# Patient Record
Sex: MALE | Race: WHITE | NOT HISPANIC OR LATINO | Employment: FULL TIME | URBAN - NONMETROPOLITAN AREA
[De-identification: names, ages, dates, MRNs, and addresses within clinical notes are randomized per-mention and may not be internally consistent; named-entity substitution may affect disease eponyms.]

---

## 2017-10-27 ENCOUNTER — APPOINTMENT (OUTPATIENT)
Dept: OCCUPATIONAL MEDICINE | Facility: OTHER | Age: 32
End: 2017-10-27

## 2017-10-27 ENCOUNTER — APPOINTMENT (OUTPATIENT)
Dept: CHIROPRACTIC MEDICINE | Facility: OTHER | Age: 32
End: 2017-10-27

## 2017-10-27 PROCEDURE — 99499 UNLISTED E&M SERVICE: CPT

## 2019-04-08 NOTE — PROGRESS NOTES
"  SUBJECTIVE:                                                    Rhett Moore is a 33 year old male who presents to clinic today for the following health issues:    New Patient/Transfer of Care    Hypothyroidism Follow-up     Since last visit, patient describes the following symptoms: Weight stable, no hair loss, no skin changes, no constipation, no loose stools; no mood concerns    Was previously on Synthroid but stopped medication due to losing health insurance. Has been off since age 23.    Elevated blood pressure - no prior diagnosis of hypertension.  Asymptomatic.  On chest pain, headaches, vision changes.    Morbid obesity - is at max.  Struggle since college.    Is fasting today.    Eyelashes bend down/in.  Has been that way for few years.  Affects vision.  Some crusting.  Safety glasses at work.  Like looking through a screen all the time.    Problem list and histories reviewed & adjusted, as indicated.  Additional history: as documented    No current outpatient medications on file.     No current facility-administered medications for this visit.        Patient Active Problem List   Diagnosis     Morbid obesity (H)     Hypothyroidism, unspecified type     Past Surgical History:   Procedure Laterality Date     APPENDECTOMY       TUBES         Social History     Tobacco Use     Smoking status: Never Smoker     Smokeless tobacco: Never Used   Substance Use Topics     Alcohol use: Yes     Family History   Problem Relation Age of Onset     Cancer Mother      Cerebrovascular Disease Father            ROS:  Constitutional, HEENT, cardiovascular, pulmonary, gi and gu systems are negative, except as otherwise noted.    OBJECTIVE:     /90 (BP Location: Right arm, Patient Position: Chair, Cuff Size: Adult Large)   Pulse 82   Temp 96.7  F (35.9  C) (Tympanic)   Ht 1.772 m (5' 9.75\")   Wt (!) 167.4 kg (369 lb)   SpO2 97%   BMI 53.33 kg/m    Body mass index is 53.33 kg/m .  GENERAL: alert, no distress and " obese  EYES: PERRL, EOMI and eyelids do protrude down from top to bottom, through full visual fields; lids otherwise normal  NECK: no adenopathy, no asymmetry, masses, or scars and thyroid normal to palpation  RESP: lungs clear to auscultation - no rales, rhonchi or wheezes  CV: regular rate and rhythm, normal S1 S2, no S3 or S4, no murmur, click or rub, no peripheral edema and peripheral pulses strong  ABDOMEN: soft, nontender, no hepatosplenomegaly, no masses and bowel sounds normal  MS: no gross musculoskeletal defects noted, no edema  NEURO: symmetric reflexes  SKIN: no suspicious lesions or rashes  PSYCH: mentation appears normal, affect normal/bright    Diagnostic Test Results:  pending    ASSESSMENT/PLAN:     (E03.9) Hypothyroidism, unspecified type  (primary encounter diagnosis)  Comment: off medication for 10 years; stressed importance of compliance for his health  Plan: TSH with free T4 reflex        Will restart medication based on results    (E66.01) Morbid obesity (H)  Comment: may be partially secondary to his untreated hypothyroidism; labs as ordered; referral to nutritionist; consider Metformin; consider bariatric referral - medical vs surgical  Plan: TSH with free T4 reflex, Lipid Profile (Chol,         Trig, HDL, LDL calc), Comprehensive metabolic         panel, NUTRITION REFERRAL            (Z00.00) Encounter for medical examination to establish care    (R03.0) Elevated blood pressure reading without diagnosis of hypertension  Comment: will recheck at follow up  Plan: Comprehensive metabolic panel        If continues to be elevated - will treat; lifestyle modifications and address thyroid first    (H02.9) Eyelid abnormality  Comment: structural abnormality; affects line of vision  Plan: OPHTHALMOLOGY ADULT REFERRAL        [facility]    (Z13.220) Lipid screening  Comment: fasting today  Plan: Lipid Profile (Chol, Trig, HDL, LDL calc)            (Z13.1) Screening for diabetes  mellitus  Comment: fasting today  Plan: Comprehensive metabolic panel              Noelle Shin MD  [facility]

## 2019-04-11 ENCOUNTER — OFFICE VISIT (OUTPATIENT)
Dept: FAMILY MEDICINE | Facility: OTHER | Age: 34
End: 2019-04-11
Attending: FAMILY MEDICINE
Payer: COMMERCIAL

## 2019-04-11 VITALS
BODY MASS INDEX: 45.1 KG/M2 | TEMPERATURE: 96.7 F | OXYGEN SATURATION: 97 % | DIASTOLIC BLOOD PRESSURE: 90 MMHG | SYSTOLIC BLOOD PRESSURE: 132 MMHG | HEIGHT: 70 IN | HEART RATE: 82 BPM | WEIGHT: 315 LBS

## 2019-04-11 DIAGNOSIS — E66.01 MORBID OBESITY (H): ICD-10-CM

## 2019-04-11 DIAGNOSIS — Z00.00 ENCOUNTER FOR MEDICAL EXAMINATION TO ESTABLISH CARE: ICD-10-CM

## 2019-04-11 DIAGNOSIS — R03.0 ELEVATED BLOOD PRESSURE READING WITHOUT DIAGNOSIS OF HYPERTENSION: ICD-10-CM

## 2019-04-11 DIAGNOSIS — Z13.220 LIPID SCREENING: ICD-10-CM

## 2019-04-11 DIAGNOSIS — H02.9 EYELID ABNORMALITY: ICD-10-CM

## 2019-04-11 DIAGNOSIS — Z13.1 SCREENING FOR DIABETES MELLITUS: ICD-10-CM

## 2019-04-11 DIAGNOSIS — E03.9 HYPOTHYROIDISM, UNSPECIFIED TYPE: Primary | ICD-10-CM

## 2019-04-11 LAB
ALBUMIN SERPL-MCNC: 4.2 G/DL (ref 3.4–5)
ALP SERPL-CCNC: 56 U/L (ref 40–150)
ALT SERPL W P-5'-P-CCNC: 92 U/L (ref 0–70)
ANION GAP SERPL CALCULATED.3IONS-SCNC: 6 MMOL/L (ref 3–14)
AST SERPL W P-5'-P-CCNC: 29 U/L (ref 0–45)
BILIRUB SERPL-MCNC: 0.7 MG/DL (ref 0.2–1.3)
BUN SERPL-MCNC: 18 MG/DL (ref 7–30)
CALCIUM SERPL-MCNC: 8.7 MG/DL (ref 8.5–10.1)
CHLORIDE SERPL-SCNC: 107 MMOL/L (ref 94–109)
CHOLEST SERPL-MCNC: 223 MG/DL
CO2 SERPL-SCNC: 27 MMOL/L (ref 20–32)
CREAT SERPL-MCNC: 0.99 MG/DL (ref 0.66–1.25)
GFR SERPL CREATININE-BSD FRML MDRD: >90 ML/MIN/{1.73_M2}
GLUCOSE SERPL-MCNC: 88 MG/DL (ref 70–99)
HDLC SERPL-MCNC: 39 MG/DL
LDLC SERPL CALC-MCNC: 149 MG/DL
NONHDLC SERPL-MCNC: 184 MG/DL
POTASSIUM SERPL-SCNC: 4.1 MMOL/L (ref 3.4–5.3)
PROT SERPL-MCNC: 8 G/DL (ref 6.8–8.8)
SODIUM SERPL-SCNC: 140 MMOL/L (ref 133–144)
T4 FREE SERPL-MCNC: 0.63 NG/DL (ref 0.76–1.46)
TRIGL SERPL-MCNC: 176 MG/DL
TSH SERPL DL<=0.005 MIU/L-ACNC: 38.77 MU/L (ref 0.4–4)

## 2019-04-11 PROCEDURE — 99203 OFFICE O/P NEW LOW 30 MIN: CPT | Performed by: FAMILY MEDICINE

## 2019-04-11 PROCEDURE — 80053 COMPREHEN METABOLIC PANEL: CPT | Performed by: FAMILY MEDICINE

## 2019-04-11 PROCEDURE — 80061 LIPID PANEL: CPT | Performed by: FAMILY MEDICINE

## 2019-04-11 PROCEDURE — 84443 ASSAY THYROID STIM HORMONE: CPT | Performed by: FAMILY MEDICINE

## 2019-04-11 PROCEDURE — 84439 ASSAY OF FREE THYROXINE: CPT | Performed by: FAMILY MEDICINE

## 2019-04-11 PROCEDURE — 36415 COLL VENOUS BLD VENIPUNCTURE: CPT | Performed by: FAMILY MEDICINE

## 2019-04-11 RX ORDER — LEVOTHYROXINE SODIUM 50 UG/1
50 TABLET ORAL DAILY
Qty: 30 TABLET | Refills: 1 | Status: SHIPPED | OUTPATIENT
Start: 2019-04-11 | End: 2019-05-14

## 2019-04-11 ASSESSMENT — PATIENT HEALTH QUESTIONNAIRE - PHQ9: SUM OF ALL RESPONSES TO PHQ QUESTIONS 1-9: 0

## 2019-04-11 ASSESSMENT — ANXIETY QUESTIONNAIRES
GAD7 TOTAL SCORE: 0
5. BEING SO RESTLESS THAT IT IS HARD TO SIT STILL: NOT AT ALL
3. WORRYING TOO MUCH ABOUT DIFFERENT THINGS: NOT AT ALL
7. FEELING AFRAID AS IF SOMETHING AWFUL MIGHT HAPPEN: NOT AT ALL
1. FEELING NERVOUS, ANXIOUS, OR ON EDGE: NOT AT ALL
2. NOT BEING ABLE TO STOP OR CONTROL WORRYING: NOT AT ALL
4. TROUBLE RELAXING: NOT AT ALL
6. BECOMING EASILY ANNOYED OR IRRITABLE: NOT AT ALL

## 2019-04-11 ASSESSMENT — PAIN SCALES - GENERAL: PAINLEVEL: NO PAIN (0)

## 2019-04-11 ASSESSMENT — MIFFLIN-ST. JEOR: SCORE: 2631.05

## 2019-04-11 NOTE — NURSING NOTE
"Chief Complaint   Patient presents with     Establish Care       Initial /90 (BP Location: Right arm, Patient Position: Chair, Cuff Size: Adult Large)   Pulse 82   Temp 96.7  F (35.9  C) (Tympanic)   Ht 1.772 m (5' 9.75\")   Wt (!) 167.4 kg (369 lb)   SpO2 97%   BMI 53.33 kg/m   Estimated body mass index is 53.33 kg/m  as calculated from the following:    Height as of this encounter: 1.772 m (5' 9.75\").    Weight as of this encounter: 167.4 kg (369 lb).  Medication Reconciliation: complete    Zoila Larson LPN    "

## 2019-04-11 NOTE — PATIENT INSTRUCTIONS
Will call with lab results.  Will then restart thyroid medication accordingly.  Referral to nutritionist.  Referral to [facility].

## 2019-04-12 ASSESSMENT — ANXIETY QUESTIONNAIRES: GAD7 TOTAL SCORE: 0

## 2019-04-16 ENCOUNTER — HOSPITAL ENCOUNTER (OUTPATIENT)
Dept: NUTRITION | Facility: HOSPITAL | Age: 34
Discharge: HOME | End: 2019-04-16
Attending: FAMILY MEDICINE | Admitting: FAMILY MEDICINE
Payer: COMMERCIAL

## 2019-04-16 PROCEDURE — 97802 MEDICAL NUTRITION INDIV IN: CPT | Performed by: DIETITIAN, REGISTERED

## 2019-04-17 VITALS — HEIGHT: 70 IN | BODY MASS INDEX: 45.1 KG/M2 | WEIGHT: 315 LBS

## 2019-04-17 ASSESSMENT — MIFFLIN-ST. JEOR: SCORE: 2654.19

## 2019-04-17 NOTE — PROGRESS NOTES
"[facility]  Medical Nutrition Therapy    Visit Type: Initial Assessment    Rhett Moore referred for MNT related to Obesity      Nutrition Assessment:  Anthropometrics  Height: .  177.2 cm (5' 9.75\") BMI:    54.06   Weight:  (!) 169.7 kg (374 lb 1.6 oz)(with work boots on) BSA:  2.89   IBW (kg): Male: 73.4         Nutrition History  Pt his here for weight management. Pt has not tried to lose weight in the past. He use to live a more active lifestyle before starting current job. Typically eats 2-3 times a day. No snacks. Some portions are large. Little to no fruits and vegetables incorporated into his daily food choices. Eats out 2-3x per week. Sedentary job. Is motivated to start making changes.     Food Record  Breakfast  - weekdays- none usually  - weekend- 3 eggs with 3-4 goode or sausage, 2 toast with tsp of butter each    Lunch  - weekday- sandwich- ham and cheese with a granola bar and grapes    Dinner  - pizza (from [employer])- 4 pieces   - tacos- 2 large soft shell with meat, cheese, lettuce  - porketta  - 2 sandwiches with potatoes  - spaghetti- 2-3 cups with texas toast- 2 pieces    Snack  - none    Beverages  - water  - skim milk  - Captain and [employer] (10-15 drinks every other weekend)    Physical Activity   Not much. Sedentary job- driving. Use to be more active before starting this job. Use to ride bike with his cousin. 4-wheeling and hunting.    Nutrition Prescription  Energy:   6221-0490 kcal (MSJ -500 kcal)    Protein:   55-70g (0.8-1.0g/kg IBW)           Nutrition Diagnosis:   Obesity related to excess energy intake as evidenced by BMI 54    Nutrition Intervention:   - MyPlate  - Portion size  - Weight loss recommendations- limit calories from beverages (including alcohol)  - Physical activity- getting heart rate up  - Food Journal  - SMART Goals    Nutrition Goals:   1. Reduce portion size at dinner during the week (5 days)  2. Increase physical activity 1 hour 3 days a " week    Nutrition Follow Up / Monitoring:   diet recall, weight, physical activity    Nutrition Recommendations:   2000 kcal diet with a goal of increasing daily acitivty    Time spent with pt: 60 min    Patient to follow-up with RD in 4-8 weeks.  Patient has RD contact information to call/email if needed.

## 2019-04-22 ENCOUNTER — TRANSFERRED RECORDS (OUTPATIENT)
Dept: HEALTH INFORMATION MANAGEMENT | Facility: CLINIC | Age: 34
End: 2019-04-22

## 2019-05-14 NOTE — PROGRESS NOTES
SUBJECTIVE:   Rhett Moore is a 33 year old male who presents to clinic today for the following   health issues:    Hypothyroidism Follow-up      Since last visit, patient describes the following symptoms: Weight stable, no hair loss, no skin changes, no constipation, no loose stools    Amount of exercise or physical activity: 2-3 days/week for an average of 15-30 minutes    Problems taking medications regularly: No    Medication side effects: none    Diet: regular (no restrictions)    (Elevated BP Follow up) - has never been on medication.  Asymptomatic.    Hyperlipidemia Follow-Up      Rate your low fat/cholesterol diet?: fair    Taking statin?  No    Other lipid medications/supplements?:  None    Started to walk regularly since last visit    Started lifting weights as well    Cutting back on portions    Has met with dietician    Cholesterol   Date Value Ref Range Status   04/11/2019 223 (H) <200 mg/dL Final     Comment:     Desirable:       <200 mg/dl     HDL Cholesterol   Date Value Ref Range Status   04/11/2019 39 (L) >39 mg/dL Final     LDL Cholesterol Calculated   Date Value Ref Range Status   04/11/2019 149 (H) <100 mg/dL Final     Comment:     Above desirable:  100-129 mg/dl  Borderline High:  130-159 mg/dL  High:             160-189 mg/dL  Very high:       >189 mg/dl       Triglycerides   Date Value Ref Range Status   04/11/2019 176 (H) <150 mg/dL Final     Comment:     Borderline high:  150-199 mg/dl  High:             200-499 mg/dl  Very high:       >499 mg/dl         No results found for: CHOLHDLRATIO          Additional history: as documented    Reviewed  and updated as needed this visit by clinical staff         Reviewed and updated as needed this visit by Provider         Current Outpatient Medications   Medication     levothyroxine (SYNTHROID/LEVOTHROID) 50 MCG tablet     lisinopril (PRINIVIL/ZESTRIL) 10 MG tablet     No current facility-administered medications for this visit.        Patient  Active Problem List   Diagnosis     Morbid obesity (H)     Hypothyroidism, unspecified type     Essential hypertension     Past Surgical History:   Procedure Laterality Date     APPENDECTOMY       TUBES         Social History     Tobacco Use     Smoking status: Never Smoker     Smokeless tobacco: Never Used   Substance Use Topics     Alcohol use: Yes     Family History   Problem Relation Age of Onset     Cancer Mother      Cerebrovascular Disease Father            ROS:  CONSTITUTIONAL: NEGATIVE for fever, chills, change in weight  EYES: NEGATIVE for vision changes or irritation  ENT/MOUTH: NEGATIVE for ear, mouth and throat problems  RESP: NEGATIVE for significant cough or SOB  CV: NEGATIVE for chest pain, palpitations or peripheral edema  MUSCULOSKELETAL: NEGATIVE for significant arthralgias or myalgia    OBJECTIVE:                                                    /82 (BP Location: Right arm, Patient Position: Chair, Cuff Size: Adult Large)   Pulse 86   Temp 97.9  F (36.6  C) (Tympanic)   Wt (!) 165.7 kg (365 lb 6.4 oz)   SpO2 97%   BMI 52.81 kg/m    Body mass index is 52.81 kg/m .   GENERAL: obese, alert, no distress  NECK: no tenderness, no adenopathy, no asymmetry, no masses, no stiffness; thyroid- normal to palpation  RESP: lungs clear to auscultation - no rales, no rhonchi, no wheezes  CV: regular rates and rhythm, normal S1 S2, no S3 or S4 and no murmur, no click or rub -  PSYCH: Alert and oriented times 3; speech- coherent , normal rate and volume    TSH pending.     ASSESSMENT/PLAN:                                                    1. Essential hypertension  New diagnosis.  Start Lisinopril 10 mg daily.  Counseled on use, potential side effects.  Recheck in office 1 vinay - BP and lab.  - lisinopril (PRINIVIL/ZESTRIL) 10 MG tablet; Take 1 tablet (10 mg) by mouth daily  Dispense: 30 tablet; Refill: 3    2. Hypothyroidism, unspecified type  Recent start of Synthroid. Due for recheck level.  Then  will adjust Sythroid accordingly.    (E66.01) Morbid obesity (H)  Comment: dietary, exercise, and lifestyle counseling done again  See Patient Instructions    Noelle Shin MD  [facility]

## 2019-05-20 ENCOUNTER — OFFICE VISIT (OUTPATIENT)
Dept: FAMILY MEDICINE | Facility: OTHER | Age: 34
End: 2019-05-20
Attending: FAMILY MEDICINE
Payer: COMMERCIAL

## 2019-05-20 VITALS
BODY MASS INDEX: 52.81 KG/M2 | WEIGHT: 315 LBS | HEART RATE: 86 BPM | SYSTOLIC BLOOD PRESSURE: 140 MMHG | DIASTOLIC BLOOD PRESSURE: 82 MMHG | OXYGEN SATURATION: 97 % | TEMPERATURE: 97.9 F

## 2019-05-20 DIAGNOSIS — I10 ESSENTIAL HYPERTENSION: Primary | ICD-10-CM

## 2019-05-20 DIAGNOSIS — E03.9 HYPOTHYROIDISM, UNSPECIFIED TYPE: ICD-10-CM

## 2019-05-20 DIAGNOSIS — E66.01 MORBID OBESITY (H): ICD-10-CM

## 2019-05-20 LAB
T4 FREE SERPL-MCNC: 0.68 NG/DL (ref 0.76–1.46)
TSH SERPL DL<=0.005 MIU/L-ACNC: 16.93 MU/L (ref 0.4–4)

## 2019-05-20 PROCEDURE — 36415 COLL VENOUS BLD VENIPUNCTURE: CPT | Performed by: FAMILY MEDICINE

## 2019-05-20 PROCEDURE — 84439 ASSAY OF FREE THYROXINE: CPT | Performed by: FAMILY MEDICINE

## 2019-05-20 PROCEDURE — 84443 ASSAY THYROID STIM HORMONE: CPT | Performed by: FAMILY MEDICINE

## 2019-05-20 PROCEDURE — 99214 OFFICE O/P EST MOD 30 MIN: CPT | Performed by: FAMILY MEDICINE

## 2019-05-20 RX ORDER — LEVOTHYROXINE SODIUM 75 UG/1
75 TABLET ORAL DAILY
Qty: 30 TABLET | Refills: 1 | Status: SHIPPED | OUTPATIENT
Start: 2019-05-20 | End: 2019-06-20

## 2019-05-20 RX ORDER — LISINOPRIL 10 MG/1
10 TABLET ORAL DAILY
Qty: 30 TABLET | Refills: 3 | Status: SHIPPED | OUTPATIENT
Start: 2019-05-20 | End: 2019-08-19

## 2019-05-20 ASSESSMENT — PAIN SCALES - GENERAL: PAINLEVEL: NO PAIN (0)

## 2019-05-20 NOTE — PATIENT INSTRUCTIONS
Recheck thyroid lab today - adjust synthroid accordingly.    Start Lisinopril 10 mg daily for BP.    Follow up 1 month to recheck BP/Lab.

## 2019-05-20 NOTE — NURSING NOTE
"Chief Complaint   Patient presents with     Hypertension     Thyroid Problem     Lipids       Initial /86 (BP Location: Right arm, Patient Position: Chair, Cuff Size: Adult Large)   Pulse 86   Temp 97.9  F (36.6  C) (Tympanic)   Wt (!) 165.7 kg (365 lb 6.4 oz)   SpO2 97%   BMI 52.81 kg/m   Estimated body mass index is 52.81 kg/m  as calculated from the following:    Height as of 4/17/19: 1.772 m (5' 9.75\").    Weight as of this encounter: 165.7 kg (365 lb 6.4 oz).  Medication Reconciliation: complete    Zoila Larson LPN    "

## 2019-06-18 NOTE — PROGRESS NOTES
Subjective     Rhett Moore is a 33 year old male who presents to clinic today for the following health issues:    HPI   Hypertension Follow-up      Do you check your blood pressure regularly outside of the clinic? No     Are you following a low salt diet? No    Are your blood pressures ever more than 140 on the top number (systolic) OR more   than 90 on the bottom number (diastolic), for example 140/90? Unsure  Started on Lisinopril 10 mg daily 1 month ago.  Patient states no issues / side effects with medication.   Is taking regularly.     Hypothyroidism Follow-up      Since last visit, patient describes the following symptoms: Weight stable, no hair loss, no skin changes, no constipation, no loose stools    Amount of exercise or physical activity: 2 days per week    Problems taking medications regularly: No    Medication side effects: none    Diet: regular (no restrictions)    Synthroid dose increased 1 month ago    Patient states no issues / side effects with medication.     Is taking regularly.           Current Outpatient Medications   Medication     levothyroxine (SYNTHROID/LEVOTHROID) 75 MCG tablet     lisinopril (PRINIVIL/ZESTRIL) 10 MG tablet     No current facility-administered medications for this visit.        Patient Active Problem List   Diagnosis     Morbid obesity (H)     Hypothyroidism, unspecified type     Essential hypertension     Past Surgical History:   Procedure Laterality Date     APPENDECTOMY       TUBES         Social History     Tobacco Use     Smoking status: Never Smoker     Smokeless tobacco: Never Used   Substance Use Topics     Alcohol use: Yes     Family History   Problem Relation Age of Onset     Cancer Mother      Cerebrovascular Disease Father              Reviewed and updated as needed this visit by Provider  Allergies  Meds         Review of Systems   ROS COMP: Constitutional, HEENT, cardiovascular, pulmonary, gi and gu systems are negative, except as otherwise noted.     "  Objective    /82 (BP Location: Right arm, Patient Position: Chair, Cuff Size: Adult Large)   Pulse 78   Temp 98.2  F (36.8  C) (Tympanic)   Wt (!) 166.5 kg (367 lb)   SpO2 97%   BMI 53.04 kg/m    Body mass index is 53.04 kg/m .  Physical Exam   GENERAL: alert, no distress and obese  EYES: Eyes grossly normal to inspection, PERRL and conjunctivae and sclerae normal  NECK: no adenopathy, no asymmetry, masses, or scars and thyroid normal to palpation  RESP: lungs clear to auscultation - no rales, rhonchi or wheezes  CV: regular rate and rhythm, normal S1 S2, no S3 or S4, no murmur, click or rub, no peripheral edema and peripheral pulses strong  MS: no gross musculoskeletal defects noted, no edema  SKIN: no suspicious lesions or rashes  PSYCH: mentation appears normal, affect normal/bright    Diagnostic Test Results:  Pending tsh and bmp        Assessment & Plan       ICD-10-CM    1. Essential hypertension I10 Basic metabolic panel   2. Hypothyroidism, unspecified type E03.9 TSH with free T4 reflex     levothyroxine (SYNTHROID/LEVOTHROID) 75 MCG tablet   3. Morbid obesity (H) E66.01         BMI:   Estimated body mass index is 53.04 kg/m  as calculated from the following:    Height as of 4/17/19: 1.772 m (5' 9.75\").    Weight as of this encounter: 166.5 kg (367 lb).   Weight management plan: Discussed healthy diet and exercise guidelines    Overall doing well.  No side effects with medications.  Encouraged lifestyle changes again as well.  Labs today and will adjust accordingly.    Patient Instructions   Continue current dose.  Will call with lab results.      Return in about 3 months (around 9/20/2019) for BP Recheck.    Noelle Shin MD  [facility]      "

## 2019-06-20 ENCOUNTER — OFFICE VISIT (OUTPATIENT)
Dept: FAMILY MEDICINE | Facility: OTHER | Age: 34
End: 2019-06-20
Attending: FAMILY MEDICINE
Payer: COMMERCIAL

## 2019-06-20 VITALS
WEIGHT: 315 LBS | OXYGEN SATURATION: 97 % | SYSTOLIC BLOOD PRESSURE: 126 MMHG | HEART RATE: 78 BPM | DIASTOLIC BLOOD PRESSURE: 82 MMHG | BODY MASS INDEX: 53.04 KG/M2 | TEMPERATURE: 98.2 F

## 2019-06-20 DIAGNOSIS — E03.9 HYPOTHYROIDISM, UNSPECIFIED TYPE: ICD-10-CM

## 2019-06-20 DIAGNOSIS — E66.01 MORBID OBESITY (H): ICD-10-CM

## 2019-06-20 DIAGNOSIS — I10 ESSENTIAL HYPERTENSION: Primary | ICD-10-CM

## 2019-06-20 LAB
ANION GAP SERPL CALCULATED.3IONS-SCNC: 6 MMOL/L (ref 3–14)
BUN SERPL-MCNC: 13 MG/DL (ref 7–30)
CALCIUM SERPL-MCNC: 9.1 MG/DL (ref 8.5–10.1)
CHLORIDE SERPL-SCNC: 107 MMOL/L (ref 94–109)
CO2 SERPL-SCNC: 27 MMOL/L (ref 20–32)
CREAT SERPL-MCNC: 1.03 MG/DL (ref 0.66–1.25)
GFR SERPL CREATININE-BSD FRML MDRD: >90 ML/MIN/{1.73_M2}
GLUCOSE SERPL-MCNC: 86 MG/DL (ref 70–99)
POTASSIUM SERPL-SCNC: 4.5 MMOL/L (ref 3.4–5.3)
SODIUM SERPL-SCNC: 140 MMOL/L (ref 133–144)
T4 FREE SERPL-MCNC: 0.79 NG/DL (ref 0.76–1.46)
TSH SERPL DL<=0.005 MIU/L-ACNC: 18.45 MU/L (ref 0.4–4)

## 2019-06-20 PROCEDURE — 80048 BASIC METABOLIC PNL TOTAL CA: CPT | Performed by: FAMILY MEDICINE

## 2019-06-20 PROCEDURE — 99213 OFFICE O/P EST LOW 20 MIN: CPT | Performed by: FAMILY MEDICINE

## 2019-06-20 PROCEDURE — 84443 ASSAY THYROID STIM HORMONE: CPT | Performed by: FAMILY MEDICINE

## 2019-06-20 PROCEDURE — 36415 COLL VENOUS BLD VENIPUNCTURE: CPT | Performed by: FAMILY MEDICINE

## 2019-06-20 PROCEDURE — 84439 ASSAY OF FREE THYROXINE: CPT | Performed by: FAMILY MEDICINE

## 2019-06-20 RX ORDER — LEVOTHYROXINE SODIUM 75 UG/1
75 TABLET ORAL DAILY
Qty: 90 TABLET | Refills: 1 | Status: SHIPPED | OUTPATIENT
Start: 2019-06-20 | End: 2019-06-20

## 2019-06-20 RX ORDER — LEVOTHYROXINE SODIUM 88 UG/1
88 TABLET ORAL DAILY
Qty: 30 TABLET | Refills: 1 | Status: SHIPPED | OUTPATIENT
Start: 2019-06-20 | End: 2019-08-19

## 2019-06-20 ASSESSMENT — PAIN SCALES - GENERAL: PAINLEVEL: NO PAIN (0)

## 2019-06-20 NOTE — NURSING NOTE
"Chief Complaint   Patient presents with     Hypertension     Thyroid Problem       Initial /82 (BP Location: Right arm, Patient Position: Chair, Cuff Size: Adult Large)   Pulse 78   Temp 98.2  F (36.8  C) (Tympanic)   Wt (!) 166.5 kg (367 lb)   SpO2 97%   BMI 53.04 kg/m   Estimated body mass index is 53.04 kg/m  as calculated from the following:    Height as of 4/17/19: 1.772 m (5' 9.75\").    Weight as of this encounter: 166.5 kg (367 lb).  Medication Reconciliation: complete   Zoila Larson LPN          "

## 2019-08-19 DIAGNOSIS — E03.9 HYPOTHYROIDISM, UNSPECIFIED TYPE: ICD-10-CM

## 2019-08-19 DIAGNOSIS — I10 ESSENTIAL HYPERTENSION: ICD-10-CM

## 2019-08-19 NOTE — TELEPHONE ENCOUNTER
Patient states he received a letter from his insurance requesting he change pharmacies to [facility] in [place], [place] along with changing his fills to 90 tabs per refill

## 2019-08-22 RX ORDER — LISINOPRIL 10 MG/1
10 TABLET ORAL DAILY
Qty: 90 TABLET | Refills: 2 | Status: SHIPPED | OUTPATIENT
Start: 2019-08-22 | End: 2020-08-18

## 2019-08-22 RX ORDER — LEVOTHYROXINE SODIUM 88 UG/1
88 TABLET ORAL DAILY
Qty: 90 TABLET | Refills: 0 | Status: SHIPPED | OUTPATIENT
Start: 2019-08-22 | End: 2019-09-09

## 2019-09-09 DIAGNOSIS — E03.9 HYPOTHYROIDISM, UNSPECIFIED TYPE: ICD-10-CM

## 2019-09-09 LAB
T4 FREE SERPL-MCNC: 0.7 NG/DL (ref 0.76–1.46)
TSH SERPL DL<=0.005 MIU/L-ACNC: 23.78 MU/L (ref 0.4–4)

## 2019-09-09 PROCEDURE — 84439 ASSAY OF FREE THYROXINE: CPT | Performed by: FAMILY MEDICINE

## 2019-09-09 PROCEDURE — 36415 COLL VENOUS BLD VENIPUNCTURE: CPT | Performed by: FAMILY MEDICINE

## 2019-09-09 PROCEDURE — 84443 ASSAY THYROID STIM HORMONE: CPT | Performed by: FAMILY MEDICINE

## 2019-09-09 RX ORDER — LEVOTHYROXINE SODIUM 100 UG/1
100 TABLET ORAL DAILY
Qty: 30 TABLET | Refills: 1 | Status: SHIPPED | OUTPATIENT
Start: 2019-09-09 | End: 2019-10-31

## 2019-11-12 DIAGNOSIS — E03.9 HYPOTHYROIDISM, UNSPECIFIED TYPE: ICD-10-CM

## 2019-11-12 LAB
T4 FREE SERPL-MCNC: 0.83 NG/DL (ref 0.76–1.46)
TSH SERPL DL<=0.005 MIU/L-ACNC: 14.73 MU/L (ref 0.4–4)

## 2019-11-12 PROCEDURE — 36415 COLL VENOUS BLD VENIPUNCTURE: CPT | Performed by: FAMILY MEDICINE

## 2019-11-12 PROCEDURE — 84439 ASSAY OF FREE THYROXINE: CPT | Performed by: FAMILY MEDICINE

## 2019-11-12 PROCEDURE — 84443 ASSAY THYROID STIM HORMONE: CPT | Performed by: FAMILY MEDICINE

## 2019-11-13 RX ORDER — LEVOTHYROXINE SODIUM 112 UG/1
112 TABLET ORAL DAILY
Qty: 30 TABLET | Refills: 1 | Status: SHIPPED | OUTPATIENT
Start: 2019-11-13 | End: 2019-12-07

## 2019-11-16 DIAGNOSIS — E03.9 HYPOTHYROIDISM, UNSPECIFIED TYPE: ICD-10-CM

## 2019-11-18 RX ORDER — LEVOTHYROXINE SODIUM 88 UG/1
TABLET ORAL
Qty: 90 TABLET | Refills: 0 | OUTPATIENT
Start: 2019-11-18

## 2019-12-07 DIAGNOSIS — E03.9 HYPOTHYROIDISM, UNSPECIFIED TYPE: ICD-10-CM

## 2019-12-10 RX ORDER — LEVOTHYROXINE SODIUM 112 UG/1
TABLET ORAL
Qty: 30 TABLET | Refills: 0 | Status: SHIPPED | OUTPATIENT
Start: 2019-12-10 | End: 2019-12-13

## 2019-12-13 NOTE — TELEPHONE ENCOUNTER
Fax received from [facility] pharmacy, the pt's insurance will only cover a 90 day supply of medication. Please change the Rx to a 90 day supply.

## 2019-12-16 RX ORDER — LEVOTHYROXINE SODIUM 112 UG/1
112 TABLET ORAL DAILY
Qty: 90 TABLET | Refills: 0 | Status: SHIPPED | OUTPATIENT
Start: 2019-12-16 | End: 2020-03-06

## 2020-02-08 ENCOUNTER — HOSPITAL ENCOUNTER (EMERGENCY)
Facility: HOSPITAL | Age: 35
Discharge: HOME | End: 2020-02-08
Attending: NURSE PRACTITIONER | Admitting: NURSE PRACTITIONER
Payer: COMMERCIAL

## 2020-02-08 ENCOUNTER — APPOINTMENT (OUTPATIENT)
Dept: GENERAL RADIOLOGY | Facility: HOSPITAL | Age: 35
End: 2020-02-08
Attending: NURSE PRACTITIONER
Payer: COMMERCIAL

## 2020-02-08 VITALS
DIASTOLIC BLOOD PRESSURE: 100 MMHG | SYSTOLIC BLOOD PRESSURE: 158 MMHG | RESPIRATION RATE: 24 BRPM | TEMPERATURE: 99.5 F | HEART RATE: 115 BPM | OXYGEN SATURATION: 95 %

## 2020-02-08 DIAGNOSIS — J18.9 PNEUMONIA: ICD-10-CM

## 2020-02-08 DIAGNOSIS — J18.9 PNEUMONIA DUE TO INFECTIOUS ORGANISM, UNSPECIFIED LATERALITY, UNSPECIFIED PART OF LUNG: ICD-10-CM

## 2020-02-08 LAB
ANION GAP SERPL CALCULATED.3IONS-SCNC: 8 MMOL/L (ref 3–14)
BASOPHILS # BLD AUTO: 0.1 10E9/L (ref 0–0.2)
BASOPHILS NFR BLD AUTO: 0.7 %
BUN SERPL-MCNC: 13 MG/DL (ref 7–30)
CALCIUM SERPL-MCNC: 9.1 MG/DL (ref 8.5–10.1)
CHLORIDE SERPL-SCNC: 102 MMOL/L (ref 94–109)
CO2 SERPL-SCNC: 24 MMOL/L (ref 20–32)
CREAT SERPL-MCNC: 1.06 MG/DL (ref 0.66–1.25)
CRP SERPL-MCNC: 222 MG/L (ref 0–8)
DEPRECATED S PYO AG THROAT QL EIA: NORMAL
DIFFERENTIAL METHOD BLD: ABNORMAL
EOSINOPHIL # BLD AUTO: 0.2 10E9/L (ref 0–0.7)
EOSINOPHIL NFR BLD AUTO: 1.1 %
ERYTHROCYTE [DISTWIDTH] IN BLOOD BY AUTOMATED COUNT: 13 % (ref 10–15)
GFR SERPL CREATININE-BSD FRML MDRD: >90 ML/MIN/{1.73_M2}
GLUCOSE SERPL-MCNC: 99 MG/DL (ref 70–99)
HCT VFR BLD AUTO: 42.1 % (ref 40–53)
HGB BLD-MCNC: 14 G/DL (ref 13.3–17.7)
IMM GRANULOCYTES # BLD: 0.2 10E9/L (ref 0–0.4)
IMM GRANULOCYTES NFR BLD: 1.4 %
LACTATE BLD-SCNC: 1.1 MMOL/L (ref 0.7–2)
LYMPHOCYTES # BLD AUTO: 1.9 10E9/L (ref 0.8–5.3)
LYMPHOCYTES NFR BLD AUTO: 13.5 %
MCH RBC QN AUTO: 28.7 PG (ref 26.5–33)
MCHC RBC AUTO-ENTMCNC: 33.3 G/DL (ref 31.5–36.5)
MCV RBC AUTO: 86 FL (ref 78–100)
MONOCYTES # BLD AUTO: 1.4 10E9/L (ref 0–1.3)
MONOCYTES NFR BLD AUTO: 10.4 %
NEUTROPHILS # BLD AUTO: 10 10E9/L (ref 1.6–8.3)
NEUTROPHILS NFR BLD AUTO: 72.9 %
NRBC # BLD AUTO: 0 10*3/UL
NRBC BLD AUTO-RTO: 0 /100
PLATELET # BLD AUTO: 451 10E9/L (ref 150–450)
POTASSIUM SERPL-SCNC: 3.7 MMOL/L (ref 3.4–5.3)
RBC # BLD AUTO: 4.88 10E12/L (ref 4.4–5.9)
SODIUM SERPL-SCNC: 134 MMOL/L (ref 133–144)
SPECIMEN SOURCE: NORMAL
WBC # BLD AUTO: 13.7 10E9/L (ref 4–11)

## 2020-02-08 PROCEDURE — 36415 COLL VENOUS BLD VENIPUNCTURE: CPT | Performed by: NURSE PRACTITIONER

## 2020-02-08 PROCEDURE — 71046 X-RAY EXAM CHEST 2 VIEWS: CPT | Mod: TC

## 2020-02-08 PROCEDURE — 86140 C-REACTIVE PROTEIN: CPT | Performed by: NURSE PRACTITIONER

## 2020-02-08 PROCEDURE — 87880 STREP A ASSAY W/OPTIC: CPT | Performed by: NURSE PRACTITIONER

## 2020-02-08 PROCEDURE — 96372 THER/PROPH/DIAG INJ SC/IM: CPT

## 2020-02-08 PROCEDURE — G0463 HOSPITAL OUTPT CLINIC VISIT: HCPCS | Mod: 25

## 2020-02-08 PROCEDURE — 99213 OFFICE O/P EST LOW 20 MIN: CPT | Mod: Z6 | Performed by: NURSE PRACTITIONER

## 2020-02-08 PROCEDURE — 25000128 H RX IP 250 OP 636: Performed by: NURSE PRACTITIONER

## 2020-02-08 PROCEDURE — 80048 BASIC METABOLIC PNL TOTAL CA: CPT | Performed by: NURSE PRACTITIONER

## 2020-02-08 PROCEDURE — 83605 ASSAY OF LACTIC ACID: CPT | Performed by: NURSE PRACTITIONER

## 2020-02-08 PROCEDURE — 87081 CULTURE SCREEN ONLY: CPT | Performed by: NURSE PRACTITIONER

## 2020-02-08 PROCEDURE — 85025 COMPLETE CBC W/AUTO DIFF WBC: CPT | Performed by: NURSE PRACTITIONER

## 2020-02-08 RX ORDER — CEFTRIAXONE SODIUM 1 G
1 VIAL (EA) INJECTION ONCE
Status: COMPLETED | OUTPATIENT
Start: 2020-02-08 | End: 2020-02-08

## 2020-02-08 RX ORDER — AZITHROMYCIN 500 MG/1
500 TABLET, FILM COATED ORAL DAILY
Qty: 5 TABLET | Refills: 0 | Status: SHIPPED | OUTPATIENT
Start: 2020-02-08 | End: 2020-02-12

## 2020-02-08 RX ADMIN — CEFTRIAXONE 1 G: 1 INJECTION, POWDER, FOR SOLUTION INTRAMUSCULAR; INTRAVENOUS at 17:03

## 2020-02-08 ASSESSMENT — ENCOUNTER SYMPTOMS
SINUS PRESSURE: 0
COUGH: 1
MYALGIAS: 1
DIARRHEA: 1
RHINORRHEA: 1
NAUSEA: 0
SHORTNESS OF BREATH: 0
EYES NEGATIVE: 1
SINUS PAIN: 0
CHILLS: 1
ACTIVITY CHANGE: 1
CHEST TIGHTNESS: 0
FEVER: 1
LIGHT-HEADEDNESS: 1
HEADACHES: 1
VOMITING: 0
SORE THROAT: 1
DIZZINESS: 1

## 2020-02-08 ASSESSMENT — CURB65 SCORE
HAS CONFUSION: NO
RESPIRATORY RATE GREATER THAN OR EQUAL TO 30: NO
AGE IS GREATER THAN OR EQUAL TO 65: NO
BUN IS GREATER THAN 19 MG/DL: NO
SBP LESS THAN 90 OR DBNP LESS THAN 60: NO
CURB65 SCORE: 0

## 2020-02-08 NOTE — ED PROVIDER NOTES
History     Chief Complaint   Patient presents with     Influenza     over the past week. cough and fever      HPI  Rhett Moore is a 34 year old male who is accompanied per his mother. He presents with a three week history of chills, fevers, cough, runny nose, sore throat (resolved), diarrhea, body aches, dizziness, headaches, and light headedness. He has a history of otitis media, as an adult, and hypertension. He has taken Mucinex and Dayquil for his symptoms with his last dose at 0600 this morning. His is a non-smoker. He did not receive and influenza vaccine this season. Denies  nausea, vomiting,  and shortness of breath.    Allergies:  No Known Allergies    Problem List:    Patient Active Problem List    Diagnosis Date Noted     Essential hypertension 05/20/2019     Priority: Medium     Morbid obesity (H) 04/11/2019     Priority: Medium     Hypothyroidism, unspecified type 04/11/2019     Priority: Medium        Past Medical History:    Past Medical History:   Diagnosis Date     Attention deficit disorder of childhood without me 02/16/2000     Chronic conjunctivitis of both eyes, unspecified chronic conjunctivitis type      Hypertrichosis of left upper eyelid      Hypertrichosis of right upper eyelid      Unspecified acquired hypothyroidism 10/19/2006       Past Surgical History:    Past Surgical History:   Procedure Laterality Date     APPENDECTOMY       TUBES         Family History:    Family History   Problem Relation Age of Onset     Cancer Mother      Cerebrovascular Disease Father        Social History:  Marital Status:  Single [1]  Social History     Tobacco Use     Smoking status: Never Smoker     Smokeless tobacco: Never Used   Substance Use Topics     Alcohol use: Yes     Drug use: Never        Medications:    azithromycin (ZITHROMAX) 500 MG tablet  levothyroxine (SYNTHROID/LEVOTHROID) 112 MCG tablet  lisinopril (PRINIVIL/ZESTRIL) 10 MG tablet          Review of Systems   Constitutional: Positive  for activity change, appetite change (poor appetite is drinking fluids), chills and fever.   HENT: Positive for rhinorrhea and sore throat (resolved at this time). Negative for ear pain, sinus pressure and sinus pain.    Eyes: Negative.    Respiratory: Positive for cough. Negative for chest tightness and shortness of breath.    Gastrointestinal: Positive for diarrhea. Negative for nausea and vomiting.   Genitourinary: Negative.    Musculoskeletal: Positive for myalgias.   Skin: Negative.    Neurological: Positive for dizziness, light-headedness and headaches.       Physical Exam   BP: 156/93  Pulse: 115  Temp: 99.5  F (37.5  C)  Resp: 24  SpO2: 95 %      Physical Exam  Vitals signs and nursing note reviewed.   Constitutional:       General: He is in acute distress.      Appearance: He is obese. He is ill-appearing.      Comments: He is sweating profusely   HENT:      Head: Normocephalic.      Right Ear: Tympanic membrane and ear canal normal.      Left Ear: Tympanic membrane and ear canal normal.      Nose: Nose normal.      Right Sinus: No maxillary sinus tenderness or frontal sinus tenderness.      Left Sinus: No maxillary sinus tenderness or frontal sinus tenderness.      Mouth/Throat:      Lips: Pink.      Mouth: Mucous membranes are moist.      Pharynx: Oropharynx is clear. Uvula midline.   Eyes:      Conjunctiva/sclera: Conjunctivae normal.   Cardiovascular:      Rate and Rhythm: Regular rhythm. Tachycardia present.      Heart sounds: Normal heart sounds. No murmur.   Pulmonary:      Effort: Pulmonary effort is normal.      Breath sounds: Normal breath sounds. No wheezing or rhonchi.   Lymphadenopathy:      Cervical: No cervical adenopathy.   Skin:     General: Skin is warm and dry.   Neurological:      Mental Status: He is alert and oriented to person, place, and time.   Psychiatric:         Behavior: Behavior normal.         ED Course        Procedures             Results for orders placed or performed  during the hospital encounter of 02/08/20 (from the past 24 hour(s))   Rapid strep screen   Result Value Ref Range    Specimen Description Throat     Rapid Strep A Screen       NEGATIVE: No Group A streptococcal antigen detected by immunoassay, await culture report.   CBC with platelets differential   Result Value Ref Range    WBC 13.7 (H) 4.0 - 11.0 10e9/L    RBC Count 4.88 4.4 - 5.9 10e12/L    Hemoglobin 14.0 13.3 - 17.7 g/dL    Hematocrit 42.1 40.0 - 53.0 %    MCV 86 78 - 100 fl    MCH 28.7 26.5 - 33.0 pg    MCHC 33.3 31.5 - 36.5 g/dL    RDW 13.0 10.0 - 15.0 %    Platelet Count 451 (H) 150 - 450 10e9/L    Diff Method Automated Method     % Neutrophils 72.9 %    % Lymphocytes 13.5 %    % Monocytes 10.4 %    % Eosinophils 1.1 %    % Basophils 0.7 %    % Immature Granulocytes 1.4 %    Nucleated RBCs 0 0 /100    Absolute Neutrophil 10.0 (H) 1.6 - 8.3 10e9/L    Absolute Lymphocytes 1.9 0.8 - 5.3 10e9/L    Absolute Monocytes 1.4 (H) 0.0 - 1.3 10e9/L    Absolute Eosinophils 0.2 0.0 - 0.7 10e9/L    Absolute Basophils 0.1 0.0 - 0.2 10e9/L    Abs Immature Granulocytes 0.2 0 - 0.4 10e9/L    Absolute Nucleated RBC 0.0    CRP inflammation   Result Value Ref Range    CRP Inflammation 222.0 (H) 0.0 - 8.0 mg/L   Basic metabolic panel   Result Value Ref Range    Sodium 134 133 - 144 mmol/L    Potassium 3.7 3.4 - 5.3 mmol/L    Chloride 102 94 - 109 mmol/L    Carbon Dioxide 24 20 - 32 mmol/L    Anion Gap 8 3 - 14 mmol/L    Glucose 99 70 - 99 mg/dL    Urea Nitrogen 13 7 - 30 mg/dL    Creatinine 1.06 0.66 - 1.25 mg/dL    GFR Estimate >90 >60 mL/min/[1.73_m2]    GFR Estimate If Black >90 >60 mL/min/[1.73_m2]    Calcium 9.1 8.5 - 10.1 mg/dL   Lactic acid whole blood   Result Value Ref Range    Lactic Acid 1.1 0.7 - 2.0 mmol/L   Chest XR,  PA & LAT    Narrative    PROCEDURE:  XR CHEST 2 VW    HISTORY:  sick for three weeks with URI symptoms. right lower lung  field rhonchi and decreased. diaphorectic..     COMPARISON:   None.    FINDINGS:   The cardiac silhouette is normal in size. The pulmonary vasculature is  normal.  There is a right lower lobe infiltrate suspicious for  pneumonia No pleural effusion or pneumothorax.      Impression    IMPRESSION:  Right lower lobe infiltrate suspicious for pneumonia      GEO MCKEON MD       Medications   cefTRIAXone (ROCEPHIN) injection 1 g (1 g Intramuscular Given 2/8/20 1703)       Assessments & Plan (with Medical Decision Making)     I have reviewed the nursing notes.    I have reviewed the findings, diagnosis, plan and need for follow up with the patient.  (J18.9) Pneumonia    Comment: 34 year old male who is accompanied per his mother. He presents with a three week history of chills, fevers, cough, runny nose, sore throat (resolved), diarrhea, body aches, dizziness, headaches, and light headedness. He has a history of otitis media, as an adult, and hypertension. He has taken Mucinex and Dayquil for his symptoms with his last dose at 0600 this morning. His is a non-smoker. He did not receive and influenza vaccine this season. Denies  nausea, vomiting,  and shortness of breath.    Assessment negative except that he was sweating profusely. His lungs were clear an he denies shortness of breath. His symptoms were ongoing for three weeks. His blood pressure was elevated, but he had not been taking his blood pressure medications while he was ill. He had been utilizing Dayquil and Nyquil frequently while he was ill.     CXR reviewed and per radiology: Right lower lobe infiltrate suspicious for pneumonia    Rapid strep negative, culture pending. BMP negative. His CRP was 222. WBC 13.7, absolute neutrophil 10 and monocytes 1.4. lactic acid 1.1 His SAMANTHA-2 score was 0.    Plan: he was given an IM dose of ceftriaxone and started on Azithromycin 500 mg once daily for five days.Education provided on these medication and pneumonia. He was instructed to take his BP medications during his time of  illness and avoid medications with ephedrine and pseudoephedrine as these will increase your BP.  He was advised to look at the main ingredients when purchasing OTC medications.      Treat symptoms conservatively with acetaminophen and  ibuprofen (if applicable) for fevers, body aches, and headaches, guaifenesin and/or honey for cough. May use chest rubs for sore throat and congestion, hot and cold liquids may help decrease sore throat and help you feel better. Increase fluids.  Return to be reevaluated by ER/UC or your primary care provider if symptoms worsen, you develop breathing difficulties, or you do not improve in a reasonable time frame. It can take several days for a cough to resolve. It can take ten to fourteen days for upper respiratory symptoms to resolve.   These discharge instructions and medications were reviewed with him and his mother and understanding verbalized.    Discharge Medication List as of 2/8/2020  5:53 PM      START taking these medications    Details   azithromycin (ZITHROMAX) 500 MG tablet Take 1 tablet (500 mg) by mouth daily for 5 days, Disp-5 tablet, R-0, E-Prescribe             Final diagnoses:   Pneumonia       2/8/2020   [facility]       Deisi Velasco, CNP  02/12/20 3040

## 2020-02-08 NOTE — DISCHARGE INSTRUCTIONS
Increase oral intake, cool mist vaporizer as needed, rest, avoid sharing utensils, practice good hand washing techniques, cover mouth when you cough and sneeze. Throw toothbursh away 24 hours after starting antibiotics.  Over the counter medications such as ibuprofen and/or acetaminophen for fever and generalized aches and pains. Ibuprofen 400 to 800 mg (2 - 4 tabs of over the counter med) every six to eight hours as needed;not to exceed maximum amount of 3200 mg in 24 hours.Tylenol 650 to 1000 mg every four to six hours as needed (not to exceed more than 4000 mg in a 24 hour period). May use interchangeably. Robitussin (guaifenesin) for cough. Chest rubs such as George's or Mentholatum may help reduce sore throat symptoms.  Chloraseptic spray for sore throat or menthol lozenges may be helpful for sore throat. Be reevaluated if symptoms persist longer than 10 - 14 days or worsen and if there is no improvement in 72 hours or worsening of symptoms.  Increase fluids. Complete all antibiotics even if feeling better. Taking antibiotics with food may decrease the stomach upset that can occur when taking antibiotics. Antibiotics frequently cause diarrhea. Probiotics or yogurt may help prevent or decrease these symptoms.    Look at over the counter medications to observe the product you are purchasing. Ephedrine and pseudoephedrine are not the best choice to take when you have high blood pressure.   TAKE YOUR BP MEDICATION EVEN WHEN YOU ARE NOT FEELING WELL.      Fluids, herbs, and foods for sore throat relief -- Adjusting the temperature and texture of foods and beverages may provide local relief of sore throat pain. While data showing benefit are quite limited, these approaches are intuitive. We typically advise these measures since they are likely to be safe with minimal adverse effect, and patients often describe relief of symptoms.  We suggest hydration with frozen (eg, ice or popsicles) or heated liquids (eg, teas,  soups), rather than room temperature or refrigerated fluids in patient with significant sore throat pain. Very cold foods can have a numbing-like effect that temporarily reduces or alleviates the pain of swallowing. Ice cubes or frozen popsicles facilitate hydration; ice cream and frozen yogurt provide caloric intake.  Warm fluids and foods, including teas, soups, and soft non-irritating foods, may be better tolerated by patients with throat pain than irritating foods (eg, rough-textured or spicy foods) or fluids at room temperatures. Foods that coat the throat, including honey and hard candies, can facilitate intake of calories while temporarily relieving throat pain.

## 2020-02-08 NOTE — ED TRIAGE NOTES
Pt presents with being sick for 3 weeks, cough, sweats, headaches, muscles/ bones ache, dizziness, poor appetite. States he is drinking water. Took Mucinex at 0600 today.

## 2020-02-08 NOTE — ED AVS SNAPSHOT
[facility]  [address] [address]  Phone:  [phone]                                    Rhett Moore   MRN: [id number]    Department:  [facility]   Date of Visit:  2/8/2020           After Visit Summary Signature Page    I have received my discharge instructions, and my questions have been answered. I have discussed any challenges I see with this plan with the nurse or doctor.    ..........................................................................................................................................  Patient/Patient Representative Signature      ..........................................................................................................................................  Patient Representative Print Name and Relationship to Patient    ..................................................               ................................................  Date                                   Time    ..........................................................................................................................................  Reviewed by Signature/Title    ...................................................              ..............................................  Date                                               Time          22EPIC Rev 08/18

## 2020-02-10 ENCOUNTER — TELEPHONE (OUTPATIENT)
Dept: FAMILY MEDICINE | Facility: OTHER | Age: 35
End: 2020-02-10

## 2020-02-10 LAB
BACTERIA SPEC CULT: NORMAL
SPECIMEN SOURCE: NORMAL

## 2020-02-10 NOTE — TELEPHONE ENCOUNTER
Patient calling and needs ED follow up for pneumonia. Was seen in ED on 2.8.2020. Feels little better and is on Z-pack. He not going to work tonight or probably not tomorrow night. He is asking for a work excuse.    Call back at  [phone]..  Thank you,    Cristiane oDve RN

## 2020-02-10 NOTE — TELEPHONE ENCOUNTER
Patient was seen in UC/ED 02/08/20 and was diagnosed with pneumonia. Mom states patient is looking much better but he has been coughing so much that it is causing chest pain. Mom wondering if there is any cough medicine that patient could take to help with cough.  Please advise. Pharmacy pended.    Verbal permission was given by son to speak with mom.    Mom phone:   [phone]    Patient phone:  [phone]

## 2020-02-10 NOTE — LETTER
[facility] [place]  [address]  [facility] [address]  Phone: [phone]  Re:  February 11, 2020        Rhett Moore  [address] [facility] [address]          To whom it may concern:    RE: Rhett Moore    Patient was seen and treated in ER 2/8/2020.  Please excuse through 2/11/2020.    Please contact me for questions or concerns.      Sincerely,        Cristiane Dove RN

## 2020-02-12 ENCOUNTER — OFFICE VISIT (OUTPATIENT)
Dept: FAMILY MEDICINE | Facility: OTHER | Age: 35
End: 2020-02-12
Attending: FAMILY MEDICINE
Payer: COMMERCIAL

## 2020-02-12 ENCOUNTER — NURSE TRIAGE (OUTPATIENT)
Dept: FAMILY MEDICINE | Facility: OTHER | Age: 35
End: 2020-02-12

## 2020-02-12 VITALS
OXYGEN SATURATION: 94 % | TEMPERATURE: 100.8 F | HEART RATE: 115 BPM | DIASTOLIC BLOOD PRESSURE: 64 MMHG | HEIGHT: 70 IN | WEIGHT: 315 LBS | SYSTOLIC BLOOD PRESSURE: 112 MMHG | BODY MASS INDEX: 45.1 KG/M2

## 2020-02-12 DIAGNOSIS — J18.9 PNEUMONIA DUE TO INFECTIOUS ORGANISM, UNSPECIFIED LATERALITY, UNSPECIFIED PART OF LUNG: Primary | ICD-10-CM

## 2020-02-12 DIAGNOSIS — R50.9 COUGH WITH FEVER: ICD-10-CM

## 2020-02-12 DIAGNOSIS — R05.9 COUGH WITH FEVER: ICD-10-CM

## 2020-02-12 LAB
BASOPHILS # BLD AUTO: 0.1 10E9/L (ref 0–0.2)
BASOPHILS NFR BLD AUTO: 0.7 %
CRP SERPL-MCNC: 188 MG/L (ref 0–8)
DIFFERENTIAL METHOD BLD: ABNORMAL
EOSINOPHIL # BLD AUTO: 0.2 10E9/L (ref 0–0.7)
EOSINOPHIL NFR BLD AUTO: 1.3 %
ERYTHROCYTE [DISTWIDTH] IN BLOOD BY AUTOMATED COUNT: 13.2 % (ref 10–15)
FLUAV+FLUBV RNA SPEC QL NAA+PROBE: NEGATIVE
FLUAV+FLUBV RNA SPEC QL NAA+PROBE: NEGATIVE
HCT VFR BLD AUTO: 40.7 % (ref 40–53)
HGB BLD-MCNC: 13.2 G/DL (ref 13.3–17.7)
IMM GRANULOCYTES # BLD: 0.3 10E9/L (ref 0–0.4)
IMM GRANULOCYTES NFR BLD: 2.2 %
LYMPHOCYTES # BLD AUTO: 2 10E9/L (ref 0.8–5.3)
LYMPHOCYTES NFR BLD AUTO: 13.4 %
MCH RBC QN AUTO: 28 PG (ref 26.5–33)
MCHC RBC AUTO-ENTMCNC: 32.4 G/DL (ref 31.5–36.5)
MCV RBC AUTO: 86 FL (ref 78–100)
MONOCYTES # BLD AUTO: 1.4 10E9/L (ref 0–1.3)
MONOCYTES NFR BLD AUTO: 9.5 %
NEUTROPHILS # BLD AUTO: 10.9 10E9/L (ref 1.6–8.3)
NEUTROPHILS NFR BLD AUTO: 72.9 %
NRBC # BLD AUTO: 0 10*3/UL
NRBC BLD AUTO-RTO: 0 /100
PLATELET # BLD AUTO: 492 10E9/L (ref 150–450)
RBC # BLD AUTO: 4.71 10E12/L (ref 4.4–5.9)
RSV RNA SPEC NAA+PROBE: NEGATIVE
SPECIMEN SOURCE: NORMAL
WBC # BLD AUTO: 14.9 10E9/L (ref 4–11)

## 2020-02-12 PROCEDURE — 85025 COMPLETE CBC W/AUTO DIFF WBC: CPT | Performed by: FAMILY MEDICINE

## 2020-02-12 PROCEDURE — 99214 OFFICE O/P EST MOD 30 MIN: CPT | Performed by: FAMILY MEDICINE

## 2020-02-12 PROCEDURE — 86140 C-REACTIVE PROTEIN: CPT | Performed by: FAMILY MEDICINE

## 2020-02-12 PROCEDURE — 87631 RESP VIRUS 3-5 TARGETS: CPT | Performed by: FAMILY MEDICINE

## 2020-02-12 PROCEDURE — 36415 COLL VENOUS BLD VENIPUNCTURE: CPT | Performed by: FAMILY MEDICINE

## 2020-02-12 ASSESSMENT — ENCOUNTER SYMPTOMS: APPETITE CHANGE: 1

## 2020-02-12 ASSESSMENT — MIFFLIN-ST. JEOR: SCORE: 2534.77

## 2020-02-12 ASSESSMENT — PAIN SCALES - GENERAL: PAINLEVEL: SEVERE PAIN (6)

## 2020-02-12 NOTE — TELEPHONE ENCOUNTER
"Patient seen in ER 2/8/2020 for pneumonia, symptoms are not resolving completely.  Continues with fever and harsh cough- cough brings a head ache.  Patient finished the z pack and states he has been using robitussin DM as advised by the ER provider.  Patient would like to be seen, this writer agrees that patient may need a follow up, due to unresolved symptoms.  Patient states overall he feels there was some improvement.  Patient scheduled.      Answer Assessment - Initial Assessment Questions  1. SYMPTOMS: \"What symptoms are you most concerned about?\" \"Is it better, the same, or worse compared to when you saw the doctor?\"      I still have a pretty significant fever ranging from 102 to 100.6 this morning.  My cough continues to linger, I have gone through two bottles of robitussin.    2. BREATHING DIFFICULTY: \"Are you having any difficulty breathing?\" If so, ask \"How bad is it?\"  (e.g., none, mild, moderate, severe)    - MILD: No SOB at rest, mild SOB with walking, speaks normally in sentences, can lay down, no retractions, pulse < 100.     - MODERATE: SOB at rest, SOB with minimal exertion and prefers to sit, cannot lie down flat, speaks in phrases, mild retractions, audible wheezing, pulse 100-120.     - SEVERE: Very SOB at rest, speaks in single words, struggling to breathe, sitting hunched forward, retractions, pulse > 120       I am not really having difficulty breathing, but th coughing is giving me a head ache   3. FEVER: \"Do you have a fever?\" If so, ask: \"What is your temperature, how was it measured, and when did it start?\"      Yes my fever continues- 102.0-100.6   4. SPUTUM: \"Describe the color of your sputum\" (clear, white, yellow, green, blood-tinged)      Clear   5. DIAGNOSIS CONFIRMATION: \"When was the pneumonia diagnosed?\" \"By whom?\"      2/8/2020  6. ANTIBIOTIC: \"Are you taking an antibiotic?\"  If so, \"Which one?\" \"When was it started?\"      Z pack   7. OTHER TREATMENT: \"Are you receiving any " "other treatment for the pneumonia?\" (e.g., albuterol nebulizer, oxygen) If so, ask, \"How often?\" and \"Do they help?\"      Robitussin DM every 4 hours   8. HOSPITAL ADMISSION: \"Were you hospitalized for this pneumonia?\" If so, ask \"When were you discharged home from the hospital?\"      NO    Protocols used: PNEUMONIA ON ANTIBIOTIC FOLLOW-UP CALL-A-AH      "

## 2020-02-12 NOTE — NURSING NOTE
"Chief Complaint   Patient presents with     ER F/U     pneumonia       Initial /64 (BP Location: Left arm, Patient Position: Sitting, Cuff Size: Adult Large)   Pulse 115   Temp 100.8  F (38.2  C) (Tympanic)   Ht 1.778 m (5' 10\")   Wt (!) 157.9 kg (348 lb)   SpO2 94%   BMI 49.93 kg/m   Estimated body mass index is 49.93 kg/m  as calculated from the following:    Height as of this encounter: 1.778 m (5' 10\").    Weight as of this encounter: 157.9 kg (348 lb).  Medication Reconciliation: complete  Justine Elias LPN  "

## 2020-02-12 NOTE — NURSING NOTE
"Chief Complaint   Patient presents with     Cough     followup       Initial /64 (BP Location: Left arm, Patient Position: Sitting, Cuff Size: Adult Large)   Pulse 115   Temp 100.8  F (38.2  C) (Tympanic)   Ht 1.778 m (5' 10\")   Wt (!) 157.9 kg (348 lb)   SpO2 94%   BMI 49.93 kg/m   Estimated body mass index is 49.93 kg/m  as calculated from the following:    Height as of this encounter: 1.778 m (5' 10\").    Weight as of this encounter: 157.9 kg (348 lb).  Medication Reconciliation: complete  Justine Elias LPN  "

## 2020-02-12 NOTE — PROGRESS NOTES
"Subjective     Rhett Moore is a 34 year old male who presents to clinic today for the following health issues:    HPI   ED[place] Followup:    [place]  [facility] [place]: 2/08/20  Reason for visit: pneumonia; had presented with 3 weeks of cough, congestion  Current Status: no improvement  -zpack- last dose today- pt not sleeping well d/t cough/fever/headache; slightly productive;  Fever 102 yesterday.  Treated with Zpak.  Dose of rocephin as well.  RLL pneumonia on xray.  Hadn't been on recent antibiotics.  No travel.  No specific contacts.  No prior pneumonia.           Patient Active Problem List   Diagnosis     Morbid obesity (H)     Hypothyroidism, unspecified type     Essential hypertension     Past Surgical History:   Procedure Laterality Date     APPENDECTOMY       TUBES         Social History     Tobacco Use     Smoking status: Never Smoker     Smokeless tobacco: Never Used   Substance Use Topics     Alcohol use: Yes     Family History   Problem Relation Age of Onset     Cancer Mother      Cerebrovascular Disease Father              Reviewed and updated as needed this visit by Provider  Allergies  Meds         Review of Systems   ROS COMP: Constitutional, HEENT, cardiovascular, pulmonary, gi and gu systems are negative, except as otherwise noted.      Objective    /64 (BP Location: Left arm, Patient Position: Sitting, Cuff Size: Adult Large)   Pulse 115   Temp 100.8  F (38.2  C) (Tympanic)   Ht 1.778 m (5' 10\")   Wt (!) 157.9 kg (348 lb)   SpO2 94%   BMI 49.93 kg/m    Body mass index is 49.93 kg/m .  Physical Exam   GENERAL: alert, no distress and obese  EYES: Eyes grossly normal to inspection, PERRL and conjunctivae and sclerae normal  HENT: ear canals and TM's normal, nose and mouth without ulcers or lesions  NECK: no adenopathy, no asymmetry, masses, or scars and thyroid normal to palpation  RESP: lungs clear to auscultation - no rales, rhonchi or wheezes  CV: regular rate and rhythm, " normal S1 S2, no S3 or S4, no murmur, click or rub, no peripheral edema and peripheral pulses strong  ABDOMEN: soft, nontender, no hepatosplenomegaly, no masses and bowel sounds normal  MS: no gross musculoskeletal defects noted, no edema  SKIN: no suspicious lesions or rashes  PSYCH: mentation appears normal, affect normal/bright    Diagnostic Test Results:  Labs reviewed in Epic  Results for orders placed or performed in visit on 02/12/20 (from the past 24 hour(s))   Influenza A /B and RSV PCR performed in [place]   Result Value Ref Range    Specimen Description Nasopharyngeal     Influenza A PCR Negative NEG^Negative    Influenza B PCR Negative NEG^Negative    Resp Syncytial Virus Negative NEG^Negative   CBC with platelets and differential   Result Value Ref Range    WBC 14.9 (H) 4.0 - 11.0 10e9/L    RBC Count 4.71 4.4 - 5.9 10e12/L    Hemoglobin 13.2 (L) 13.3 - 17.7 g/dL    Hematocrit 40.7 40.0 - 53.0 %    MCV 86 78 - 100 fl    MCH 28.0 26.5 - 33.0 pg    MCHC 32.4 31.5 - 36.5 g/dL    RDW 13.2 10.0 - 15.0 %    Platelet Count 492 (H) 150 - 450 10e9/L    Diff Method Automated Method     % Neutrophils 72.9 %    % Lymphocytes 13.4 %    % Monocytes 9.5 %    % Eosinophils 1.3 %    % Basophils 0.7 %    % Immature Granulocytes 2.2 %    Nucleated RBCs 0 0 /100    Absolute Neutrophil 10.9 (H) 1.6 - 8.3 10e9/L    Absolute Lymphocytes 2.0 0.8 - 5.3 10e9/L    Absolute Monocytes 1.4 (H) 0.0 - 1.3 10e9/L    Absolute Eosinophils 0.2 0.0 - 0.7 10e9/L    Absolute Basophils 0.1 0.0 - 0.2 10e9/L    Abs Immature Granulocytes 0.3 0 - 0.4 10e9/L    Absolute Nucleated RBC 0.0    CRP inflammation   Result Value Ref Range    CRP Inflammation 188.0 (H) 0.0 - 8.0 mg/L           Assessment & Plan       ICD-10-CM    1. Pneumonia due to infectious organism, unspecified laterality, unspecified part of lung J18.9 CBC with platelets and differential     CRP inflammation     amoxicillin-clavulanate (AUGMENTIN) 875-125 MG tablet   2. Cough with  "fever R05 Influenza A /B and RSV PCR performed in [place]    R50.9         BMI:   Estimated body mass index is 49.93 kg/m  as calculated from the following:    Height as of this encounter: 1.778 m (5' 10\").    Weight as of this encounter: 157.9 kg (348 lb).     Not clinically improving.  Xray was only 2 days ago - RLL suspicious for pneumonia.  Treated with Zpak only and dose of Rocephin.  Checked for potential viral coinfection - but negative for influenza.      CAP outpatient - Amoxicillin plus zpak.    Will complete zpak.  Given no improvement, will add Augmentin rather than Amoxicillin at this time.  If not responding, may need to consider Levaquin.     Xray at 4-6 weeks to assure clearance.  Close follow up if not clinically improving and fevers continue.    See Patient Instructions    Patient Instructions   Complete antibiotic course.  Repeat xray 1 month.  Follow up sooner if not improving.      No follow-ups on file.    Noelle Shin MD  [facility] [place]        "

## 2020-02-12 NOTE — LETTER
[facility] [place]  [address]  [place] [address]  Phone: [phone]    February 12, 2020      Re:  Rhett Moore  [address] [facility] [address]          To whom it may concern:    RE: Rhett Moore    Patient was seen and treated in ER 2/8/2020 and clinic follow up 2/12/2020.  Please excuse 2/10/2020 and 2/11/2020.   May return to work once 24 hours without a fever.    Please contact me for questions or concerns.      Sincerely,        Noelle Shin MD

## 2020-02-17 ENCOUNTER — TELEPHONE (OUTPATIENT)
Dept: FAMILY MEDICINE | Facility: OTHER | Age: 35
End: 2020-02-17

## 2020-02-17 NOTE — TELEPHONE ENCOUNTER
Still with high fevers.  Should be seen again to reassess.  Can see at 2:45, check in 2:30, or urgent care.

## 2020-02-17 NOTE — TELEPHONE ENCOUNTER
Pt report dx with pneumonia, calling back, still not feeling better, temp 102.1, coughing, congestion, fatigue still present. Tylenol 650 mg every 6 hours.     Pt willing to be seen and/or take Levaquin requesting PCP to advise on a plan. Pt will need a new letter as well for work. PCP's nurse to pend letter.

## 2020-02-17 NOTE — TELEPHONE ENCOUNTER
PT could not make it in by 230 due to living in [facility]; scheduled w/ help of Roxane for Wednesday 220 w/ Saji

## 2020-02-19 ENCOUNTER — OFFICE VISIT (OUTPATIENT)
Dept: FAMILY MEDICINE | Facility: OTHER | Age: 35
End: 2020-02-19
Attending: FAMILY MEDICINE
Payer: COMMERCIAL

## 2020-02-19 ENCOUNTER — ANCILLARY PROCEDURE (OUTPATIENT)
Dept: GENERAL RADIOLOGY | Facility: OTHER | Age: 35
End: 2020-02-19
Attending: FAMILY MEDICINE
Payer: COMMERCIAL

## 2020-02-19 VITALS
HEIGHT: 70 IN | OXYGEN SATURATION: 95 % | HEART RATE: 116 BPM | WEIGHT: 315 LBS | RESPIRATION RATE: 20 BRPM | BODY MASS INDEX: 45.1 KG/M2 | SYSTOLIC BLOOD PRESSURE: 128 MMHG | DIASTOLIC BLOOD PRESSURE: 76 MMHG | TEMPERATURE: 99.2 F

## 2020-02-19 DIAGNOSIS — J13: ICD-10-CM

## 2020-02-19 DIAGNOSIS — Z16.20: ICD-10-CM

## 2020-02-19 DIAGNOSIS — J18.9 PNEUMONIA OF LOWER LOBE DUE TO INFECTIOUS ORGANISM, UNSPECIFIED LATERALITY: ICD-10-CM

## 2020-02-19 DIAGNOSIS — J18.9 PNEUMONIA OF LOWER LOBE DUE TO INFECTIOUS ORGANISM, UNSPECIFIED LATERALITY: Primary | ICD-10-CM

## 2020-02-19 LAB
BASOPHILS # BLD AUTO: 0.1 10E9/L (ref 0–0.2)
BASOPHILS NFR BLD AUTO: 0.5 %
CRP SERPL-MCNC: 226 MG/L (ref 0–8)
DIFFERENTIAL METHOD BLD: ABNORMAL
EOSINOPHIL # BLD AUTO: 0.1 10E9/L (ref 0–0.7)
EOSINOPHIL NFR BLD AUTO: 0.9 %
ERYTHROCYTE [DISTWIDTH] IN BLOOD BY AUTOMATED COUNT: 13.2 % (ref 10–15)
HCT VFR BLD AUTO: 39.2 % (ref 40–53)
HGB BLD-MCNC: 12.4 G/DL (ref 13.3–17.7)
IMM GRANULOCYTES # BLD: 0.3 10E9/L (ref 0–0.4)
IMM GRANULOCYTES NFR BLD: 2 %
LYMPHOCYTES # BLD AUTO: 1.9 10E9/L (ref 0.8–5.3)
LYMPHOCYTES NFR BLD AUTO: 12.1 %
MCH RBC QN AUTO: 27.8 PG (ref 26.5–33)
MCHC RBC AUTO-ENTMCNC: 31.6 G/DL (ref 31.5–36.5)
MCV RBC AUTO: 88 FL (ref 78–100)
MONOCYTES # BLD AUTO: 1.2 10E9/L (ref 0–1.3)
MONOCYTES NFR BLD AUTO: 8 %
NEUTROPHILS # BLD AUTO: 11.7 10E9/L (ref 1.6–8.3)
NEUTROPHILS NFR BLD AUTO: 76.5 %
NRBC # BLD AUTO: 0 10*3/UL
NRBC BLD AUTO-RTO: 0 /100
PLATELET # BLD AUTO: 498 10E9/L (ref 150–450)
RBC # BLD AUTO: 4.46 10E12/L (ref 4.4–5.9)
WBC # BLD AUTO: 15.3 10E9/L (ref 4–11)

## 2020-02-19 PROCEDURE — 36415 COLL VENOUS BLD VENIPUNCTURE: CPT | Performed by: FAMILY MEDICINE

## 2020-02-19 PROCEDURE — 99214 OFFICE O/P EST MOD 30 MIN: CPT | Performed by: FAMILY MEDICINE

## 2020-02-19 PROCEDURE — 71046 X-RAY EXAM CHEST 2 VIEWS: CPT | Mod: TC

## 2020-02-19 PROCEDURE — 85025 COMPLETE CBC W/AUTO DIFF WBC: CPT | Performed by: FAMILY MEDICINE

## 2020-02-19 PROCEDURE — 86140 C-REACTIVE PROTEIN: CPT | Performed by: FAMILY MEDICINE

## 2020-02-19 RX ORDER — ACETAMINOPHEN 325 MG/1
325-650 TABLET ORAL EVERY 6 HOURS PRN
COMMUNITY
End: 2021-09-01

## 2020-02-19 RX ORDER — LEVOFLOXACIN 750 MG/1
750 TABLET, FILM COATED ORAL DAILY
Qty: 5 TABLET | Refills: 0 | Status: SHIPPED | OUTPATIENT
Start: 2020-02-19 | End: 2020-02-24

## 2020-02-19 RX ORDER — CODEINE PHOSPHATE AND GUAIFENESIN 10; 100 MG/5ML; MG/5ML
1-2 SOLUTION ORAL EVERY 4 HOURS PRN
Qty: 180 ML | Refills: 0 | Status: SHIPPED | OUTPATIENT
Start: 2020-02-19 | End: 2020-03-05

## 2020-02-19 ASSESSMENT — MIFFLIN-ST. JEOR: SCORE: 2534.77

## 2020-02-19 ASSESSMENT — PAIN SCALES - GENERAL: PAINLEVEL: NO PAIN (0)

## 2020-02-19 NOTE — PATIENT INSTRUCTIONS
1.  Cough syrup with codeine for cough  2.  Ibuprofen 200mg tablets -- take 3 tablets 3x/day to help with fever

## 2020-02-19 NOTE — PROGRESS NOTES
Subjective     Rhett Moore is a 34 year old male who presents to clinic today for the following health issues:    HPI   RESPIRATORY SYMPTOMS      Duration: 1 month; treated in ED for pneumonia 2/8 with zpak; follow up clinic 2/12 - augmentin added; clinic again 2/19/2020 repeat xray worsening RLL; labs still elevated wbc and crp, actually worse; changed to Levaquin    Description  cough and fever and diarrhea    Severity: moderate    Accompanying signs and symptoms: None    History (predisposing factors):  none    Precipitating or alleviating factors: None    Therapies tried and outcome:  Acetaminophen, cough syrup and antibiotics     Feeling better today    Febrile this morning 101.7, took Tylenol    Cough medicine improved cough and sleep    Still productive cough, clear sputum, less often    No chills today    Pains in chest with coughing    No shortness of breath    Good fluid intake/with urination      Current Outpatient Medications   Medication     acetaminophen 325 MG PO tablet     guaiFENesin-codeine 100-10 MG/5ML PO solution     levofloxacin 750 MG PO tablet     levothyroxine (SYNTHROID/LEVOTHROID) 112 MCG tablet     lisinopril (PRINIVIL/ZESTRIL) 10 MG tablet     No current facility-administered medications for this visit.        Patient Active Problem List   Diagnosis     Morbid obesity (H)     Hypothyroidism, unspecified type     Essential hypertension     Pneumonia due to drug resistant Streptococcus pneumoniae (H)     Pneumonia of lower lobe due to infectious organism, unspecified laterality (H)     Past Surgical History:   Procedure Laterality Date     APPENDECTOMY       TUBES         Social History     Tobacco Use     Smoking status: Never Smoker     Smokeless tobacco: Never Used   Substance Use Topics     Alcohol use: Yes     Family History   Problem Relation Age of Onset     Cancer Mother      Cerebrovascular Disease Father            Reviewed and updated as needed this visit by Provider          Review of Systems   ROS COMP: Constitutional, HEENT, cardiovascular, pulmonary, gi and gu systems are negative, except as otherwise noted.      Objective    /82 (BP Location: Right arm, Patient Position: Sitting, Cuff Size: Adult Large)   Pulse 108   Temp 98.2  F (36.8  C) (Tympanic)   SpO2 94%   There is no height or weight on file to calculate BMI.  Physical Exam   GENERAL: alert, no distress and obese  EYES: Eyes grossly normal to inspection, PERRL and conjunctivae and sclerae normal  HENT: ear canals and TM's normal, nose and mouth without ulcers or lesions  NECK: no adenopathy, no asymmetry, masses, or scars and thyroid normal to palpation  RESP: lungs clear to auscultation - no rales, rhonchi or wheezes  CV: regular rate and rhythm, normal S1 S2, no S3 or S4, no murmur, click or rub, no peripheral edema and peripheral pulses strong  ABDOMEN: soft, nontender, no hepatosplenomegaly, no masses and bowel sounds normal  MS: no gross musculoskeletal defects noted, no edema  PSYCH: mentation appears normal, affect normal/bright    Diagnostic Test Results:  Labs reviewed in Epic  No results found for this or any previous visit (from the past 24 hour(s)).  No results found for any visits on 02/21/20.        Assessment & Plan       ICD-10-CM    1. Pneumonia of right lower lobe due to infectious organism (H) J18.1 CBC with platelets and differential     CRP inflammation     XR Chest 2 Views        Some clinic improvement per report.  Lungs clear on exam.  Afebrile today, last Tylenol over 6 hours ago.   Did have fever last night.  Update labs.  Finish antibiotics.  See below.  Follow with xray in month to assure resolved.  If not clinically resolving, or if worsening, proceed with CT to rule out abscess, empyema, etc.      Patient Instructions   Complete course of Levaquin.  Repeat labs today.  Repeat chest xray at 1 month to assure resolution - xray order in place.  If diarrhea ongoing - lab stool check for  C Difficile.  Yogurt/probiotic advised.  [place] diet, adequate hydration.  If not improving, or if worsening, plan for CT scan next.  Can alternate Tylenol/Ibuprofen.  Use only if needed.          No follow-ups on file.    Noelle Shin MD  [facility] [place]

## 2020-02-19 NOTE — NURSING NOTE
"Chief Complaint   Patient presents with     RECHECK     2nd ER follow up       Initial /76 (BP Location: Left arm, Patient Position: Sitting, Cuff Size: Adult Large)   Pulse 116   Temp 99.2  F (37.3  C) (Tympanic)   Resp 20   Ht 1.778 m (5' 10\")   Wt (!) 157.9 kg (348 lb)   SpO2 95%   BMI 49.93 kg/m   Estimated body mass index is 49.93 kg/m  as calculated from the following:    Height as of this encounter: 1.778 m (5' 10\").    Weight as of this encounter: 157.9 kg (348 lb).  Medication Reconciliation: complete  Kalpana Brewster LPN    "

## 2020-02-19 NOTE — PROGRESS NOTES
"Subjective     Rhett Moore is a 34 year old male who presents to clinic today for the following health issues:    HPI   RESPIRATORY SYMPTOMS      Duration: ER visit 2/08 and follow up on 2/12    Description  cough and fever    Severity: moderate    Accompanying signs and symptoms: None    History (predisposing factors):  asthma    Precipitating or alleviating factors: none    Therapies tried and outcome:  Finished antibiotics but did \"throw up\" two of them    Patient has been still having fevers as high as 101, still coughing, not sleeping well      Patient Active Problem List   Diagnosis     Morbid obesity (H)     Hypothyroidism, unspecified type     Essential hypertension     Pneumonia due to drug resistant Streptococcus pneumoniae (H)     Pneumonia of lower lobe due to infectious organism, unspecified laterality (H)     Past Surgical History:   Procedure Laterality Date     APPENDECTOMY       TUBES         Social History     Tobacco Use     Smoking status: Never Smoker     Smokeless tobacco: Never Used   Substance Use Topics     Alcohol use: Yes     Family History   Problem Relation Age of Onset     Cancer Mother      Cerebrovascular Disease Father          Current Outpatient Medications   Medication Sig Dispense Refill     acetaminophen 325 MG PO tablet Take 325-650 mg by mouth every 6 hours as needed for mild pain       guaiFENesin-codeine 100-10 MG/5ML PO solution Take 5-10 mLs by mouth every 4 hours as needed for cough 180 mL 0     levofloxacin 750 MG PO tablet Take 1 tablet (750 mg) by mouth daily 5 tablet 0     levothyroxine (SYNTHROID/LEVOTHROID) 112 MCG tablet Take 1 tablet (112 mcg) by mouth daily 90 tablet 0     lisinopril (PRINIVIL/ZESTRIL) 10 MG tablet Take 1 tablet (10 mg) by mouth daily 90 tablet 2     No Known Allergies  BP Readings from Last 3 Encounters:   02/19/20 128/76   02/12/20 112/64   02/08/20 158/100    Wt Readings from Last 3 Encounters:   02/19/20 (!) 157.9 kg (348 lb)   02/12/20 (!) " "157.9 kg (348 lb)   06/20/19 (!) 166.5 kg (367 lb)         Reviewed and updated as needed this visit by Provider         Review of Systems   ROS COMP: Constitutional, HEENT, cardiovascular, pulmonary, gi and gu systems are negative, except as otherwise noted.      Objective    /76 (BP Location: Left arm, Patient Position: Sitting, Cuff Size: Adult Large)   Pulse 116   Temp 99.2  F (37.3  C) (Tympanic)   Resp 20   Ht 1.778 m (5' 10\")   Wt (!) 157.9 kg (348 lb)   SpO2 95%   BMI 49.93 kg/m    There is no height or weight on file to calculate BMI.  Physical Exam   GENERAL: healthy, alert, no distress and obese  EYES: Eyes grossly normal to inspection, PERRL and conjunctivae and sclerae normal  HENT: ear canals and TM's normal, nose and mouth without ulcers or lesions  NECK: no adenopathy, no asymmetry, masses, or scars and thyroid normal to palpation  RESP: lungs clear to auscultation - no rales, rhonchi or wheezes and rhonchi R lower posterior  CV: regular rate and rhythm, normal S1 S2, no S3 or S4, no murmur, click or rub, no peripheral edema and peripheral pulses strong  ABDOMEN: soft, nontender, no hepatosplenomegaly, no masses and bowel sounds normal  MS: no gross musculoskeletal defects noted, no edema  PSYCH: mentation appears normal, affect normal/bright    Diagnostic Test Results:  Labs reviewed in Epic  Results for orders placed or performed in visit on 02/19/20   XR CHEST 2 VW (Clinic Performed)     Status: None    Narrative    PROCEDURE:  XR CHEST 2 VW    HISTORY: Pneumonia of lower lobe due to infectious organism,  unspecified laterality (H), .    COMPARISON:  2/8/2020    FINDINGS:  The cardiomediastinal contours are normal.  The trachea is midline.  Consolidation of the right lower lobe appears worse when compared to  the immediate prior. No effusion or pneumothorax is seen.  No suspicious osseous lesion or subdiaphragmatic free air.      Impression    IMPRESSION:      Worse appearing " consolidation of the right lower lobe. Recommend  follow-up to resolution.      DAKSHA NGUYEN MD   Results for orders placed or performed in visit on 02/19/20   CBC with platelets differential     Status: Abnormal   Result Value Ref Range    WBC 15.3 (H) 4.0 - 11.0 10e9/L    RBC Count 4.46 4.4 - 5.9 10e12/L    Hemoglobin 12.4 (L) 13.3 - 17.7 g/dL    Hematocrit 39.2 (L) 40.0 - 53.0 %    MCV 88 78 - 100 fl    MCH 27.8 26.5 - 33.0 pg    MCHC 31.6 31.5 - 36.5 g/dL    RDW 13.2 10.0 - 15.0 %    Platelet Count 498 (H) 150 - 450 10e9/L    Diff Method Automated Method     % Neutrophils 76.5 %    % Lymphocytes 12.1 %    % Monocytes 8.0 %    % Eosinophils 0.9 %    % Basophils 0.5 %    % Immature Granulocytes 2.0 %    Nucleated RBCs 0 0 /100    Absolute Neutrophil 11.7 (H) 1.6 - 8.3 10e9/L    Absolute Lymphocytes 1.9 0.8 - 5.3 10e9/L    Absolute Monocytes 1.2 0.0 - 1.3 10e9/L    Absolute Eosinophils 0.1 0.0 - 0.7 10e9/L    Absolute Basophils 0.1 0.0 - 0.2 10e9/L    Abs Immature Granulocytes 0.3 0 - 0.4 10e9/L    Absolute Nucleated RBC 0.0    CRP, inflammation     Status: Abnormal   Result Value Ref Range    CRP Inflammation 226.0 (H) 0.0 - 8.0 mg/L           Assessment & Plan     (J18.1) Pneumonia of lower lobe due to infectious organism, unspecified laterality (H)  (primary encounter diagnosis)  Comment:   Plan: XR CHEST 2 VW (Clinic Performed), CBC with         platelets differential, CRP, inflammation,         levofloxacin 750 MG PO tablet,         guaiFENesin-codeine 100-10 MG/5ML PO solution        Appears to have resistant microorganism (presumed strep pneumoniae) wbc and anc continue to rise, CRP went down to 188 and now back up to 226  Close follow-up in 2 days --   Of note patient doesn't appear acutely ill as I would expect    (J13,  Z16.20) Pneumonia due to drug resistant Streptococcus pneumoniae (H)  Comment:   Plan: following [facility]ryant guidelines for drug resistant     BMI:   Estimated body mass index is 49.93  "kg/m  as calculated from the following:    Height as of this encounter: 1.778 m (5' 10\").    Weight as of this encounter: 157.9 kg (348 lb).   Weight management plan: Discussed healthy diet and exercise guidelines      Patient was agreeable to this plan and had no further questions.    Patient Instructions   1.  Cough syrup with codeine for cough  2.  Ibuprofen 200mg tablets -- take 3 tablets 3x/day to help with fever      No follow-ups on file.    Patricia Lennon MD  [facility] - [place]        "

## 2020-02-21 ENCOUNTER — OFFICE VISIT (OUTPATIENT)
Dept: FAMILY MEDICINE | Facility: OTHER | Age: 35
End: 2020-02-21
Attending: FAMILY MEDICINE
Payer: COMMERCIAL

## 2020-02-21 VITALS
DIASTOLIC BLOOD PRESSURE: 82 MMHG | OXYGEN SATURATION: 94 % | SYSTOLIC BLOOD PRESSURE: 120 MMHG | HEART RATE: 108 BPM | TEMPERATURE: 98.2 F

## 2020-02-21 DIAGNOSIS — J18.9 PNEUMONIA OF RIGHT LOWER LOBE DUE TO INFECTIOUS ORGANISM: Primary | ICD-10-CM

## 2020-02-21 LAB
BASOPHILS # BLD AUTO: 0.1 10E9/L (ref 0–0.2)
BASOPHILS NFR BLD AUTO: 0.7 %
CRP SERPL-MCNC: 251 MG/L (ref 0–8)
DIFFERENTIAL METHOD BLD: ABNORMAL
EOSINOPHIL # BLD AUTO: 0.2 10E9/L (ref 0–0.7)
EOSINOPHIL NFR BLD AUTO: 0.9 %
ERYTHROCYTE [DISTWIDTH] IN BLOOD BY AUTOMATED COUNT: 13.3 % (ref 10–15)
HCT VFR BLD AUTO: 40.8 % (ref 40–53)
HGB BLD-MCNC: 12.8 G/DL (ref 13.3–17.7)
IMM GRANULOCYTES # BLD: 0.4 10E9/L (ref 0–0.4)
IMM GRANULOCYTES NFR BLD: 2.2 %
LYMPHOCYTES # BLD AUTO: 1.9 10E9/L (ref 0.8–5.3)
LYMPHOCYTES NFR BLD AUTO: 11 %
MCH RBC QN AUTO: 27.6 PG (ref 26.5–33)
MCHC RBC AUTO-ENTMCNC: 31.4 G/DL (ref 31.5–36.5)
MCV RBC AUTO: 88 FL (ref 78–100)
MONOCYTES # BLD AUTO: 1.6 10E9/L (ref 0–1.3)
MONOCYTES NFR BLD AUTO: 9.3 %
NEUTROPHILS # BLD AUTO: 12.8 10E9/L (ref 1.6–8.3)
NEUTROPHILS NFR BLD AUTO: 75.9 %
NRBC # BLD AUTO: 0 10*3/UL
NRBC BLD AUTO-RTO: 0 /100
PLATELET # BLD AUTO: 542 10E9/L (ref 150–450)
RBC # BLD AUTO: 4.64 10E12/L (ref 4.4–5.9)
WBC # BLD AUTO: 16.9 10E9/L (ref 4–11)

## 2020-02-21 PROCEDURE — 36415 COLL VENOUS BLD VENIPUNCTURE: CPT | Performed by: FAMILY MEDICINE

## 2020-02-21 PROCEDURE — 85025 COMPLETE CBC W/AUTO DIFF WBC: CPT | Performed by: FAMILY MEDICINE

## 2020-02-21 PROCEDURE — 99213 OFFICE O/P EST LOW 20 MIN: CPT | Performed by: FAMILY MEDICINE

## 2020-02-21 PROCEDURE — 86140 C-REACTIVE PROTEIN: CPT | Performed by: FAMILY MEDICINE

## 2020-02-21 ASSESSMENT — PAIN SCALES - GENERAL: PAINLEVEL: NO PAIN (0)

## 2020-02-21 NOTE — PATIENT INSTRUCTIONS
Complete course of Levaquin.  Repeat labs today.  Repeat chest xray at 1 month to assure resolution - xray order in place.  If diarrhea ongoing - lab stool check for C Difficile.  Yogurt/probiotic advised.  [place] diet, adequate hydration.  If not improving, or if worsening, plan for CT scan next.  Can alternate Tylenol/Ibuprofen.  Use only if needed.

## 2020-02-21 NOTE — NURSING NOTE
"Chief Complaint   Patient presents with     RECHECK       Initial /82 (BP Location: Right arm, Patient Position: Sitting, Cuff Size: Adult Large)   Pulse 108   Temp 98.2  F (36.8  C) (Tympanic)   SpO2 94%  Estimated body mass index is 49.93 kg/m  as calculated from the following:    Height as of 2/19/20: 1.778 m (5' 10\").    Weight as of 2/19/20: 157.9 kg (348 lb).  Medication Reconciliation: complete  Juan C Rao LPN  "

## 2020-02-24 ENCOUNTER — OFFICE VISIT (OUTPATIENT)
Dept: FAMILY MEDICINE | Facility: OTHER | Age: 35
End: 2020-02-24
Attending: FAMILY MEDICINE
Payer: COMMERCIAL

## 2020-02-24 ENCOUNTER — NURSE TRIAGE (OUTPATIENT)
Dept: FAMILY MEDICINE | Facility: OTHER | Age: 35
End: 2020-02-24

## 2020-02-24 VITALS
WEIGHT: 315 LBS | SYSTOLIC BLOOD PRESSURE: 128 MMHG | OXYGEN SATURATION: 94 % | BODY MASS INDEX: 45.1 KG/M2 | HEART RATE: 108 BPM | HEIGHT: 70 IN | DIASTOLIC BLOOD PRESSURE: 72 MMHG | TEMPERATURE: 99.9 F

## 2020-02-24 DIAGNOSIS — K92.1 BLOOD IN STOOL: Primary | ICD-10-CM

## 2020-02-24 DIAGNOSIS — J18.9 PNEUMONIA OF LOWER LOBE DUE TO INFECTIOUS ORGANISM, UNSPECIFIED LATERALITY: ICD-10-CM

## 2020-02-24 DIAGNOSIS — D64.9 ANEMIA, UNSPECIFIED TYPE: ICD-10-CM

## 2020-02-24 LAB
BASOPHILS # BLD AUTO: 0.1 10E9/L (ref 0–0.2)
BASOPHILS NFR BLD AUTO: 0.6 %
CRP SERPL-MCNC: 186 MG/L (ref 0–8)
DIFFERENTIAL METHOD BLD: ABNORMAL
EOSINOPHIL # BLD AUTO: 0.2 10E9/L (ref 0–0.7)
EOSINOPHIL NFR BLD AUTO: 1.3 %
ERYTHROCYTE [DISTWIDTH] IN BLOOD BY AUTOMATED COUNT: 13.4 % (ref 10–15)
HCT VFR BLD AUTO: 39.6 % (ref 40–53)
HGB BLD-MCNC: 12.4 G/DL (ref 13.3–17.7)
IMM GRANULOCYTES # BLD: 0.4 10E9/L (ref 0–0.4)
IMM GRANULOCYTES NFR BLD: 2.6 %
LYMPHOCYTES # BLD AUTO: 1.7 10E9/L (ref 0.8–5.3)
LYMPHOCYTES NFR BLD AUTO: 11.9 %
MCH RBC QN AUTO: 27.4 PG (ref 26.5–33)
MCHC RBC AUTO-ENTMCNC: 31.3 G/DL (ref 31.5–36.5)
MCV RBC AUTO: 88 FL (ref 78–100)
MONOCYTES # BLD AUTO: 1.3 10E9/L (ref 0–1.3)
MONOCYTES NFR BLD AUTO: 9.1 %
NEUTROPHILS # BLD AUTO: 10.7 10E9/L (ref 1.6–8.3)
NEUTROPHILS NFR BLD AUTO: 74.5 %
NRBC # BLD AUTO: 0 10*3/UL
NRBC BLD AUTO-RTO: 0 /100
PLATELET # BLD AUTO: 438 10E9/L (ref 150–450)
RBC # BLD AUTO: 4.52 10E12/L (ref 4.4–5.9)
WBC # BLD AUTO: 14.3 10E9/L (ref 4–11)

## 2020-02-24 PROCEDURE — 85025 COMPLETE CBC W/AUTO DIFF WBC: CPT | Performed by: FAMILY MEDICINE

## 2020-02-24 PROCEDURE — 86140 C-REACTIVE PROTEIN: CPT | Performed by: FAMILY MEDICINE

## 2020-02-24 PROCEDURE — 36415 COLL VENOUS BLD VENIPUNCTURE: CPT | Performed by: FAMILY MEDICINE

## 2020-02-24 PROCEDURE — 99214 OFFICE O/P EST MOD 30 MIN: CPT | Performed by: FAMILY MEDICINE

## 2020-02-24 ASSESSMENT — MIFFLIN-ST. JEOR: SCORE: 2534.77

## 2020-02-24 ASSESSMENT — PAIN SCALES - GENERAL: PAINLEVEL: NO PAIN (0)

## 2020-02-24 NOTE — NURSING NOTE
"Chief Complaint   Patient presents with     Rectal Problem       Initial /72 (BP Location: Right arm, Patient Position: Sitting, Cuff Size: Adult Large)   Pulse 108   Temp 99.9  F (37.7  C) (Tympanic)   Ht 1.778 m (5' 10\")   Wt (!) 157.9 kg (348 lb)   SpO2 94%   BMI 49.93 kg/m   Estimated body mass index is 49.93 kg/m  as calculated from the following:    Height as of this encounter: 1.778 m (5' 10\").    Weight as of this encounter: 157.9 kg (348 lb).  Medication Reconciliation: complete  Justine Elias LPN  "

## 2020-02-24 NOTE — PATIENT INSTRUCTIONS
Labs improving.  Keep hydrated.  Symptomatic cares.  Repeat lab only in 1 week - non-fasting.  Call if progression of symptoms sooner.

## 2020-02-24 NOTE — LETTER
[facility] [place]  [address]  [place] [address]  Phone: [phone]    February 24, 2020      Re:  Rhett Moore  [address] [facility] [address]          To whom it may concern:    RE: Rhett Moore    Patient was seen and treated today at the ER 2/8/2020, then in office for follow up 2/12/2020, 2/19/2020, 2/21/2020, and 2/24/2020.  Please excuse him through 2/26/2020.  May return to work 2/27/2020 without restrictions.    Please contact me for questions or concerns.      Sincerely,        Noelle Shin MD

## 2020-02-24 NOTE — PROGRESS NOTES
Subjective     Rhett Moore is a 34 year old male who presents to clinic today for the following health issues:    HPI   Blood in stool       Duration: 2/22/2020 noted    Description (location/character/radiation): in stool, pinkish bright red    Intensity: mild    Accompanying signs and symptoms: diaharria     History (similar episodes/previous evaluation): None    Precipitating or alleviating factors: unknown    Therapies tried and outcome: probiotic introduced 2/23/2020    Started BRAT diet Friday - 3 days ago    Diarrhea Saturday morning with red color to toilet water    Solid stool Maurice morning    No pain with bowel movement    More bright red color with wiping    Started probiotic Sunday    Finished antibiotic this morning    Low grade fever over the weekend, no Tylenol since 3 days ago    No chills    Urination normal, no blood    No lightheadedness/dizziness    Still some coughing, phlegm, clear sputum; using Robitussin AC; improving    Has been out of work; works at [employer];              Current Outpatient Medications   Medication     guaiFENesin-codeine 100-10 MG/5ML PO solution     levothyroxine (SYNTHROID/LEVOTHROID) 112 MCG tablet     lisinopril (PRINIVIL/ZESTRIL) 10 MG tablet     acetaminophen 325 MG PO tablet     No current facility-administered medications for this visit.        Patient Active Problem List   Diagnosis     Morbid obesity (H)     Hypothyroidism, unspecified type     Essential hypertension     Pneumonia due to drug resistant Streptococcus pneumoniae (H)     Pneumonia of lower lobe due to infectious organism, unspecified laterality (H)     Past Surgical History:   Procedure Laterality Date     APPENDECTOMY       TUBES         Social History     Tobacco Use     Smoking status: Never Smoker     Smokeless tobacco: Never Used   Substance Use Topics     Alcohol use: Yes     Family History   Problem Relation Age of Onset     Cancer Mother      Cerebrovascular Disease  "Father            Reviewed and updated as needed this visit by Provider  Allergies  Meds         Review of Systems   ROS COMP: Constitutional, HEENT, cardiovascular, pulmonary, gi and gu systems are negative, except as otherwise noted.      Objective    /72 (BP Location: Right arm, Patient Position: Sitting, Cuff Size: Adult Large)   Pulse 108   Temp 99.9  F (37.7  C) (Tympanic)   Ht 1.778 m (5' 10\")   Wt (!) 157.9 kg (348 lb)   SpO2 94%   BMI 49.93 kg/m    Body mass index is 49.93 kg/m .  Physical Exam   GENERAL: alert, no distress and obese  EYES: Eyes grossly normal to inspection, PERRL and conjunctivae and sclerae normal  HENT: normal cephalic/atraumatic, right ear: clear effusion, left ear: normal: no effusions, no erythema, normal landmarks, nose and mouth without ulcers or lesions, oropharynx clear and oral mucous membranes moist  NECK: no adenopathy, no asymmetry, masses, or scars and thyroid normal to palpation  RESP: lungs clear to auscultation - no rales, rhonchi or wheezes  CV: regular rate and rhythm, normal S1 S2, no S3 or S4, no murmur, click or rub, no peripheral edema and peripheral pulses strong  ABDOMEN: soft, nontender, no hepatosplenomegaly, no masses and bowel sounds normal  MS: no gross musculoskeletal defects noted, no edema  SKIN: no suspicious lesions or rashes  PSYCH: mentation appears normal, affect normal/bright    Diagnostic Test Results:  Labs reviewed in Epic  Results for orders placed or performed in visit on 02/24/20 (from the past 24 hour(s))   CBC with platelets and differential   Result Value Ref Range    WBC 14.3 (H) 4.0 - 11.0 10e9/L    RBC Count 4.52 4.4 - 5.9 10e12/L    Hemoglobin 12.4 (L) 13.3 - 17.7 g/dL    Hematocrit 39.6 (L) 40.0 - 53.0 %    MCV 88 78 - 100 fl    MCH 27.4 26.5 - 33.0 pg    MCHC 31.3 (L) 31.5 - 36.5 g/dL    RDW 13.4 10.0 - 15.0 %    Platelet Count 438 150 - 450 10e9/L    Diff Method Automated Method     % Neutrophils 74.5 %    % Lymphocytes " 11.9 %    % Monocytes 9.1 %    % Eosinophils 1.3 %    % Basophils 0.6 %    % Immature Granulocytes 2.6 %    Nucleated RBCs 0 0 /100    Absolute Neutrophil 10.7 (H) 1.6 - 8.3 10e9/L    Absolute Lymphocytes 1.7 0.8 - 5.3 10e9/L    Absolute Monocytes 1.3 0.0 - 1.3 10e9/L    Absolute Eosinophils 0.2 0.0 - 0.7 10e9/L    Absolute Basophils 0.1 0.0 - 0.2 10e9/L    Abs Immature Granulocytes 0.4 0 - 0.4 10e9/L    Absolute Nucleated RBC 0.0    CRP inflammation   Result Value Ref Range    CRP Inflammation 186.0 (H) 0.0 - 8.0 mg/L           Assessment & Plan       ICD-10-CM    1. Blood in stool K92.1 CBC with platelets and differential     CRP inflammation   2. Pneumonia of lower lobe due to infectious organism, unspecified laterality (H) J18.1 CBC with platelets and differential     CRP inflammation     CRP inflammation     CBC with platelets and differential   3. Anemia, unspecified type D64.9 CRP inflammation     CBC with platelets and differential        Labs finally heading in the right direction.  Pneumonia clinically improving.  Clear lungs on exam today and last visit.    Follow to normal.  Repeat 1 week.  Xray at 1 month from last to assure complete resolution.  Small amount of blood in stool - likely from prior loose stools, multiple stools which have resolved.  If hemoglobin continues to be low, will need to re-evaluate.    Patient Instructions   Labs improving.  Keep hydrated.  Symptomatic cares.  Repeat lab only in 1 week - non-fasting.  Call if progression of symptoms sooner.      No follow-ups on file.    Noelle Shin MD  [facility] - [place]

## 2020-02-24 NOTE — TELEPHONE ENCOUNTER
Pt calling and started to notice some blood in his stool.No blood clots in stool.The water is pink when he has a BM, but he states not all the water. No abdominal pain.This AM he had it and started Saturday. Fever 99.0.Has pneumonia and took last Levaquin today.He states that he had a little itchy this AM. He denies he has hemorroids.Per protocol needs to be seen today in office and no availability needs to go to UC. Denies dizziness. Updated if s/s worsen he needs to go to ED/UC.He verbalized understanding.  Additional Information    Negative: Sounds like a life-threatening emergency to the triager    Negative: Diarrhea is the main symptom    Negative: Constipation is the main symptom (e.g., pain or discomfort caused by passage of hard BMs)    Blood in or on bowel movement is the main symptom    Negative: Diarrhea is the main symptom    Rectal symptoms    Negative: Sexual assault    Negative: Injury to rectum    Negative: Patient sounds very sick or weak to the triager    Negative: Severe rectal pain    Negative: Rectal pain or redness and fever > 100.5 F (38.1 C)    Negative: Acute onset rectal pain and constipation (straining with rectal pressure or fullness), which is not relieved by Sitz bath or suppository    Negative: MODERATE-SEVERE rectal pain (i.e., interferes with school, work, or sleep)    Negative: MODERATE-SEVERE rectal itching (i.e., interferes with school, work, or sleep)    Negative: Last bowel movement (BM) > 4 days ago    Negative: Rectal area looks infected (e.g., draining sore, spreading redness)    Negative: Rash of rectal area (e.g., open sore, painful tiny water blisters, unexplained bumps)    Negative: Caller is worried about a sexually transmitted disease (STD)    Negative: Home treatment > 3 days for rectal pain and not improved    Negative: Home treatment for > 3 days for rectal itching and not improved    Negative: Patient wants to be seen    Negative: Mild rectal pain    Negative: Mild  "rectal itching    Negative: Acute onset rectal pain and constipation (i.e., straining with rectal pressure or fullness) that is untreated    Negative: Recurrent episodes of unexplained rectal pain, but NO rectal symptoms now    Negative: Painless lump in rectal area    Negative: Passed out (i.e., fainted, collapsed and was not responding)    Negative: Shock suspected (e.g., cold/pale/clammy skin, too weak to stand, low BP, rapid pulse)    Negative: Vomiting red blood or black (coffee ground) material    Negative: Sounds like a life-threatening emergency to the triager    Negative: SEVERE rectal bleeding (large blood clots; on and off, or constant bleeding)    Negative: SEVERE dizziness (e.g., unable to stand, requires support to walk, feels like passing out now)    Negative: MODERATE rectal bleeding (small blood clots, passing blood without stool, or toilet water turns red) more than once a day    Negative: Bloody, black, or tarry bowel movements    Negative: High-risk adult (e.g., prior surgery on aorta, abdominal aortic aneurysm)    Negative: Rectal foreign body (inserted or swallowed)    Negative: SEVERE abdominal pain (e.g., excruciating)    Negative: Constant abdominal pain lasting > 2 hours    Negative: Pale skin (pallor) of new onset or worsening    Negative: Patient sounds very sick or weak to the triager    Negative: Taking Coumadin (warfarin) or other strong blood thinner, or known bleeding disorder (e.g., thrombocytopenia)    Negative: Colonoscopy in past 72 hours    Negative: Known cirrhosis of the liver (or history of liver failure or ascites)    MODERATE rectal bleeding (small blood clots, passing blood without stool, or toilet water turns red)    Negative: MILD rectal bleeding (more than just a few drops or streaks)    Answer Assessment - Initial Assessment Questions  1. SYMPTOM:  \"What's the main symptom you're concerned about?\" (e.g., pain, itching, swelling, rash)      Blood in stool  2. ONSET: " "\"When did the  start?\"      Saturday  3. RECTAL PAIN: \"Do you have any pain around your rectum?\" \"How bad is the pain?\"  (Scale 1-10; or mild, moderate, severe)   - MILD (1-3): doesn't interfere with normal activities    - MODERATE (4-7): interferes with normal activities or awakens from sleep, limping    - SEVERE (8-10): excruciating pain, unable to have a bowel movement       no  4. RECTAL ITCHING: \"Do you have any itching in this area?\" \"How bad is the itching?\"  (Scale 1-10; or mild, moderate, severe)   - MILD - doesn't interfere with normal activities    - MODERATE-SEVERE: interferes with normal activities or awakens from sleep      no  5. CONSTIPATION: \"Do you have constipation?\" If so, \"How bad is it?\"      no  6. CAUSE: \"What do you think is causing the anus symptoms?\"      no  7. OTHER SYMPTOMS: \"Do you have any other symptoms?\"  (e.g., rectal bleeding, abdominal pain, vomiting, fever)     Fever-temp this 99.7  8. PREGNANCY: \"Is there any chance you are pregnant?\" \"When was your last menstrual period?\"      no    Protocols used: RECTAL SYMPTOMS-A-OH, RECTAL BLEEDING-A-OH      "

## 2020-02-26 ENCOUNTER — OFFICE VISIT (OUTPATIENT)
Dept: INTERNAL MEDICINE | Facility: OTHER | Age: 35
End: 2020-02-26
Attending: INTERNAL MEDICINE

## 2020-02-26 ENCOUNTER — APPOINTMENT (OUTPATIENT)
Dept: OCCUPATIONAL MEDICINE | Facility: OTHER | Age: 35
End: 2020-02-26

## 2020-02-26 DIAGNOSIS — Z76.89 RETURN TO WORK EXAM: Primary | ICD-10-CM

## 2020-02-26 PROCEDURE — 99499 UNLISTED E&M SERVICE: CPT

## 2020-03-03 ENCOUNTER — ANCILLARY PROCEDURE (OUTPATIENT)
Dept: GENERAL RADIOLOGY | Facility: OTHER | Age: 35
End: 2020-03-03
Attending: FAMILY MEDICINE
Payer: COMMERCIAL

## 2020-03-03 DIAGNOSIS — J18.9 PNEUMONIA OF RIGHT LOWER LOBE DUE TO INFECTIOUS ORGANISM: ICD-10-CM

## 2020-03-03 PROCEDURE — 71046 X-RAY EXAM CHEST 2 VIEWS: CPT | Mod: TC

## 2020-03-04 ENCOUNTER — TELEPHONE (OUTPATIENT)
Dept: FAMILY MEDICINE | Facility: OTHER | Age: 35
End: 2020-03-04

## 2020-03-04 DIAGNOSIS — Z16.20: ICD-10-CM

## 2020-03-04 DIAGNOSIS — J18.9 PNEUMONIA DUE TO INFECTIOUS ORGANISM, UNSPECIFIED LATERALITY, UNSPECIFIED PART OF LUNG: ICD-10-CM

## 2020-03-04 DIAGNOSIS — J13: ICD-10-CM

## 2020-03-04 DIAGNOSIS — J18.9 PNEUMONIA OF LOWER LOBE DUE TO INFECTIOUS ORGANISM, UNSPECIFIED LATERALITY: Primary | ICD-10-CM

## 2020-03-04 NOTE — TELEPHONE ENCOUNTER
Patient called clinic back for X-ray results. Patient informed of results and provider's recommendation. Patient agreeable to CT scan.

## 2020-03-05 ENCOUNTER — TELEPHONE (OUTPATIENT)
Dept: FAMILY MEDICINE | Facility: OTHER | Age: 35
End: 2020-03-05

## 2020-03-05 ENCOUNTER — HOSPITAL ENCOUNTER (INPATIENT)
Facility: HOSPITAL | Age: 35
LOS: 1 days | Discharge: HOME | End: 2020-03-06
Attending: FAMILY MEDICINE | Admitting: INTERNAL MEDICINE
Payer: COMMERCIAL

## 2020-03-05 ENCOUNTER — APPOINTMENT (OUTPATIENT)
Dept: CT IMAGING | Facility: HOSPITAL | Age: 35
End: 2020-03-05
Attending: INTERNAL MEDICINE
Payer: COMMERCIAL

## 2020-03-05 ENCOUNTER — OFFICE VISIT (OUTPATIENT)
Dept: FAMILY MEDICINE | Facility: OTHER | Age: 35
End: 2020-03-05
Attending: FAMILY MEDICINE
Payer: COMMERCIAL

## 2020-03-05 VITALS
TEMPERATURE: 101 F | SYSTOLIC BLOOD PRESSURE: 120 MMHG | HEART RATE: 127 BPM | OXYGEN SATURATION: 92 % | WEIGHT: 315 LBS | BODY MASS INDEX: 45.1 KG/M2 | DIASTOLIC BLOOD PRESSURE: 78 MMHG | RESPIRATION RATE: 20 BRPM | HEIGHT: 70 IN

## 2020-03-05 DIAGNOSIS — R93.89 ABNORMAL CHEST X-RAY: ICD-10-CM

## 2020-03-05 DIAGNOSIS — R50.9 FEVER, UNSPECIFIED FEVER CAUSE: ICD-10-CM

## 2020-03-05 DIAGNOSIS — J18.9 PNEUMONIA OF LOWER LOBE DUE TO INFECTIOUS ORGANISM, UNSPECIFIED LATERALITY: Primary | ICD-10-CM

## 2020-03-05 DIAGNOSIS — R09.02 HYPOXIA: ICD-10-CM

## 2020-03-05 DIAGNOSIS — J18.9 PNEUMONIA OF RIGHT LOWER LOBE DUE TO INFECTIOUS ORGANISM: Primary | ICD-10-CM

## 2020-03-05 DIAGNOSIS — E03.9 HYPOTHYROIDISM, UNSPECIFIED TYPE: ICD-10-CM

## 2020-03-05 LAB
ALBUMIN SERPL-MCNC: 2.6 G/DL (ref 3.4–5)
ALP SERPL-CCNC: 114 U/L (ref 40–150)
ALT SERPL W P-5'-P-CCNC: 83 U/L (ref 0–70)
ANION GAP SERPL CALCULATED.3IONS-SCNC: 8 MMOL/L (ref 3–14)
AST SERPL W P-5'-P-CCNC: 47 U/L (ref 0–45)
BASOPHILS # BLD AUTO: 0.1 10E9/L (ref 0–0.2)
BASOPHILS NFR BLD AUTO: 0.6 %
BILIRUB SERPL-MCNC: 0.9 MG/DL (ref 0.2–1.3)
BUN SERPL-MCNC: 8 MG/DL (ref 7–30)
CALCIUM SERPL-MCNC: 8.9 MG/DL (ref 8.5–10.1)
CHLORIDE SERPL-SCNC: 100 MMOL/L (ref 94–109)
CO2 SERPL-SCNC: 25 MMOL/L (ref 20–32)
CREAT SERPL-MCNC: 0.86 MG/DL (ref 0.66–1.25)
CRP SERPL-MCNC: 158 MG/L (ref 0–8)
DIFFERENTIAL METHOD BLD: ABNORMAL
EOSINOPHIL # BLD AUTO: 0.1 10E9/L (ref 0–0.7)
EOSINOPHIL NFR BLD AUTO: 0.6 %
ERYTHROCYTE [DISTWIDTH] IN BLOOD BY AUTOMATED COUNT: 13.7 % (ref 10–15)
GFR SERPL CREATININE-BSD FRML MDRD: >90 ML/MIN/{1.73_M2}
GLUCOSE SERPL-MCNC: 102 MG/DL (ref 70–99)
HCT VFR BLD AUTO: 37.5 % (ref 40–53)
HGB BLD-MCNC: 11.8 G/DL (ref 13.3–17.7)
IMM GRANULOCYTES # BLD: 0.4 10E9/L (ref 0–0.4)
IMM GRANULOCYTES NFR BLD: 2.1 %
LACTATE BLD-SCNC: 1.1 MMOL/L (ref 0.7–2)
LDH SERPL L TO P-CCNC: 176 U/L (ref 85–227)
LYMPHOCYTES # BLD AUTO: 1.9 10E9/L (ref 0.8–5.3)
LYMPHOCYTES NFR BLD AUTO: 10.4 %
MCH RBC QN AUTO: 27.1 PG (ref 26.5–33)
MCHC RBC AUTO-ENTMCNC: 31.5 G/DL (ref 31.5–36.5)
MCV RBC AUTO: 86 FL (ref 78–100)
MONOCYTES # BLD AUTO: 1.5 10E9/L (ref 0–1.3)
MONOCYTES NFR BLD AUTO: 8.1 %
NEUTROPHILS # BLD AUTO: 14.2 10E9/L (ref 1.6–8.3)
NEUTROPHILS NFR BLD AUTO: 78.2 %
NRBC # BLD AUTO: 0 10*3/UL
NRBC BLD AUTO-RTO: 0 /100
PLATELET # BLD AUTO: 478 10E9/L (ref 150–450)
POTASSIUM SERPL-SCNC: 3.9 MMOL/L (ref 3.4–5.3)
PROCALCITONIN SERPL-MCNC: 0.13 NG/ML
PROT SERPL-MCNC: 7.9 G/DL (ref 6.8–8.8)
RBC # BLD AUTO: 4.35 10E12/L (ref 4.4–5.9)
SODIUM SERPL-SCNC: 133 MMOL/L (ref 133–144)
WBC # BLD AUTO: 18.2 10E9/L (ref 4–11)

## 2020-03-05 PROCEDURE — 36415 COLL VENOUS BLD VENIPUNCTURE: CPT | Performed by: INTERNAL MEDICINE

## 2020-03-05 PROCEDURE — 71260 CT THORAX DX C+: CPT | Mod: TC

## 2020-03-05 PROCEDURE — 80053 COMPREHEN METABOLIC PANEL: CPT | Performed by: FAMILY MEDICINE

## 2020-03-05 PROCEDURE — 87899 AGENT NOS ASSAY W/OPTIC: CPT | Performed by: INTERNAL MEDICINE

## 2020-03-05 PROCEDURE — 87070 CULTURE OTHR SPECIMN AEROBIC: CPT | Performed by: INTERNAL MEDICINE

## 2020-03-05 PROCEDURE — 84145 PROCALCITONIN (PCT): CPT | Performed by: INTERNAL MEDICINE

## 2020-03-05 PROCEDURE — 86038 ANTINUCLEAR ANTIBODIES: CPT | Performed by: INTERNAL MEDICINE

## 2020-03-05 PROCEDURE — 36415 COLL VENOUS BLD VENIPUNCTURE: CPT | Performed by: FAMILY MEDICINE

## 2020-03-05 PROCEDURE — 86635 COCCIDIOIDES ANTIBODY: CPT | Performed by: INTERNAL MEDICINE

## 2020-03-05 PROCEDURE — 83615 LACTATE (LD) (LDH) ENZYME: CPT | Performed by: FAMILY MEDICINE

## 2020-03-05 PROCEDURE — 87040 BLOOD CULTURE FOR BACTERIA: CPT | Performed by: INTERNAL MEDICINE

## 2020-03-05 PROCEDURE — 83516 IMMUNOASSAY NONANTIBODY: CPT | Performed by: INTERNAL MEDICINE

## 2020-03-05 PROCEDURE — 87103 BLOOD FUNGUS CULTURE: CPT | Performed by: INTERNAL MEDICINE

## 2020-03-05 PROCEDURE — 83605 ASSAY OF LACTIC ACID: CPT | Performed by: FAMILY MEDICINE

## 2020-03-05 PROCEDURE — 87040 BLOOD CULTURE FOR BACTERIA: CPT | Performed by: FAMILY MEDICINE

## 2020-03-05 PROCEDURE — 86612 BLASTOMYCES ANTIBODY: CPT | Performed by: INTERNAL MEDICINE

## 2020-03-05 PROCEDURE — 99207 ZZC OFFICE-HOSPITAL ADMIT: CPT | Performed by: FAMILY MEDICINE

## 2020-03-05 PROCEDURE — 99223 1ST HOSP IP/OBS HIGH 75: CPT | Performed by: INTERNAL MEDICINE

## 2020-03-05 PROCEDURE — 25500064 ZZH RX 255 OP 636: Performed by: RADIOLOGY

## 2020-03-05 PROCEDURE — 87389 HIV-1 AG W/HIV-1&-2 AB AG IA: CPT | Performed by: INTERNAL MEDICINE

## 2020-03-05 PROCEDURE — 12000000 ZZH R&B MED SURG/OB

## 2020-03-05 PROCEDURE — 86255 FLUORESCENT ANTIBODY SCREEN: CPT | Performed by: INTERNAL MEDICINE

## 2020-03-05 PROCEDURE — 86431 RHEUMATOID FACTOR QUANT: CPT | Performed by: INTERNAL MEDICINE

## 2020-03-05 PROCEDURE — 25000132 ZZH RX MED GY IP 250 OP 250 PS 637: Performed by: INTERNAL MEDICINE

## 2020-03-05 PROCEDURE — 86140 C-REACTIVE PROTEIN: CPT | Performed by: FAMILY MEDICINE

## 2020-03-05 PROCEDURE — 86606 ASPERGILLUS ANTIBODY: CPT | Performed by: INTERNAL MEDICINE

## 2020-03-05 PROCEDURE — 85025 COMPLETE CBC W/AUTO DIFF WBC: CPT | Performed by: FAMILY MEDICINE

## 2020-03-05 RX ORDER — ONDANSETRON 2 MG/ML
4 INJECTION INTRAMUSCULAR; INTRAVENOUS EVERY 6 HOURS PRN
Status: DISCONTINUED | OUTPATIENT
Start: 2020-03-05 | End: 2020-03-06 | Stop reason: HOSPADM

## 2020-03-05 RX ORDER — NALOXONE HYDROCHLORIDE 0.4 MG/ML
.1-.4 INJECTION, SOLUTION INTRAMUSCULAR; INTRAVENOUS; SUBCUTANEOUS
Status: DISCONTINUED | OUTPATIENT
Start: 2020-03-05 | End: 2020-03-06 | Stop reason: HOSPADM

## 2020-03-05 RX ORDER — BENZONATATE 100 MG/1
100 CAPSULE ORAL 3 TIMES DAILY PRN
Status: DISCONTINUED | OUTPATIENT
Start: 2020-03-05 | End: 2020-03-06 | Stop reason: HOSPADM

## 2020-03-05 RX ORDER — ONDANSETRON 4 MG/1
4 TABLET, ORALLY DISINTEGRATING ORAL EVERY 6 HOURS PRN
Status: DISCONTINUED | OUTPATIENT
Start: 2020-03-05 | End: 2020-03-06 | Stop reason: HOSPADM

## 2020-03-05 RX ORDER — LEVOTHYROXINE SODIUM 112 UG/1
112 TABLET ORAL DAILY
Status: DISCONTINUED | OUTPATIENT
Start: 2020-03-06 | End: 2020-03-06 | Stop reason: HOSPADM

## 2020-03-05 RX ORDER — ITRACONAZOLE 100 MG/1
200 CAPSULE ORAL EVERY 12 HOURS
Status: DISCONTINUED | OUTPATIENT
Start: 2020-03-05 | End: 2020-03-06 | Stop reason: HOSPADM

## 2020-03-05 RX ORDER — LISINOPRIL 10 MG/1
10 TABLET ORAL DAILY
Status: DISCONTINUED | OUTPATIENT
Start: 2020-03-06 | End: 2020-03-06 | Stop reason: HOSPADM

## 2020-03-05 RX ORDER — ACETAMINOPHEN 325 MG/1
650 TABLET ORAL EVERY 4 HOURS PRN
Status: DISCONTINUED | OUTPATIENT
Start: 2020-03-05 | End: 2020-03-06 | Stop reason: HOSPADM

## 2020-03-05 RX ORDER — IOPAMIDOL 612 MG/ML
100 INJECTION, SOLUTION INTRAVASCULAR ONCE
Status: COMPLETED | OUTPATIENT
Start: 2020-03-05 | End: 2020-03-05

## 2020-03-05 RX ORDER — LIDOCAINE 40 MG/G
CREAM TOPICAL
Status: DISCONTINUED | OUTPATIENT
Start: 2020-03-05 | End: 2020-03-06 | Stop reason: HOSPADM

## 2020-03-05 RX ADMIN — ITRACONAZOLE 200 MG: 100 CAPSULE, COATED PELLETS ORAL at 17:30

## 2020-03-05 RX ADMIN — Medication 1 MG: at 20:12

## 2020-03-05 RX ADMIN — BENZONATATE 100 MG: 100 CAPSULE ORAL at 17:46

## 2020-03-05 RX ADMIN — IOPAMIDOL 100 ML: 612 INJECTION, SOLUTION INTRAVENOUS at 14:14

## 2020-03-05 RX ADMIN — ACETAMINOPHEN 650 MG: 325 TABLET, FILM COATED ORAL at 20:16

## 2020-03-05 ASSESSMENT — ACTIVITIES OF DAILY LIVING (ADL)
COGNITION: 0 - NO COGNITION ISSUES REPORTED
DRESS: 0-->INDEPENDENT
ADLS_ACUITY_SCORE: 10
TRANSFERRING: 0-->INDEPENDENT
ADLS_ACUITY_SCORE: 10
TOILETING: 0-->INDEPENDENT
SWALLOWING: 0-->SWALLOWS FOODS/LIQUIDS WITHOUT DIFFICULTY
AMBULATION: 0-->INDEPENDENT
RETIRED_EATING: 0-->INDEPENDENT
BATHING: 0-->INDEPENDENT
FALL_HISTORY_WITHIN_LAST_SIX_MONTHS: NO
RETIRED_COMMUNICATION: 0-->UNDERSTANDS/COMMUNICATES WITHOUT DIFFICULTY

## 2020-03-05 ASSESSMENT — MIFFLIN-ST. JEOR: SCORE: 2534.77

## 2020-03-05 ASSESSMENT — PAIN SCALES - GENERAL: PAINLEVEL: NO PAIN (0)

## 2020-03-05 NOTE — NURSING NOTE
"Chief Complaint   Patient presents with     URI       Initial /78 (BP Location: Left arm, Patient Position: Sitting, Cuff Size: Adult Large)   Pulse 127   Temp 101  F (38.3  C) (Tympanic)   Resp 20   Ht 1.778 m (5' 10\")   Wt (!) 157.9 kg (348 lb)   SpO2 92%   BMI 49.93 kg/m   Estimated body mass index is 49.93 kg/m  as calculated from the following:    Height as of this encounter: 1.778 m (5' 10\").    Weight as of this encounter: 157.9 kg (348 lb).  Medication Reconciliation: complete  Zoila Kenyon LPN  "

## 2020-03-05 NOTE — PLAN OF CARE
[facility] Inpatient Admission Note:    Patient admitted to 3222/3222-1 at approximately 1230 via wheelchair accompanied by mother and father from clinic . Report received from not received. Patient transferred to bed via self.. Patient is alert and oriented X 3, denies pain; rates at 0 on 0-10 scale.  Patient oriented to room, unit, hourly rounding, and plan of care. Explained admission packet and patient handbook with patient bill of rights brochure. Will continue to monitor and document as needed.     Inpatient Nursing criteria listed below was met:    Health care directives status obtained and documented: Yes    Care Everywhere authorization obtained No    MRSA swab completed for patient 65 years and older: N/A    Patient identifies a surrogate decision maker: Yes If yes, who: Daysi (mother)      If initial lactic acid >2.0, repeat lactic acid drawn within one hour of arrival to unit: No. If no, state reason: 1.1    Vaccination assessment and education completed: Yes   Vaccinations received prior to admission: Pneumovax no  Influenza(seasonal)  NO   Vaccination(s) ordered: not given today because pt declined    Clergy visit ordered if patient requests: No    Skin issues/needs documented: No    Isolation Patient: no Education given, correct sign in place and documentation row added to PCS:  NA    Fall Prevention Yes: Care plan updated, education given and documented, sticker and magnet in place: Yes    Care Plan initiated: Yes    Education Documented (including assessment): Yes    Patient has discharge needs : No If yes, please explain:

## 2020-03-05 NOTE — H&P
[facility]    History and Physical  Hospitalist       Date of Admission:  3/5/2020  Date of Service (when I saw the patient): 03/05/20    Assessment & Plan   Rhett Moore is a 34 year old male who presents with sob.    RLL infiltrate: refractory over the last several weeks to abx treatment, including ceftriaxone, azithromax, and augmentin.  Since it has been refractory to standard antibiotic treatment, suspect possibly fungal versus inflammatory condition.  -blood cultures  -empiric levofloxacin if he worsens  -CT scan of chest  -procalcitonin  -sputum cultures including fungal cultures  -will test him for HIV   -fungal titers  -autoimmune titers (SIDRA, ANCA)  -O2 support as needed, wean as able    HTN: bp and HR wnl and stable.  - continue lisinopril    Hypothyroidism: continue synthroid.    FEN: oral diet as tolerated    DVT Prophylaxis: Low Risk/Ambulatory with no VTE prophylaxis indicated    Code Status: Full Code    Disposition: Expected discharge in 1-2 days once improved.    Kenrick Garcia MD    Primary Care Physician   Noelle Farrell      Chief Complaint   sob    History is obtained from the patient and his PCP.    History of Present Illness   Rhett Moore is a 34 year old male who presents with sob.  Per his PCP Dr. Farrell, the patient has had the following timeline:    2/8/20 - ER visit for 3 weeks respiratory symptoms - RLL on xray - treated with Rocephin and Zithromax.  2/12/2020 - clinic follow up - treated with augmentin  2/19/20 - repeat xray showed worsening RLL but clinically better - lungs clear  2/21/2020- afebrile, slow improvement, lungs clear -  Held course.  2/24/2020 - in for small blood in stool - labs normalizing; lungs clear; of note hgb was low  3/3/2020 - repeat xray only - 1 month follow up     The patient sees Dr. Farrell for follow-up, with really no improvement in his symptoms.  He has persistently febrile intermittently, and he is found to be mildly hypoxic at  89% on room air.  He has had a chronic cough, mostly nonproductive, but he states that it sounds wet at times.  In the hospital here he has been in the 90s.  He states a couple days ago that he had some hand and wrist pain and stiffness, which resolved after a few days.  It also affected his right ankle.  He lives in a basement with water heater, washer dryer, as well as furnace.  He states that he has not been moving old boxes around, nor has he gotten into any juana situations.  He is being admitted for further work-up.      Past Medical History    I have reviewed this patient's medical history and updated it with pertinent information if needed.   Past Medical History:   Diagnosis Date     Attention deficit disorder of childhood without me 02/16/2000     Chronic conjunctivitis of both eyes, unspecified chronic conjunctivitis type     Prednisone drops; [facility]     Hypertrichosis of left upper eyelid      Hypertrichosis of right upper eyelid     lashes trimmed     Unspecified acquired hypothyroidism 10/19/2006       Past Surgical History   I have reviewed this patient's surgical history and updated it with pertinent information if needed.  Past Surgical History:   Procedure Laterality Date     APPENDECTOMY       TUBES         Prior to Admission Medications   Prior to Admission Medications   Prescriptions Last Dose Informant Patient Reported? Taking?   acetaminophen 325 MG PO tablet  Self Yes No   Sig: Take 325-650 mg by mouth every 6 hours as needed for mild pain   guaiFENesin-codeine 100-10 MG/5ML PO solution  Self No No   Sig: Take 5-10 mLs by mouth every 4 hours as needed for cough   Patient not taking: Reported on 3/5/2020   levothyroxine (SYNTHROID/LEVOTHROID) 112 MCG tablet  Self No No   Sig: Take 1 tablet (112 mcg) by mouth daily   lisinopril (PRINIVIL/ZESTRIL) 10 MG tablet  Self No No   Sig: Take 1 tablet (10 mg) by mouth daily      Facility-Administered Medications: None     Allergies   No Known  Allergies    Social History   I have reviewed this patient's social history and updated it with pertinent information if needed. Rhett Moore  reports that he has never smoked. He has never used smokeless tobacco. He reports current alcohol use. He reports that he does not use drugs.    Family History   I have reviewed this patient's family history and updated it with pertinent information if needed.   Family History   Problem Relation Age of Onset     Cancer Mother      Cerebrovascular Disease Father    Sisters positive for Hashimoto's thyroiditis.      Review of Systems   The 10 point Review of Systems is negative other than noted in the HPI or here.    Physical Exam                      Vital Signs with Ranges  Temp:  [101  F (38.3  C)] 101  F (38.3  C)  Pulse:  [127] 127  Resp:  [20] 20  BP: (120)/(78) 120/78  SpO2:  [89 %-92 %] 92 %  0 lbs 0 oz    Constitutional: AA, NAD, obese  Eyes: PERRLA, no injection, no icterus  HEENT: atraumatic, normocephalic  Respiratory: Left lung is relatively clear, right lung has diminished breath sounds with faint crackles and wheezes throughout, in particular at the base.  Cardiovascular: S1 S2 RRR  GI: soft, NT, ND, + bowel sounds  Lymph/Hematologic: no palpable lymphadenopathy  Skin: no rashes, no lesions  Musculoskeletal: No edema, good tone, no deformities  Neurologic: oriented x 3, no focal deficits  Psychiatric: appropriate affect    Data   Data reviewed today:  I personally reviewed imaging reports.  Recent Labs   Lab 03/05/20  1109   WBC 18.2*   HGB 11.8*   MCV 86   *      POTASSIUM 3.9   CHLORIDE 100   CO2 25   BUN 8   CR 0.86   ANIONGAP 8   BENNETT 8.9   *   ALBUMIN 2.6*   PROTTOTAL 7.9   BILITOTAL 0.9   ALKPHOS 114   ALT 83*   AST 47*     Lactic Acid   Date Value Ref Range Status   02/08/2020 1.1 0.7 - 2.0 mmol/L Final       No results found for this or any previous visit (from the past 24 hour(s)).

## 2020-03-05 NOTE — PLAN OF CARE
Face to face report given with opportunity to observe patient.    Report given to Brittany Werner RN   3/5/2020  3:05 PM

## 2020-03-05 NOTE — PLAN OF CARE
Prior to Admission Medication Reconciliation:     Medications added:   [] None  [x] As listed below:    nyquil- as needed, patient reported helps him sleep    Probiotic- patient unsure of type or strength    Medications deleted:   [] None  [x] As listed below:    Robitussin ac- patient prescribed last month to help him with his cough and as a sleep aid and he stated it was ineffective so he quite taking it. Since it was a PRN with no refills I took it off the PTA med list    Changes made to existing medications:   [] None  [x] As listed below:    apap- added 4000 mg /day. Patient was taking a lot of apap for about a month and his doctor at the time advised him to stop taking it. Another provider told him today that he could resume taking it but not to exceed the recommended daily dosage. Patient has not been treating himself with any OTC analgesics for his fever over the last couple of days.     Last times/dates taken verified with patient:  [x] Yes- completed myself  [] Nurse completed no changes made  [] Unable to review with patient:  [] Nurse completed/changes made:     Allergy modifications:    [x]Did not review  []Patient verified NKA  []Patient verified current existing allergies: no changes made  []New allergies: listed below    Medication reconciliation sources:   [x]Patient  [x]Patient family member/emergency contact: patient's mom  [][facility] Report Review  []Epic Chart Review  []Care Everywhere review  []Pharmacy med list: **  []Pharmacy phone call  [x]Outside meds dispense report:  Medication Dispense History (from 3/6/2019 to 3/3/2020)   Expand All  Collapse All   Amoxicillin-Pot Clavulanate     Dispensed Days Supply Quantity Provider Pharmacy   AMOX/K CLAV  -125 02/12/2020 7 14 each  [facility]...         Azithromycin     Dispensed Days Supply Quantity Provider Pharmacy   AZITHROMYCIN TAB 500MG 02/08/2020 5 5 each  [id number] ...         Levothyroxine Sodium      Dispensed Days Supply Quantity Provider Pharmacy   LEVOTHYROXIN TAB 112MCG 01/02/2020 90 90 [id number] ...   LEVOTHYROXIN TAB 112MCG 12/10/2019 30 30 [id number] ...   LEVOTHYROXINE 112 MCG TABLET 11/13/2019 30 30 each [id number] ...   LEVOTHYROXINE 100 MCG TABLET 11/04/2019 90 90 [id number] ...   LEVOTHYROXINE 100 MCG TABLET 10/07/2019 30 30 [id number] ...   LEVOTHYROXINE 100 MCG TABLET 09/09/2019 30 30 [id number] ...   LEVOTHYROXINE 88 MCG TABLET 08/22/2019 90 90 each [id number] ...   Levothyroxine Sodium Tablet 75 Mcg 06/20/2019 30 30 each [facility] [place]   Levothyroxine Sodium Tablet 75 Mcg 05/20/2019 30 30 each [facility] [place]   Levothyroxine Sodium Tablet 50 Mcg 05/14/2019 30 30 each [facility] [place],...   Levothyroxine Sodium Tablet 50 Mcg 04/11/2019 30 30 each [facility] [place],...         Lisinopril     Dispensed Days Supply Quantity Provider Pharmacy   LISINOPRIL   TAB 10MG 02/20/2020 90 90 [id number] ...   LISINOPRIL 10 MG TABLET 11/21/2019 90 90 each [id number] ...   LISINOPRIL 10 MG TABLET 08/22/2019 90 90 each [id number] ...   Lisinopril Tablet 10 Mg 06/26/2019 30 30 each [facility] [place],...   Lisinopril Tablet 10 Mg 05/20/2019 30 30 each [facility] [place],...         guaiFENesin-Codeine     Dispensed Days Supply Quantity Provider Pharmacy   GG/CODEINE   -10/5 02/19/2020 3 180 [facility]         levoFLOXacin     Dispensed Days Supply Quantity Provider Pharmacy   LEVOFLOXACIN TAB 750MG 02/19/2020 5 5 [facility]...         prednisoLONE Acetate      Dispensed Days Supply Quantity Provider Pharmacy   Pred Mild 0.12% Eye Drops 04/22/2019 15 5 [facility] [place]         []Nursing home MAR:  []Other: **    Is patient on coumadin?  [x]No:    Requests for consultation by provider or pharmacist:   [x] Patient understands why all of their meds were prescribed and how to take them. No questions.   [x] Fill dates coincide with compliancy for all maintenance meds.   [] Fill dates do not coincide with compliancy with maintenance meds. See notes in PTA medlist about how patient is taking.   [] Patient has questions about the following:      Comments: patient alert and oriented. No concerns.     Kelsey Radhapadmini on 3/5/2020 at 12:41 PM       Discrepancies: [x] No []Not Applicable []Yes: listed below    Time spent on medication reconciliation:   [x]5-20 mins  []20-40 mins  []> 40 mins    Issues completing PTA medication reconciliation:  [] On hold for a long time  [] Waited for a call back  [] Fax didn't come through  [] Fax took a long time  [] Other:    Notifying appropriate party of changes/additions/discrepancies:  []No changes made, notification not necessary.   [] Notified attending provider via text page  [] Notified attending provider in person  [] Notified pharmacy  [] Notified nurse  [] Attending provider not available, left detailed notes  [x] Changes/additions made don't need provider notification because provider has not seen patient or input any orders  [] Changes/additions made don't need provider notification because changes made are to medications not ordered

## 2020-03-05 NOTE — PROGRESS NOTES
Subjective     Rhett Moore is a 34 year old male who presents to clinic today for the following health issues:    HPI   RESPIRATORY SYMPTOMS  Ongoing cough/pneumonia      Duration: 6 weeks    Description  cough, fever, chills, fatigue/malaise, diarrheaoccasionally    Severity: mild    Accompanying signs and symptoms: vomiting and no appetite, but taking in fluids    History (predisposing factors):  Yes follow up     Precipitating or alleviating factors: None    Therapies tried and outcome:  rest and fluids guaifenesin and antibiotics    Cough still persistent, somewhat productive, yellowish    Fever for past few days, 101.8F highest    No OTC medications, antipyretics    Pain in hands and feet, 3 days, resolved    No abnormal blood loss    Lives in basement - possible mold exposure?    Has vomited twice - last time was last night after eating    Mom present with patient today.  Did go back to work for a few days.    2/8/20 - ER visit for 3 weeks respiratory symptoms - RLL on xray - treated with Rocephin and Zithromax.  2/12/2020 - clinic follow up - treated with augmentin  2/19/20 - repeat xray showed worsening RLL but clinically better - lungs clear  2/21/2020- afebrile, slow improvement, lungs clear -  Held course.  2/24/2020 - in for small blood in stool - labs normalizing; lungs clear; of note hgb was low  3/3/2020 - repeat xray only - 1 month follow up - same      Lab Results   Component Value Date    WBC 18.2 (H) 03/05/2020    WBC 14.3 (H) 02/24/2020    WBC 16.9 (H) 02/21/2020    HGB 11.8 (L) 03/05/2020    HGB 12.4 (L) 02/24/2020    HGB 12.8 (L) 02/21/2020    HCT 37.5 (L) 03/05/2020    HCT 39.6 (L) 02/24/2020    HCT 40.8 02/21/2020     (H) 03/05/2020     02/24/2020     (H) 02/21/2020     03/05/2020     02/08/2020     06/20/2019    POTASSIUM 3.9 03/05/2020    POTASSIUM 3.7 02/08/2020    POTASSIUM 4.5 06/20/2019    CHLORIDE 100 03/05/2020    CHLORIDE 102 02/08/2020     CHLORIDE 107 06/20/2019    CO2 25 03/05/2020    CO2 24 02/08/2020    CO2 27 06/20/2019    BUN 8 03/05/2020    BUN 13 02/08/2020    BUN 13 06/20/2019    CR 0.86 03/05/2020    CR 1.06 02/08/2020    CR 1.03 06/20/2019     (H) 03/05/2020    GLC 99 02/08/2020    GLC 86 06/20/2019    AST 47 (H) 03/05/2020    AST 29 04/11/2019    ALT 83 (H) 03/05/2020    ALT 92 (H) 04/11/2019    ALKPHOS 114 03/05/2020    ALKPHOS 56 04/11/2019    BILITOTAL 0.9 03/05/2020    BILITOTAL 0.7 04/11/2019     CRP Inflammation   Date Value Ref Range Status   03/05/2020 158.0 (H) 0.0 - 8.0 mg/L Final           Current Outpatient Medications   Medication     acetaminophen 325 MG PO tablet     levothyroxine (SYNTHROID/LEVOTHROID) 112 MCG tablet     lisinopril (PRINIVIL/ZESTRIL) 10 MG tablet     guaiFENesin-codeine 100-10 MG/5ML PO solution     No current facility-administered medications for this visit.        Patient Active Problem List   Diagnosis     Morbid obesity (H)     Hypothyroidism, unspecified type     Essential hypertension     Pneumonia due to drug resistant Streptococcus pneumoniae (H)     Pneumonia of lower lobe due to infectious organism, unspecified laterality (H)     Past Surgical History:   Procedure Laterality Date     APPENDECTOMY       TUBES         Social History     Tobacco Use     Smoking status: Never Smoker     Smokeless tobacco: Never Used   Substance Use Topics     Alcohol use: Yes     Family History   Problem Relation Age of Onset     Cancer Mother      Cerebrovascular Disease Father            Reviewed and updated as needed this visit by Provider         Review of Systems   ROS COMP: Constitutional, HEENT, cardiovascular, pulmonary, GI, , musculoskeletal, neuro, skin, endocrine and psych systems are negative, except as otherwise noted.      Objective    /78 (BP Location: Left arm, Patient Position: Sitting, Cuff Size: Adult Large)   Pulse 127   Temp 101  F (38.3  C) (Tympanic)   Resp 20   Ht 1.778 m (5'  "10\")   Wt (!) 157.9 kg (348 lb)   SpO2 92%   BMI 49.93 kg/m    Body mass index is 49.93 kg/m .  Physical Exam   GENERAL: alert, no distress and obese; appears pale  EYES: Eyes grossly normal to inspection, PERRL and conjunctivae and sclerae normal  HENT: ear canals and TM's normal, nose and mouth without ulcers or lesions  NECK: no adenopathy, no asymmetry, masses, or scars and thyroid normal to palpation  RESP: lungs clear to auscultation - no rales, rhonchi or wheezes and decreased breath sounds right base  CV: regular rate and rhythm, normal S1 S2, no S3 or S4, no murmur, click or rub, no peripheral edema and peripheral pulses strong  ABDOMEN: soft, nontender, no hepatosplenomegaly, no masses and bowel sounds normal  MS: no gross musculoskeletal defects noted, no edema  SKIN: no suspicious lesions or rashes  PSYCH: mentation appears normal, affect normal/bright    Diagnostic Test Results:  Labs reviewed in Epic  Results for orders placed or performed in visit on 03/05/20 (from the past 24 hour(s))   CBC with platelets and differential   Result Value Ref Range    WBC 18.2 (H) 4.0 - 11.0 10e9/L    RBC Count 4.35 (L) 4.4 - 5.9 10e12/L    Hemoglobin 11.8 (L) 13.3 - 17.7 g/dL    Hematocrit 37.5 (L) 40.0 - 53.0 %    MCV 86 78 - 100 fl    MCH 27.1 26.5 - 33.0 pg    MCHC 31.5 31.5 - 36.5 g/dL    RDW 13.7 10.0 - 15.0 %    Platelet Count 478 (H) 150 - 450 10e9/L    Diff Method Automated Method     % Neutrophils 78.2 %    % Lymphocytes 10.4 %    % Monocytes 8.1 %    % Eosinophils 0.6 %    % Basophils 0.6 %    % Immature Granulocytes 2.1 %    Nucleated RBCs 0 0 /100    Absolute Neutrophil 14.2 (H) 1.6 - 8.3 10e9/L    Absolute Lymphocytes 1.9 0.8 - 5.3 10e9/L    Absolute Monocytes 1.5 (H) 0.0 - 1.3 10e9/L    Absolute Eosinophils 0.1 0.0 - 0.7 10e9/L    Absolute Basophils 0.1 0.0 - 0.2 10e9/L    Abs Immature Granulocytes 0.4 0 - 0.4 10e9/L    Absolute Nucleated RBC 0.0    CRP inflammation   Result Value Ref Range    CRP " Inflammation 158.0 (H) 0.0 - 8.0 mg/L   Lactate Dehydrogenase   Result Value Ref Range    Lactate Dehydrogenase 176 85 - 227 U/L   Comprehensive metabolic panel   Result Value Ref Range    Sodium 133 133 - 144 mmol/L    Potassium 3.9 3.4 - 5.3 mmol/L    Chloride 100 94 - 109 mmol/L    Carbon Dioxide 25 20 - 32 mmol/L    Anion Gap 8 3 - 14 mmol/L    Glucose 102 (H) 70 - 99 mg/dL    Urea Nitrogen 8 7 - 30 mg/dL    Creatinine 0.86 0.66 - 1.25 mg/dL    GFR Estimate >90 >60 mL/min/[1.73_m2]    GFR Estimate If Black >90 >60 mL/min/[1.73_m2]    Calcium 8.9 8.5 - 10.1 mg/dL    Bilirubin Total 0.9 0.2 - 1.3 mg/dL    Albumin 2.6 (L) 3.4 - 5.0 g/dL    Protein Total 7.9 6.8 - 8.8 g/dL    Alkaline Phosphatase 114 40 - 150 U/L    ALT 83 (H) 0 - 70 U/L    AST 47 (H) 0 - 45 U/L           Assessment & Plan       ICD-10-CM    1. Pneumonia of right lower lobe due to infectious organism (H) J18.1 CBC with platelets and differential     CRP inflammation     Lactate Dehydrogenase     Comprehensive metabolic panel     Blood culture   2. Fever, unspecified fever cause R50.9 CBC with platelets and differential     CRP inflammation     Lactate Dehydrogenase     Comprehensive metabolic panel     Blood culture   3. Abnormal chest x-ray R93.89 CBC with platelets and differential     CRP inflammation     Lactate Dehydrogenase     Comprehensive metabolic panel     Blood culture   4. Hypoxia R09.02 CBC with platelets and differential     CRP inflammation     Lactate Dehydrogenase     Comprehensive metabolic panel     Blood culture        Failed outpatient treatment for pneumonia.  Hypoxic 89%.    Still infectious (bacterail, fungal) vs inflammatory or other cause.    Needs lung CT.  Sputum cultures if able.  Case discussed with Dr. Gonzalez who accepts admission.    Patient Instructions   Admitted to hospital.      No follow-ups on file.    Noelle Shin MD  [facility]

## 2020-03-05 NOTE — TELEPHONE ENCOUNTER
Mother calling and do not see consent to discuss information. She states he is not doing well.  Patient on phone and states he still has a really bad cough and fever. Last night fever was 101.8. Not coughing anything up. He threw up his dinner last night. No SOB, no difficulty breathing.He is no longer on ABX. He feels like he is not getting better and has no energy. Will schedule.    Cristiane Dove RN

## 2020-03-06 VITALS
RESPIRATION RATE: 22 BRPM | WEIGHT: 315 LBS | DIASTOLIC BLOOD PRESSURE: 89 MMHG | OXYGEN SATURATION: 92 % | BODY MASS INDEX: 46.22 KG/M2 | HEART RATE: 114 BPM | SYSTOLIC BLOOD PRESSURE: 153 MMHG | TEMPERATURE: 99.3 F

## 2020-03-06 LAB
CRYPTOC AG SPEC QL: NORMAL
ERYTHROCYTE [DISTWIDTH] IN BLOOD BY AUTOMATED COUNT: 13.8 % (ref 10–15)
HCT VFR BLD AUTO: 34.5 % (ref 40–53)
HGB BLD-MCNC: 11 G/DL (ref 13.3–17.7)
MCH RBC QN AUTO: 27.5 PG (ref 26.5–33)
MCHC RBC AUTO-ENTMCNC: 31.9 G/DL (ref 31.5–36.5)
MCV RBC AUTO: 86 FL (ref 78–100)
PLATELET # BLD AUTO: 390 10E9/L (ref 150–450)
RBC # BLD AUTO: 4 10E12/L (ref 4.4–5.9)
SPECIMEN SOURCE: NORMAL
WBC # BLD AUTO: 14.9 10E9/L (ref 4–11)

## 2020-03-06 PROCEDURE — 25000132 ZZH RX MED GY IP 250 OP 250 PS 637: Performed by: INTERNAL MEDICINE

## 2020-03-06 PROCEDURE — 36415 COLL VENOUS BLD VENIPUNCTURE: CPT | Performed by: INTERNAL MEDICINE

## 2020-03-06 PROCEDURE — 99239 HOSP IP/OBS DSCHRG MGMT >30: CPT | Performed by: INTERNAL MEDICINE

## 2020-03-06 PROCEDURE — 85027 COMPLETE CBC AUTOMATED: CPT | Performed by: INTERNAL MEDICINE

## 2020-03-06 RX ORDER — ITRACONAZOLE 100 MG/1
CAPSULE ORAL
Qty: 738 CAPSULE | Refills: 0 | Status: SHIPPED | OUTPATIENT
Start: 2020-03-06 | End: 2020-09-05

## 2020-03-06 RX ORDER — BENZONATATE 100 MG/1
100 CAPSULE ORAL 3 TIMES DAILY PRN
Qty: 42 CAPSULE | Refills: 0 | Status: SHIPPED | OUTPATIENT
Start: 2020-03-06 | End: 2021-09-01

## 2020-03-06 RX ORDER — LEVOTHYROXINE SODIUM 112 UG/1
112 TABLET ORAL DAILY
Qty: 90 TABLET | Refills: 0 | Status: SHIPPED | OUTPATIENT
Start: 2020-03-06 | End: 2020-08-18

## 2020-03-06 RX ADMIN — LEVOTHYROXINE SODIUM 112 MCG: 0.11 TABLET ORAL at 05:40

## 2020-03-06 RX ADMIN — ITRACONAZOLE 200 MG: 100 CAPSULE, COATED PELLETS ORAL at 05:40

## 2020-03-06 RX ADMIN — LISINOPRIL 10 MG: 10 TABLET ORAL at 09:22

## 2020-03-06 RX ADMIN — ACETAMINOPHEN 650 MG: 325 TABLET, FILM COATED ORAL at 05:40

## 2020-03-06 ASSESSMENT — ACTIVITIES OF DAILY LIVING (ADL)
ADLS_ACUITY_SCORE: 10

## 2020-03-06 NOTE — PLAN OF CARE
Patient discharged at 11:20 AM via ambulation accompanied by father and staff. Prescriptions sent to patients preferred pharmacy. All belongings sent with patient.     Discharge instructions reviewed with pt and father. Listed belongings gathered and returned to patient. Yes    Discharging home.    Core Measures and Vaccines  Core Measures applicable during stay: Yes. If yes, state diagnosis: Pneu  Pneumonia and Influenza given prior to discharge, if indicated: Refused.     Surgical Patient   Surgical Procedures during stay: no    MISC  Follow up appointment made:  Yes  Home and hospital aquired medications returned to patient: N/A  Patient reports pain was well managed at discharge: Yes

## 2020-03-06 NOTE — DISCHARGE SUMMARY
[facility]    Discharge Summary  Hospitalist    Date of Admission:  3/5/2020  Date of Discharge:  3/6/2020  Discharging Provider: Kenrick Garcia MD  Date of Service (when I saw the patient): 03/06/20    Discharge Diagnoses   Active Problems:    Atypical CAP (community acquired pneumonia), suspected blastomycosis (3/5/2020)    HTN    Hypothyroidism      History of Present Illness   Rhett Moore is an 34 year old male who presented with sob, fever.  Please see admission H+P for additional details.    Hospital Course   Rhett Moore was admitted on 3/5/2020.  34-year-old male who has been battling upper respiratory and pneumonialike symptoms for the past 6 weeks.  He has been refractory to several rounds of antibiotics including ceftriaxone, azithromycin, and Augmentin.  He presented to his primary care physician with no improvement, so he was admitted to the hospital for work-up.  CT scan of his chest showed large right-sided pneumonia with multiple lung nodules throughout lung fields, raising the suspicion for possible and invasive fungal infection.  Blastomycosis is most likely as it is endemic to this area, however cannot rule out histoplasmosis, aspergillosis.  In addition, with autoimmunity in the family (his sister has Hashimoto's), we cannot rule out an inflammatory process.  Titers were sent for fungal serologies, as well as autoimmune work-up with antinuclear antibodies as well as ANCA.  Testing will take several days to return, so the patient was started empirically on Sporanox, and overnight patient already states that he is feeling better.  He never required supplemental oxygen while here, and he is ambulating around without difficulty.  At this point we will let him go home and follow-up with Dr. Farrell sometime next week, and hopefully his blood tests will be available at that time.  His Sporanox treatment will be 6 months in duration.  In the event that testing for fungal etiologies are  negative, he will need biopsy, possible bronchoscopy if the definitive diagnosis cannot be established.  He is however currently clinically stable to be discharged home.    Kenrick Garcia MD      Significant Results and Procedures   See below    Pending Results   These results will be followed up by Noelle Farrell    Unresulted Labs Ordered in the Past 30 Days of this Admission     Date and Time Order Name Status Description    3/5/2020 1816 Wound Culture Aerobic Bacterial Preliminary     3/5/2020 1407 HIV Antigen Antibody Combo In process     3/5/2020 1350 Fungus culture blood In process     3/5/2020 1338 Blood culture Preliminary     3/5/2020 1338 Blood culture Preliminary     3/5/2020 1338 Cryptococcus antigen In process     3/5/2020 1338 Coccidioides antibody In process     3/5/2020 1338 Aspergillus antibody In process     3/5/2020 1338 Histone antibody IgG In process     3/5/2020 1338 Blastomyces antibody ID In process     3/5/2020 1338 Rheumatoid factor In process     3/5/2020 1338 ANCA IgG by IFA with Reflex to Titer In process     3/5/2020 1338 Anti Nuclear Erin IgG by IFA with Reflex In process     3/5/2020 1035 BLOOD CULTURE Preliminary           Code Status   Full Code       Primary Care Physician   Noelle Farrell    Physical Exam   Temp: 99.3  F (37.4  C) Temp src: Tympanic BP: 153/89 Pulse: 114   Resp: 22 SpO2: 92 % O2 Device: None (Room air)    Vitals:    03/05/20 1300   Weight: 146.1 kg (322 lb 1.5 oz)     Vital Signs with Ranges  Temp:  [99.3  F (37.4  C)-101.8  F (38.8  C)] 99.3  F (37.4  C)  Pulse:  [] 114  Resp:  [18-22] 22  BP: (120-153)/(52-89) 153/89  SpO2:  [89 %-92 %] 92 %  I/O last 3 completed shifts:  In: 600 [P.O.:600]  Out: -     Constitutional: AA, NAD  Eyes: PERRLA, no injection, no icterus  HEENT: atraumatic, normocephalic  Respiratory: reduced breath sounds on right side compared to left, fine crackles heard on the right, clear on the left  Cardiovascular: S1 S2 RRR  GI:  soft, NT, ND, + bowel sounds  Lymph/Hematologic: no palpable lymphadenopathy  Skin: no rashes, no lesions  Musculoskeletal: No edema, good tone, no deformities  Neurologic: oriented x 3, no focal deficits  Psychiatric: appropriate affect    Discharge Disposition   Discharged to home  Condition at discharge: Stable    Consultations This Hospital Stay   None    Time Spent on this Encounter   Kenrick KEENAN MD, personally saw the patient today and spent greater than 30 minutes discharging this patient.    Discharge Orders      Reason for your hospital stay    pneumonia     Follow-up and recommended labs and tests     Follow up with primary care provider, Noelle Shin, within 7 days for hospital follow- up.  No follow up labs or test are needed.     Activity    Your activity upon discharge: activity as tolerated     Full Code     Diet    Follow this diet upon discharge: Orders Placed This Encounter      Combination Diet Regular Diet Adult     Discharge Medications   Current Discharge Medication List      START taking these medications    Details   benzonatate (TESSALON) 100 MG capsule Take 1 capsule (100 mg) by mouth 3 times daily as needed for cough  Qty: 42 capsule, Refills: 0    Associated Diagnoses: Pneumonia of lower lobe due to infectious organism, unspecified laterality (H)      itraconazole (SPORANOX) 100 MG capsule Take 2 capsules (200 mg) by mouth 3 times daily for 3 days, THEN 2 capsules (200 mg) 2 times daily.  Qty: 738 capsule, Refills: 0    Associated Diagnoses: Pneumonia of lower lobe due to infectious organism, unspecified laterality (H)         CONTINUE these medications which have NOT CHANGED    Details   acetaminophen 325 MG PO tablet Take 325-650 mg by mouth every 6 hours as needed for mild pain (Do not exceed 4000 mg of acetaminophen per day.)      DM-Doxylamine-Acetaminophen (NYQUIL COLD & FLU PO) Take 30 mLs by mouth nightly as needed      levothyroxine (SYNTHROID/LEVOTHROID) 112 MCG  tablet Take 1 tablet (112 mcg) by mouth daily  Qty: 90 tablet, Refills: 0    Associated Diagnoses: Hypothyroidism, unspecified type      lisinopril (PRINIVIL/ZESTRIL) 10 MG tablet Take 1 tablet (10 mg) by mouth daily  Qty: 90 tablet, Refills: 2    Associated Diagnoses: Essential hypertension      Probiotic Product (PROBIOTIC DAILY PO) Take 1 capsule by mouth daily           Allergies   No Known Allergies  Data   Most Recent 3 CBC's:  Recent Labs   Lab Test 03/06/20  0544 03/05/20  1109 02/24/20  1500   WBC 14.9* 18.2* 14.3*   HGB 11.0* 11.8* 12.4*   MCV 86 86 88    478* 438      Most Recent 3 BMP's:  Recent Labs   Lab Test 03/05/20  1109 02/08/20  1638 06/20/19  0920    134 140   POTASSIUM 3.9 3.7 4.5   CHLORIDE 100 102 107   CO2 25 24 27   BUN 8 13 13   CR 0.86 1.06 1.03   ANIONGAP 8 8 6   BENNETT 8.9 9.1 9.1   * 99 86     Most Recent 2 LFT's:  Recent Labs   Lab Test 03/05/20  1109 04/11/19  0848   AST 47* 29   ALT 83* 92*   ALKPHOS 114 56   BILITOTAL 0.9 0.7     Most Recent INR's and Anticoagulation Dosing History:  Anticoagulation Dose History     There is no flowsheet data to display.        Most Recent 3 Troponin's:No lab results found.  Most Recent Cholesterol Panel:  Recent Labs   Lab Test 04/11/19  0848   CHOL 223*   *   HDL 39*   TRIG 176*     Most Recent 6 Bacteria Isolates From Any Culture (See EPIC Reports for Culture Details):  Recent Labs   Lab Test 03/05/20  1817 03/05/20  1455 03/05/20  1406 03/05/20  1109 02/08/20  1626   CULT Culture in progress No growth after 15 hours No growth after 16 hours No growth after 19 hours No beta hemolytic Streptococcus Group A isolated     Most Recent TSH, T4 and A1c Labs:  Recent Labs   Lab Test 11/12/19  1021   TSH 14.73*   T4 0.83     Results for orders placed or performed during the hospital encounter of 03/05/20   CT Chest w Contrast    Narrative    PROCEDURE: CT CHEST W CONTRAST 3/5/2020 2:24 PM    HISTORY: Pneumonia, unresolved or  complicated; Shortness of breath    COMPARISONS: Plain films dated 3/3/2020.    Meds/Dose Given: Isovue 300, 100ml    TECHNIQUE: Axial postcontrast enhanced images with coronal and  sagittal reformatted images.    FINDINGS: There are mildly enlarged peritracheal lymph nodes. No  enlarged axillary lymph nodes are seen.    There are multiple bilateral pulmonary nodules ranging in size from a  few millimeters to about 12 mm. This is most consistent with pulmonary  metastatic disease.    There is focal consolidation in the right lower lobe. Air bronchograms  are seen. This is probably due to pneumonia. Central mass with  postobstructive change is also possible.    No abnormality is seen in the visualized liver or spleen. No  abnormality is seen in the adrenal glands. There are gallstones.    No bone lesion is seen.         Impression    IMPRESSION:   1. Innumerable pulmonary nodules most consistent with pulmonary  metastatic disease.  2. Focal consolidation in the right lower lobe most consistent with  pneumonia. Recommend follow-up to resolution. Central mass with  postobstructive change is not excluded.    UGO GARCIA MD

## 2020-03-06 NOTE — PLAN OF CARE
Reason for hospital stay:  blastomycosis    Most recent vitals: /76   Pulse 116   Temp 101.3  F (38.5  C) (Tympanic)   Resp 19   Wt 146.1 kg (322 lb 1.5 oz)   SpO2 92%   BMI 46.22 kg/m    Pain Management:  denies pain  Orientation:  wnl  Cardiac:  tachy 100-110s  Respiratory:  denies SOB, sats 93-94% RA lungs clear t/o reports scant phlegm from productive cough tessalon pinky given  GI:  bowel sounds active denies n/v  :  voids without difficulty  Diet:regular  Skin Issues:  denies  IVF:  sl  ABX:  none  Mobility:  independent gait steady  Safety:  alert orientated makes needs known uses call light approprietly    Comments:tylenol given for T 101.5 & melatonin given for sleep has no other complaints    3/5/2020  8:51 PM  AYESHA MESA RN

## 2020-03-06 NOTE — PROGRESS NOTES
Medical record and Frank Score reviewed. Participated in interdisciplinary rounds.  No apparent needs noted at this time. Care Transitions will remain available if needs arise.

## 2020-03-06 NOTE — PLAN OF CARE
Face to face report given with opportunity to observe patient.    Report given to AVIVA Akbar RN   3/5/2020  11:02 PM

## 2020-03-06 NOTE — PLAN OF CARE
A&O, independent, regular diet.  VS /52 HRR 95, RR 18 T (Max 101.8) O2 92% on RA, denies pain.  Noted pt to be hypoxic in high 80s at first, coughs and then sats adequately on room air. Tylenol x1 for fever at 0540.  Assessment as charted.  IV is SL.  Brakes locked, call light within reach, makes needs known.  Frequent rounding, free from falls.      Face to face report given with opportunity to observe patient.    Report given to AVIVA Daniels RN   3/6/2020  4:15 AM

## 2020-03-07 LAB — HIV 1+2 AB+HIV1 P24 AG SERPL QL IA: NONREACTIVE

## 2020-03-08 LAB
BACTERIA SPEC CULT: ABNORMAL
SPECIMEN SOURCE: ABNORMAL

## 2020-03-09 LAB
ANA SER QL IF: NEGATIVE
ANCA AB PATTERN SER IF-IMP: NORMAL
C-ANCA TITR SER IF: NORMAL {TITER}
COCCIDIOIDES AB SPEC QL ID: NORMAL
RHEUMATOID FACT SER NEPH-ACNC: 10 IU/ML (ref 0–20)

## 2020-03-10 ENCOUNTER — TELEPHONE (OUTPATIENT)
Dept: FAMILY MEDICINE | Facility: OTHER | Age: 35
End: 2020-03-10

## 2020-03-10 DIAGNOSIS — B40.9 BLASTOMYCOSIS: Primary | ICD-10-CM

## 2020-03-10 NOTE — TELEPHONE ENCOUNTER
Patients mother calling stating that they are still waiting on some results hospitalist ordered. States son has fungal pneumonia and states that they dont want to waste anymore time if he should be going somewhere else. Please see if you can read results and if there has been any conclusion to why or what he has going on as he is still coughing pretty bad and not feeling well. Thank ;you

## 2020-03-11 LAB
B DERMAT AB SER QL ID: DETECTED
BACTERIA SPEC CULT: NORMAL
Lab: NORMAL
Lab: NORMAL
SPECIMEN SOURCE: NORMAL

## 2020-03-11 NOTE — TELEPHONE ENCOUNTER
Called patient and mom and discussed positive blastomyces result.  He is taking the Sporonox regularly without noted side effect.  He is no longer having fevers.  He is coughing, with slow improvement.   Discussed pulmonary referral for duration of treatment, surveillance of imaging, additional recommendations.  They prefer him to be seen by [facility] pulmonology specifically.  Referral placed.  He will come for lab Sporanox level after being on medication for 2-3 weeks.   Appointment for clinic here on Friday cancelled.  Return to work as able.

## 2020-03-11 NOTE — TELEPHONE ENCOUNTER
Spoke with pt mom. Blasto result finalized. Only 2 lab results still processing. Please advise r/t that so I can call pt mom back r/t the results and confirmation of ref to [facility] wasn't sure exactly which to pend for you. Justine Elias LPN

## 2020-03-11 NOTE — TELEPHONE ENCOUNTER
Please relay my message below.  Labs should be back in next day or so.  If acutely worsening - needs to be seen.

## 2020-03-11 NOTE — TELEPHONE ENCOUNTER
Patients Mom called looking for lab results again today.  States she is very concerned about the patients health and would like to discuss a referral to the [facility] with the provider.  Please call her at [phone]

## 2020-03-11 NOTE — TELEPHONE ENCOUNTER
Patients mother called stating concern for son and wanted to know if they could get a referral to [facility] for pulmonary. Please advise

## 2020-03-11 NOTE — TELEPHONE ENCOUNTER
Additional labs still pending.  Specifically some of the fungal antibodies.  He is being treated for presumed Blastomycosis - and this is one of the labs that is still pending.  I did call lab and turn around time for these send out labs is approximately 7 days.  Should have results within 1-2 days.    If patient is clinically worsening, certainly should be re-evaluated.

## 2020-03-12 LAB
BACTERIA SPEC CULT: NO GROWTH
HISTONE IGG SER IA-ACNC: 0.3 UNITS (ref 0–0.9)
Lab: NORMAL
SPECIMEN SOURCE: NORMAL

## 2020-03-18 ENCOUNTER — TELEPHONE (OUTPATIENT)
Dept: FAMILY MEDICINE | Facility: OTHER | Age: 35
End: 2020-03-18

## 2020-03-18 PROBLEM — B40.2 PULMONARY BLASTOMYCOSIS (H): Status: ACTIVE | Noted: 2020-03-18

## 2020-03-18 NOTE — TELEPHONE ENCOUNTER
Spoke with pt r/t work form so it could be completed and faxed back. He was wondering with everything going on in the clinic if he should be coming in for his med level lab check as scheduled. Please advise.   Justine Elias LPN

## 2020-03-19 DIAGNOSIS — D64.9 ANEMIA, UNSPECIFIED TYPE: ICD-10-CM

## 2020-03-19 DIAGNOSIS — E03.9 HYPOTHYROIDISM, UNSPECIFIED TYPE: ICD-10-CM

## 2020-03-19 DIAGNOSIS — J18.9 PNEUMONIA OF LOWER LOBE DUE TO INFECTIOUS ORGANISM, UNSPECIFIED LATERALITY: ICD-10-CM

## 2020-03-19 LAB
BASOPHILS # BLD AUTO: 0.1 10E9/L (ref 0–0.2)
BASOPHILS NFR BLD AUTO: 0.9 %
CRP SERPL-MCNC: 53.2 MG/L (ref 0–8)
DIFFERENTIAL METHOD BLD: ABNORMAL
EOSINOPHIL # BLD AUTO: 0.4 10E9/L (ref 0–0.7)
EOSINOPHIL NFR BLD AUTO: 3.6 %
ERYTHROCYTE [DISTWIDTH] IN BLOOD BY AUTOMATED COUNT: 14.7 % (ref 10–15)
HCT VFR BLD AUTO: 37.8 % (ref 40–53)
HGB BLD-MCNC: 11.8 G/DL (ref 13.3–17.7)
IMM GRANULOCYTES # BLD: 0.2 10E9/L (ref 0–0.4)
IMM GRANULOCYTES NFR BLD: 1.9 %
LYMPHOCYTES # BLD AUTO: 2.4 10E9/L (ref 0.8–5.3)
LYMPHOCYTES NFR BLD AUTO: 20 %
MCH RBC QN AUTO: 26.5 PG (ref 26.5–33)
MCHC RBC AUTO-ENTMCNC: 31.2 G/DL (ref 31.5–36.5)
MCV RBC AUTO: 85 FL (ref 78–100)
MONOCYTES # BLD AUTO: 0.8 10E9/L (ref 0–1.3)
MONOCYTES NFR BLD AUTO: 6.7 %
NEUTROPHILS # BLD AUTO: 8.2 10E9/L (ref 1.6–8.3)
NEUTROPHILS NFR BLD AUTO: 66.9 %
NRBC # BLD AUTO: 0 10*3/UL
NRBC BLD AUTO-RTO: 0 /100
PLATELET # BLD AUTO: 543 10E9/L (ref 150–450)
RBC # BLD AUTO: 4.45 10E12/L (ref 4.4–5.9)
T4 FREE SERPL-MCNC: 1.08 NG/DL (ref 0.76–1.46)
TSH SERPL DL<=0.005 MIU/L-ACNC: 8.25 MU/L (ref 0.4–4)
WBC # BLD AUTO: 12.2 10E9/L (ref 4–11)

## 2020-03-19 PROCEDURE — 85025 COMPLETE CBC W/AUTO DIFF WBC: CPT | Performed by: FAMILY MEDICINE

## 2020-03-19 PROCEDURE — 84443 ASSAY THYROID STIM HORMONE: CPT | Performed by: FAMILY MEDICINE

## 2020-03-19 PROCEDURE — 86140 C-REACTIVE PROTEIN: CPT | Performed by: FAMILY MEDICINE

## 2020-03-19 PROCEDURE — 84439 ASSAY OF FREE THYROXINE: CPT | Performed by: FAMILY MEDICINE

## 2020-03-19 PROCEDURE — 36415 COLL VENOUS BLD VENIPUNCTURE: CPT | Performed by: FAMILY MEDICINE

## 2020-03-26 ENCOUNTER — TELEPHONE (OUTPATIENT)
Dept: FAMILY MEDICINE | Facility: OTHER | Age: 35
End: 2020-03-26

## 2020-03-26 ENCOUNTER — VIRTUAL VISIT (OUTPATIENT)
Dept: FAMILY MEDICINE | Facility: OTHER | Age: 35
End: 2020-03-26
Attending: FAMILY MEDICINE
Payer: COMMERCIAL

## 2020-03-26 VITALS — TEMPERATURE: 98.1 F | BODY MASS INDEX: 46.92 KG/M2 | WEIGHT: 315 LBS

## 2020-03-26 DIAGNOSIS — B40.2 PULMONARY BLASTOMYCOSIS (H): Primary | ICD-10-CM

## 2020-03-26 PROCEDURE — 99442 ZZC PHYSICIAN TELEPHONE EVALUATION 11-20 MIN: CPT | Mod: TEL | Performed by: FAMILY MEDICINE

## 2020-03-26 ASSESSMENT — PAIN SCALES - GENERAL: PAINLEVEL: NO PAIN (0)

## 2020-03-26 NOTE — NURSING NOTE
"Chief Complaint   Patient presents with     Cough       Initial Temp 98.1  F (36.7  C) (Tympanic)   Wt 148.3 kg (327 lb)   BMI 46.92 kg/m   Estimated body mass index is 46.92 kg/m  as calculated from the following:    Height as of 3/5/20: 1.778 m (5' 10\").    Weight as of this encounter: 148.3 kg (327 lb).  Medication Reconciliation: complete  Alem Quiñones LPN    "

## 2020-03-26 NOTE — TELEPHONE ENCOUNTER
Pt states he still has the bad cough so he was wondering if you wanted him going back to work. Also [facility] had cancelled his appt and he has not heard back from them yet at this point.

## 2020-03-26 NOTE — TELEPHONE ENCOUNTER
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

## 2020-03-26 NOTE — PROGRESS NOTES
"Rhett Moore is a 34 year old male who is being evaluated via a billable telephone visit.      The patient has been notified of following:     \"This telephone visit will be conducted via a call between you and your physician/provider. We have found that certain health care needs can be provided without the need for a physical exam.  This service lets us provide the care you need with a short phone conversation.  If a prescription is necessary we can send it directly to your pharmacy.  If lab work is needed we can place an order for that and you can then stop by our lab to have the test done at a later time.    If during the course of the call the physician/provider feels a telephone visit is not appropriate, you will not be charged for this service.\"     Rhett Moore complains of   Chief Complaint   Patient presents with     Cough       I have reviewed and updated the patient's Past Medical History, Social History, Family History and Medication List.    ALLERGIES  Patient has no known allergies.    Pulmonary blastomycocosis      Duration: symptoms for 2-3 months; hospitalized 3/5/2020-3/6/2020 and testing confirmed diagnoses then    Description (location/character/radiation): still having a bad cough, once in awhile coughs up yellow phlegm- [facility] consult was cancelled and has not heard back from them yet.     Accompanying signs and symptoms: See above    History (similar episodes/previous evaluation): None    Precipitating or alleviating factors: None    Therapies tried and outcome: Sporanox    Referral placed to pulmonology at [facility] 3/10/2020; cancelled all appointments due to COVID; doctors to review referrals and get back to them    Patient has been out of work    No fever in past week- checking daily    Appetite and energy improving    No new symptoms - denies chest pain, nausea, vomiting, changes in bowels, skin rash or other skin changes    Has not returned to work;     Had " labs done last week - but for some reason only thyroid labs were run - not the intraconazole level       Assessment/Plan:  1. Pulmonary blastomycosis (H)  Doing well overall.  Continue antifungal treatment.  Letter to return to to work without restrictions done.  Patient to stop in for lab tomorrow - itraconazole level and CMP (prior lab ordered, but not drawn? Was incorrectly entered as fluconazole anyway).  Goal >1 mcg/ml, <3.  Plan to recheck again in a month if needed if unable to be seen by pulmonology or infectious disease.  Current [facility] referral pending chart review and scheduling per patient.  Will notify us if he needs new referral.  Case discussed with [facility] pulmonologist via telephone as well - cough can continue for weeks to months; continue course and monitor level.  Patient notified he is not contagious - it is not spread person to person.    - Comprehensive metabolic panel (BMP + Alb, Alk Phos, ALT, AST, Total. Bili, TP); Future  - Itraconazole level     Phone call duration:  16 minutes    Noelle Shin MD

## 2020-03-26 NOTE — TELEPHONE ENCOUNTER
Again, was advised prior to go back to work if able.    If has additional questions, can schedule for telephone visit.

## 2020-03-26 NOTE — TELEPHONE ENCOUNTER
We are not writing work notes at this time for patients related to COVID 19.  Thank you.  I believe patient did seek care at [place] for his lung blastomycosis.  Did he have his visit?  If not, when scheduled?  If so, would like to get records.

## 2020-03-26 NOTE — LETTER
[facility] [place]  [address] [address]  Phone: [phone]    March 26, 2020      Re:  Rhett Moore  [address] [facility] [address]          To whom it may concern:    RE: Rhett PEDRAZA Teresa    Patient may return to work without restrictions 3/30/2020.    Please contact me for questions or concerns.      Sincerely,        Noelle Shin MD

## 2020-03-27 DIAGNOSIS — B40.9 BLASTOMYCOSIS: ICD-10-CM

## 2020-03-27 DIAGNOSIS — B40.2 PULMONARY BLASTOMYCOSIS (H): ICD-10-CM

## 2020-03-27 LAB
ALBUMIN SERPL-MCNC: 3.3 G/DL (ref 3.4–5)
ALP SERPL-CCNC: 76 U/L (ref 40–150)
ALT SERPL W P-5'-P-CCNC: 37 U/L (ref 0–70)
ANION GAP SERPL CALCULATED.3IONS-SCNC: 6 MMOL/L (ref 3–14)
AST SERPL W P-5'-P-CCNC: 23 U/L (ref 0–45)
BILIRUB SERPL-MCNC: 0.6 MG/DL (ref 0.2–1.3)
BUN SERPL-MCNC: 11 MG/DL (ref 7–30)
CALCIUM SERPL-MCNC: 8.8 MG/DL (ref 8.5–10.1)
CHLORIDE SERPL-SCNC: 107 MMOL/L (ref 94–109)
CO2 SERPL-SCNC: 26 MMOL/L (ref 20–32)
CREAT SERPL-MCNC: 0.91 MG/DL (ref 0.66–1.25)
GFR SERPL CREATININE-BSD FRML MDRD: >90 ML/MIN/{1.73_M2}
GLUCOSE SERPL-MCNC: 100 MG/DL (ref 70–99)
POTASSIUM SERPL-SCNC: 3.8 MMOL/L (ref 3.4–5.3)
PROT SERPL-MCNC: 7.7 G/DL (ref 6.8–8.8)
SODIUM SERPL-SCNC: 139 MMOL/L (ref 133–144)

## 2020-03-27 PROCEDURE — 80053 COMPREHEN METABOLIC PANEL: CPT | Performed by: FAMILY MEDICINE

## 2020-03-27 PROCEDURE — 80299 QUANTITATIVE ASSAY DRUG: CPT | Mod: 59 | Performed by: FAMILY MEDICINE

## 2020-03-27 PROCEDURE — 80299 QUANTITATIVE ASSAY DRUG: CPT | Performed by: FAMILY MEDICINE

## 2020-03-27 PROCEDURE — 36415 COLL VENOUS BLD VENIPUNCTURE: CPT | Performed by: FAMILY MEDICINE

## 2020-03-31 LAB
FLUCONAZOLE SERPL-MCNC: <0.5 UG/ML (ref 5–20)
ITRACONAZ SERPL-MCNC: 0.8 UG/ML (ref 0.5–5)
ITRACONAZ+HIT SERPL-MCNC: 1.9 UG/ML
OH-ITRACONAZ SERPL-MCNC: 1.1 UG/ML

## 2020-04-01 RX ORDER — ITRACONAZOLE 100 MG/1
CAPSULE ORAL
Qty: 150 CAPSULE | Refills: 3 | Status: SHIPPED | OUTPATIENT
Start: 2020-04-01 | End: 2020-08-11

## 2020-04-14 DIAGNOSIS — B40.2 PULMONARY BLASTOMYCOSIS (H): ICD-10-CM

## 2020-04-14 LAB
ALBUMIN SERPL-MCNC: 3.7 G/DL (ref 3.4–5)
ALP SERPL-CCNC: 96 U/L (ref 40–150)
ALT SERPL W P-5'-P-CCNC: 61 U/L (ref 0–70)
AST SERPL W P-5'-P-CCNC: 28 U/L (ref 0–45)
BILIRUB DIRECT SERPL-MCNC: 0.2 MG/DL (ref 0–0.2)
BILIRUB SERPL-MCNC: 0.8 MG/DL (ref 0.2–1.3)
PROT SERPL-MCNC: 8.1 G/DL (ref 6.8–8.8)

## 2020-04-14 PROCEDURE — 36415 COLL VENOUS BLD VENIPUNCTURE: CPT | Performed by: FAMILY MEDICINE

## 2020-04-14 PROCEDURE — 80076 HEPATIC FUNCTION PANEL: CPT | Performed by: FAMILY MEDICINE

## 2020-04-14 PROCEDURE — 80299 QUANTITATIVE ASSAY DRUG: CPT | Mod: 59 | Performed by: FAMILY MEDICINE

## 2020-04-14 PROCEDURE — 80299 QUANTITATIVE ASSAY DRUG: CPT | Performed by: FAMILY MEDICINE

## 2020-04-16 LAB
ITRACONAZ SERPL-MCNC: 2.1 UG/ML (ref 0.5–5)
ITRACONAZ+HIT SERPL-MCNC: 4.3 UG/ML
OH-ITRACONAZ SERPL-MCNC: 2.2 UG/ML

## 2020-04-23 ENCOUNTER — MEDICAL CORRESPONDENCE (OUTPATIENT)
Dept: HEALTH INFORMATION MANAGEMENT | Facility: CLINIC | Age: 35
End: 2020-04-23

## 2020-05-04 ENCOUNTER — TELEPHONE (OUTPATIENT)
Dept: FAMILY MEDICINE | Facility: OTHER | Age: 35
End: 2020-05-04

## 2020-05-04 DIAGNOSIS — B40.2 PULMONARY BLASTOMYCOSIS (H): Primary | ICD-10-CM

## 2020-05-04 NOTE — TELEPHONE ENCOUNTER
Contacted Dr Bautista at [facility], this is the correct order. Please fax order to the [facility] Dr Bautista. Thank you.

## 2020-05-04 NOTE — TELEPHONE ENCOUNTER
Dr. Bautista with [facility] called for follow up on request for CT scan of chest w/o contrast.  Provider sent fax for order request on 04/23/20 by fax. Writer unable to locate order placed in [facility].        Provider can be reached at the following number with any questions.    [phone]

## 2020-05-04 NOTE — TELEPHONE ENCOUNTER
Note said without contrast, but pended order was with and without.   I changed it to without and signed it.  Verify with Dr. Bautista's office.  Let me know if anything needs to be changed.

## 2020-05-06 ENCOUNTER — HOSPITAL ENCOUNTER (OUTPATIENT)
Dept: CT IMAGING | Facility: HOSPITAL | Age: 35
Discharge: HOME | End: 2020-05-06
Attending: FAMILY MEDICINE | Admitting: FAMILY MEDICINE
Payer: COMMERCIAL

## 2020-05-06 DIAGNOSIS — B40.2 PULMONARY BLASTOMYCOSIS (H): ICD-10-CM

## 2020-05-06 PROCEDURE — 71250 CT THORAX DX C-: CPT | Mod: TC

## 2020-05-07 ENCOUNTER — TELEPHONE (OUTPATIENT)
Dept: FAMILY MEDICINE | Facility: OTHER | Age: 35
End: 2020-05-07

## 2020-06-15 ENCOUNTER — TELEPHONE (OUTPATIENT)
Dept: FAMILY MEDICINE | Facility: OTHER | Age: 35
End: 2020-06-15

## 2020-06-15 DIAGNOSIS — B40.2 PULMONARY BLASTOMYCOSIS (H): Primary | ICD-10-CM

## 2020-06-15 NOTE — TELEPHONE ENCOUNTER
Received phone call from Dr. Bautista at [facility] regarding a mutual patient. States the patient has pulmonary blast mycosis and it has been a while since he last had his blood levels checked for Itraconazole. He is requesting Dr. Farrell order for his blood levels to be checked asap and monthly to monitor while he receives treatment. He said not to worry about transferring to [place] if she is busy, just needed her to get the message. States if she has any questions to contact him at [phone]. Ashley LeChevalier LPN

## 2020-06-15 NOTE — TELEPHONE ENCOUNTER
I placed a standing order for an Itraconazole level.  Please call patient and have him schedule a lab visit ASAP, and then monthly.  Thank you.

## 2020-06-15 NOTE — TELEPHONE ENCOUNTER
Called and left message for patient to call back regarding lab orders that have been placed per patient request. Lidia Lama LPN

## 2020-06-15 NOTE — TELEPHONE ENCOUNTER
Patient called back, informed patient of notes. Patient will call back to schedule lab. Ashley A. Lechevalier, LPN on 6/15/2020 at 4:26 PM

## 2020-06-18 DIAGNOSIS — B40.2 PULMONARY BLASTOMYCOSIS (H): ICD-10-CM

## 2020-06-18 LAB
ALBUMIN SERPL-MCNC: 3.6 G/DL (ref 3.4–5)
ALP SERPL-CCNC: 65 U/L (ref 40–150)
ALT SERPL W P-5'-P-CCNC: 37 U/L (ref 0–70)
AST SERPL W P-5'-P-CCNC: 16 U/L (ref 0–45)
BILIRUB DIRECT SERPL-MCNC: 0.1 MG/DL (ref 0–0.2)
BILIRUB SERPL-MCNC: 0.6 MG/DL (ref 0.2–1.3)
PROT SERPL-MCNC: 7.6 G/DL (ref 6.8–8.8)

## 2020-06-18 PROCEDURE — 36415 COLL VENOUS BLD VENIPUNCTURE: CPT | Performed by: FAMILY MEDICINE

## 2020-06-18 PROCEDURE — 80076 HEPATIC FUNCTION PANEL: CPT | Performed by: FAMILY MEDICINE

## 2020-06-18 PROCEDURE — 80299 QUANTITATIVE ASSAY DRUG: CPT | Performed by: FAMILY MEDICINE

## 2020-06-18 PROCEDURE — 80299 QUANTITATIVE ASSAY DRUG: CPT | Mod: 59 | Performed by: FAMILY MEDICINE

## 2020-06-23 LAB
ITRACONAZ SERPL-MCNC: 1.5 UG/ML (ref 0.5–5)
ITRACONAZ+HIT SERPL-MCNC: 3.8 UG/ML
OH-ITRACONAZ SERPL-MCNC: 2.3 UG/ML

## 2020-07-21 DIAGNOSIS — B40.2 PULMONARY BLASTOMYCOSIS (H): ICD-10-CM

## 2020-07-21 LAB
ALBUMIN SERPL-MCNC: 3.6 G/DL (ref 3.4–5)
ALP SERPL-CCNC: 92 U/L (ref 40–150)
ALT SERPL W P-5'-P-CCNC: 95 U/L (ref 0–70)
AST SERPL W P-5'-P-CCNC: 21 U/L (ref 0–45)
BILIRUB DIRECT SERPL-MCNC: 0.2 MG/DL (ref 0–0.2)
BILIRUB SERPL-MCNC: 0.8 MG/DL (ref 0.2–1.3)
PROT SERPL-MCNC: 7.5 G/DL (ref 6.8–8.8)

## 2020-07-21 PROCEDURE — 80299 QUANTITATIVE ASSAY DRUG: CPT | Mod: 59 | Performed by: FAMILY MEDICINE

## 2020-07-21 PROCEDURE — 80076 HEPATIC FUNCTION PANEL: CPT | Performed by: FAMILY MEDICINE

## 2020-07-21 PROCEDURE — 80299 QUANTITATIVE ASSAY DRUG: CPT | Performed by: FAMILY MEDICINE

## 2020-07-21 PROCEDURE — 36415 COLL VENOUS BLD VENIPUNCTURE: CPT | Performed by: FAMILY MEDICINE

## 2020-07-23 LAB
ITRACONAZ SERPL-MCNC: 1.2 UG/ML (ref 0.5–5)
ITRACONAZ+HIT SERPL-MCNC: 3.1 UG/ML
OH-ITRACONAZ SERPL-MCNC: 1.9 UG/ML

## 2020-08-11 ENCOUNTER — NURSE TRIAGE (OUTPATIENT)
Dept: FAMILY MEDICINE | Facility: OTHER | Age: 35
End: 2020-08-11

## 2020-08-11 NOTE — TELEPHONE ENCOUNTER
It is possible - take meds with food BUT has to continue taking and finishing the complete course of medication .

## 2020-08-11 NOTE — TELEPHONE ENCOUNTER
Pt stated he was updated by nurse with Dr. Pride agrees to plan. Coming in for standing lab per pt report.

## 2020-08-11 NOTE — TELEPHONE ENCOUNTER
Pt has medication question, believes itraconazole is possibly causing stomach pains, indigestion symptoms usually appear after eating. Symptoms present for one week.   Pt is not having stomach pain now, is experiencing mild indigestion now.   Pt advised to go to ED if experiencing abdominal pain or chest pain, pt verbalizes understanding and agrees to plan.   Pt advised PCP out until early next week.   Pt also advised we will route his concerns to covering provider at this time.     Appointment held for patient.   Next 5 appointments (look out 90 days)    Aug 27, 2020  1:30 PM CDT  (Arrive by 1:15 PM)  SHORT with Noelle Farrell MD  [facility] ([facility] [place] ) [address]  [place] [address]  [phone]

## 2020-08-13 DIAGNOSIS — B40.2 PULMONARY BLASTOMYCOSIS (H): ICD-10-CM

## 2020-08-13 LAB
ALBUMIN SERPL-MCNC: 3.7 G/DL (ref 3.4–5)
ALP SERPL-CCNC: 68 U/L (ref 40–150)
ALT SERPL W P-5'-P-CCNC: 32 U/L (ref 0–70)
AST SERPL W P-5'-P-CCNC: 14 U/L (ref 0–45)
BILIRUB DIRECT SERPL-MCNC: 0.2 MG/DL (ref 0–0.2)
BILIRUB SERPL-MCNC: 1 MG/DL (ref 0.2–1.3)
PROT SERPL-MCNC: 7.8 G/DL (ref 6.8–8.8)

## 2020-08-13 PROCEDURE — 36415 COLL VENOUS BLD VENIPUNCTURE: CPT | Performed by: FAMILY MEDICINE

## 2020-08-13 PROCEDURE — 80299 QUANTITATIVE ASSAY DRUG: CPT | Performed by: FAMILY MEDICINE

## 2020-08-13 PROCEDURE — 80299 QUANTITATIVE ASSAY DRUG: CPT | Mod: 59 | Performed by: FAMILY MEDICINE

## 2020-08-13 PROCEDURE — 80076 HEPATIC FUNCTION PANEL: CPT | Performed by: FAMILY MEDICINE

## 2020-08-18 LAB
ITRACONAZ SERPL-MCNC: 1.2 UG/ML (ref 0.5–5)
ITRACONAZ+HIT SERPL-MCNC: 2.8 UG/ML
OH-ITRACONAZ SERPL-MCNC: 1.6 UG/ML

## 2020-09-17 ENCOUNTER — TELEPHONE (OUTPATIENT)
Dept: FAMILY MEDICINE | Facility: OTHER | Age: 35
End: 2020-09-17

## 2020-09-17 DIAGNOSIS — J18.9 PNEUMONIA OF RIGHT LOWER LOBE DUE TO INFECTIOUS ORGANISM: Primary | ICD-10-CM

## 2020-09-17 DIAGNOSIS — Z16.20: ICD-10-CM

## 2020-09-17 DIAGNOSIS — J13: ICD-10-CM

## 2020-09-17 DIAGNOSIS — J18.9 PNEUMONIA DUE TO INFECTIOUS ORGANISM, UNSPECIFIED LATERALITY, UNSPECIFIED PART OF LUNG: ICD-10-CM

## 2020-09-17 NOTE — TELEPHONE ENCOUNTER
Called & faxed from [facility] requesting chest ct with out contrast to f/u resolution of pneumonia. Order pending.  Justine Elias LPN

## 2020-09-18 NOTE — TELEPHONE ENCOUNTER
Efrain Carranza         Subjective:  The above named patient presents to the clinic for follow-up of painful verruca(e) on the feet.  1 lesions are present.      Objective:  Each lesion present shows obliterated skin lines, dermal papillae, hyperkeratotic build-up, well-circumscribed borders, and pain with lateral compression.  On today's date all remaining lesions are healed.    Assessment:  Verrucous lesions, painful     Location of lesion(s):  Dorsal medial right fourth toe    Plan:  There was examination of the feet today.  The following options were discussed, and then performed once verbal consent was obtained:     ___Debridement of overlying hyperkeratotic tissue using sharp debridement and         aseptic technique, with minimal time and effort   ___Debridement of superficially infected tissue using sharp debridement and         Aseptic technique   ___Application of 60% salicylic acid followed by application of dry dressing with         cotton ball today   ___Cryotherapy to each lesion today   _x__Application of cantharone to each lesion today   ___Adjunctive therapy using oral Cimetidine   ___Recommendation of 5-fluorouracil cream under occlusion BID at home   ___Use of Imiquimod 5% cream three times per week for up to 16 weeks at home   ___Injection of candin today   ___Use of Rx anti-perspirants as adjunctive therapy   ___Patient to apply topical OTC preparation to wart(s) every day at home   ___Schedule pulsed dye laser in next 1 to 2 weeks   ___Schedule blunt dissection/excision of wart(s)   _x__Patient to follow up in 4 weeks.  If no resolution of wart on the next visit, would recommend excisional biopsy.    Isaac Wtakins DPM         With out sorry, correct order pending.  Justine Elias, JOSLYNN

## 2020-09-24 ENCOUNTER — HOSPITAL ENCOUNTER (OUTPATIENT)
Dept: CT IMAGING | Facility: HOSPITAL | Age: 35
Discharge: HOME | End: 2020-09-24
Attending: FAMILY MEDICINE | Admitting: FAMILY MEDICINE
Payer: COMMERCIAL

## 2020-09-24 DIAGNOSIS — J18.9 PNEUMONIA DUE TO INFECTIOUS ORGANISM, UNSPECIFIED LATERALITY, UNSPECIFIED PART OF LUNG: ICD-10-CM

## 2020-09-24 DIAGNOSIS — Z16.20: ICD-10-CM

## 2020-09-24 DIAGNOSIS — J18.9 PNEUMONIA OF RIGHT LOWER LOBE DUE TO INFECTIOUS ORGANISM: ICD-10-CM

## 2020-09-24 DIAGNOSIS — J13: ICD-10-CM

## 2020-09-24 PROCEDURE — 71250 CT THORAX DX C-: CPT | Mod: TC

## 2020-10-22 ENCOUNTER — NURSE TRIAGE (OUTPATIENT)
Dept: FAMILY MEDICINE | Facility: OTHER | Age: 35
End: 2020-10-22

## 2020-10-22 DIAGNOSIS — Z20.822 COVID-19 RULED OUT: Primary | ICD-10-CM

## 2020-10-22 NOTE — TELEPHONE ENCOUNTER
Pt with exposure to a friend testing positive, see protocol for details.     Covid testing scheduled:    Next 5 appointments (look out 90 days)    Oct 23, 2020  9:30 AM  (Arrive by 9:15 AM)  SHORT with [facility] [place] ([facility] [place] ) [address]  [place] [address]  [phone]        Home Care instruction for exposure provided to the patient- 14 day Quaratine from date of exposure even if negative result.      Reason for Disposition    [1] COVID-19 EXPOSURE (Close Contact) AND [2] within last 14 days BUT [3] NO symptoms    Additional Information    Negative: COVID-19 has been diagnosed by a healthcare provider (HCP)    Negative: COVID-19 lab test positive    Negative: [1] Symptoms of COVID-19 (e.g., cough, fever, SOB, or others) AND [2] lives in an area with community spread    Negative: [1] Symptoms of COVID-19 (e.g., cough, fever, SOB, or others) AND [2] within 14 days of EXPOSURE (close contact) with diagnosed or suspected COVID-19 patient    Negative: [1] Symptoms of COVID-19 (e.g., cough, fever, SOB, or others) AND [2] within 14 days of travel from high-risk area for COVID-19 community spread (identified by CDC)    Negative: [1] Difficulty breathing (shortness of breath) occurs AND [2] onset > 14 days after COVID-19 EXPOSURE (Close Contact) AND [3] no community spread where patient lives    Negative: [1] Dry cough occurs AND [2] onset > 14 days after COVID-19 EXPOSURE AND [3] no community spread where patient lives    Negative: [1] Wet cough (i.e., white-yellow, yellow, green, or palu colored sputum) AND [2] onset > 14 days after COVID-19 EXPOSURE AND [3] no community spread where patient lives    Negative: [1] Common cold symptoms AND [2] onset > 14 days after COVID-19 EXPOSURE AND [3] no community spread where patient lives    Negative: [1] COVID-19 EXPOSURE (Close Contact) within last 14 days AND [2] needs COVID-19 lab test to return to  "work AND [3] NO symptoms    Negative: [1] COVID-19 EXPOSURE (Close Contact) within last 14 days AND [2] 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 CLOSE CONTACT: \"Who is the person with the confirmed or suspected COVID-19 infection that you were exposed to?\"      Friend of the patients    2. PLACE of CONTACT: \"Where were you when you were exposed to COVID-19?\" (e.g., home, school, medical waiting room; which city?)      [employer] [employer]    3. TYPE of CONTACT: \"How much contact was there?\" (e.g., sitting next to, live in same house, work in same office, same building)      At a bonfire with the patient who tested positive, outdoors, within 6' for > 15 minutes throughout the evening.     4. DURATION of CONTACT: \"How long were you in contact with the COVID-19 patient?\" (e.g., a few seconds, passed by person, a few minutes, live with the patient)      Greater than 15 minutes during the time of being at the bonfire.     5. DATE of CONTACT: \"When did you have contact with a COVID-19 patient?\" (e.g., how many days ago)      10/17/20    6. TRAVEL: \"Have you traveled out of the country recently?\" If so, \"When and where?\"      * Also ask about out-of-state travel, since the CDC has identified some high-risk cities for community spread in the .      * Note: Travel becomes less relevant if there is widespread community transmission where the patient lives.      No     7. COMMUNITY SPREAD: \"Are there lots of cases of COVID-19 (community spread) where you live?\" (See public health department website, if unsure)        Yes cases are increasing    8. SYMPTOMS: \"Do you have any symptoms?\" (e.g., fever, cough, breathing difficulty)      No     9. PREGNANCY OR POSTPARTUM: \"Is there any chance you are pregnant?\" \"When was your last menstrual " "period?\" \"Did you deliver in the last 2 weeks?\"    NA      10. HIGH RISK: \"Do you have any heart or lung problems? Do you have a weak immune system?\" (e.g., CHF, COPD, asthma, HIV positive, chemotherapy, renal failure, diabetes mellitus, sickle cell anemia)        No    Protocols used: CORONAVIRUS (COVID-19) EXPOSURE-A- 8.4.20      "

## 2021-02-11 DIAGNOSIS — I10 ESSENTIAL HYPERTENSION: ICD-10-CM

## 2021-02-11 DIAGNOSIS — E03.9 HYPOTHYROIDISM, UNSPECIFIED TYPE: ICD-10-CM

## 2021-02-11 RX ORDER — LEVOTHYROXINE SODIUM 112 UG/1
TABLET ORAL
Qty: 90 TABLET | Refills: 0 | Status: SHIPPED | OUTPATIENT
Start: 2021-02-11 | End: 2021-09-01

## 2021-02-11 RX ORDER — LISINOPRIL 10 MG/1
TABLET ORAL
Qty: 90 TABLET | Refills: 0 | Status: SHIPPED | OUTPATIENT
Start: 2021-02-11 | End: 2021-06-15

## 2021-02-11 NOTE — TELEPHONE ENCOUNTER
Levothyroxine 112mcg      Last Written Prescription Date:  8/18/2020  Last Fill Quantity: 90,   # refills: 0  Last Office Visit: 3/26/2020  Future Office visit:       Routing refill request to provider for review/approval because:    Lisinopril 10mg      Last Written Prescription Date:  8/18/2020  Last Fill Quantity: 90,   # refills: 0  Last Office Visit: 3/26/2020  Future Office visit:       Routing refill request to provider for review/approval because:

## 2021-08-31 NOTE — PROGRESS NOTES
Assessment & Plan     Essential hypertension  At goal on recheck.  No home readings to go off of.  Lots of room for lifestyle modifications - counseling performed.  Lab today and refills.  - Comprehensive metabolic panel (BMP + Alb, Alk Phos, ALT, AST, Total. Bili, TP); Future  - lisinopril (ZESTRIL) 10 MG tablet; Take 1 tablet (10 mg) by mouth daily    Hypothyroidism, unspecified type  - TSH with free T4 reflex; Future  - levothyroxine (SYNTHROID/LEVOTHROID) 112 MCG tablet; Take 1 tablet (112 mcg) by mouth daily    Morbid obesity (H)  Weight loss when sick with Blasto.  Gained it back.  Discussed scheduling exercise - even starting with 15 min on treatment before and after work.  Job is completely sedentary.    Discussed diet changes - mostly meat and carbs.  Limited cooking.  Suggested cut up veggies as snacks - can prep week at a time.  Reduce bread.  Encouraged variety.    Consider consult with weight management program.  He would like to try some things on his own first.    Need for vaccination  Updated.  - TDAP VACCINE (Adacel, Boostrix)  [[id number]]    Encounter for hepatitis C screening test for low risk patient  - Hepatitis C Screen Reflex to HCV RNA Quant and Genotype; Future    Lipid screening  Fasting today.  - Lipid Profile (Chol, Trig, HDL, LDL calc); Future         Patient Instructions   Medications refilled.  Will call with lab results.  Diet and exercise efforts as discussed.  Tdap updated.      No follow-ups on file.    Noelle Shin MD  [facility] - LUCILA Delaney is a 35 year old who presents for the following health issues     HPI     Tdap given today.    Hypertension Follow-up    Do you check your blood pressure regularly outside of the clinic? No     Are you following a low salt diet? No    Are your blood pressures ever more than 140 on the top number (systolic) OR more   than 90 on the bottom number (diastolic), for example 140/90? Yes   Morbid  obesity.  Lisinopril 10 mg daily - rarely forgets to take it.  No side effects.  Weight gain 55 pounds. Had lost weight when sick.  Minimal exercise.    12 hour .    Got a treadmill.  Doesn't cook.  Grills some - brats, steak.  No sides.  No breakfast.  Beverages - mostly water.  No coffee/soda.  Alcohol - beer/rum - every other weekend.  No veggies.  Limited fruit.    Hypothyroidism Follow-up    Since last visit, patient describes the following symptoms: weight gain of 55 lbs    Hyperlipidemia Follow-Up    Are you regularly taking any medication or supplement to lower your cholesterol?   No    Are you having muscle aches or other side effects that you think could be caused by your cholesterol lowering medication?  No     2019 panel -total 223,       Review of Systems   Constitutional, HEENT, cardiovascular, pulmonary, gi and gu systems are negative, except as otherwise noted.      Objective    BP (!) 149/85 (BP Location: Left arm, Patient Position: Chair, Cuff Size: Adult Large)   Pulse 90   Temp 97.2  F (36.2  C) (Tympanic)   Resp 18   Wt (!) 167.8 kg (370 lb)   SpO2 97%   BMI 53.09 kg/m    Body mass index is 53.09 kg/m .  Physical Exam   GENERAL: alert, no distress and obese  EYES: Eyes grossly normal to inspection, PERRL and conjunctivae and sclerae normal  NECK: no adenopathy, no asymmetry, masses, or scars and thyroid normal to palpation  RESP: lungs clear to auscultation - no rales, rhonchi or wheezes  CV: regular rate and rhythm, normal S1 S2, no S3 or S4, no murmur, click or rub, no peripheral edema and peripheral pulses strong  MS: no gross musculoskeletal defects noted, no edema  PSYCH: mentation appears normal, affect normal/bright    Labs pending.

## 2021-09-01 ENCOUNTER — OFFICE VISIT (OUTPATIENT)
Dept: FAMILY MEDICINE | Facility: OTHER | Age: 36
End: 2021-09-01
Attending: FAMILY MEDICINE
Payer: COMMERCIAL

## 2021-09-01 VITALS
OXYGEN SATURATION: 97 % | DIASTOLIC BLOOD PRESSURE: 70 MMHG | WEIGHT: 315 LBS | BODY MASS INDEX: 53.09 KG/M2 | HEART RATE: 90 BPM | TEMPERATURE: 97.2 F | SYSTOLIC BLOOD PRESSURE: 128 MMHG | RESPIRATION RATE: 18 BRPM

## 2021-09-01 DIAGNOSIS — E03.9 HYPOTHYROIDISM, UNSPECIFIED TYPE: ICD-10-CM

## 2021-09-01 DIAGNOSIS — Z11.59 ENCOUNTER FOR HEPATITIS C SCREENING TEST FOR LOW RISK PATIENT: ICD-10-CM

## 2021-09-01 DIAGNOSIS — Z23 NEED FOR VACCINATION: ICD-10-CM

## 2021-09-01 DIAGNOSIS — I10 ESSENTIAL HYPERTENSION: Primary | ICD-10-CM

## 2021-09-01 DIAGNOSIS — E66.01 MORBID OBESITY (H): ICD-10-CM

## 2021-09-01 DIAGNOSIS — Z13.220 LIPID SCREENING: ICD-10-CM

## 2021-09-01 LAB
ALBUMIN SERPL-MCNC: 4 G/DL (ref 3.4–5)
ALP SERPL-CCNC: 57 U/L (ref 40–150)
ALT SERPL W P-5'-P-CCNC: 57 U/L (ref 0–70)
ANION GAP SERPL CALCULATED.3IONS-SCNC: 5 MMOL/L (ref 3–14)
AST SERPL W P-5'-P-CCNC: 25 U/L (ref 0–45)
BILIRUB SERPL-MCNC: 0.7 MG/DL (ref 0.2–1.3)
BUN SERPL-MCNC: 16 MG/DL (ref 7–30)
CALCIUM SERPL-MCNC: 8.5 MG/DL (ref 8.5–10.1)
CHLORIDE BLD-SCNC: 105 MMOL/L (ref 94–109)
CHOLEST SERPL-MCNC: 190 MG/DL
CO2 SERPL-SCNC: 25 MMOL/L (ref 20–32)
CREAT SERPL-MCNC: 1.11 MG/DL (ref 0.66–1.25)
FASTING STATUS PATIENT QL REPORTED: YES
GFR SERPL CREATININE-BSD FRML MDRD: 87 ML/MIN/1.73M2
GLUCOSE BLD-MCNC: 83 MG/DL (ref 70–99)
HDLC SERPL-MCNC: 45 MG/DL
LDLC SERPL CALC-MCNC: 126 MG/DL
NONHDLC SERPL-MCNC: 145 MG/DL
POTASSIUM BLD-SCNC: 4.2 MMOL/L (ref 3.4–5.3)
PROT SERPL-MCNC: 8.1 G/DL (ref 6.8–8.8)
SODIUM SERPL-SCNC: 135 MMOL/L (ref 133–144)
T4 FREE SERPL-MCNC: 0.85 NG/DL (ref 0.76–1.46)
TRIGL SERPL-MCNC: 93 MG/DL
TSH SERPL DL<=0.005 MIU/L-ACNC: 12.2 MU/L (ref 0.4–4)

## 2021-09-01 PROCEDURE — 36415 COLL VENOUS BLD VENIPUNCTURE: CPT | Performed by: FAMILY MEDICINE

## 2021-09-01 PROCEDURE — 99214 OFFICE O/P EST MOD 30 MIN: CPT | Mod: 25 | Performed by: FAMILY MEDICINE

## 2021-09-01 PROCEDURE — 84443 ASSAY THYROID STIM HORMONE: CPT | Performed by: FAMILY MEDICINE

## 2021-09-01 PROCEDURE — 90715 TDAP VACCINE 7 YRS/> IM: CPT | Performed by: FAMILY MEDICINE

## 2021-09-01 PROCEDURE — 80061 LIPID PANEL: CPT | Performed by: FAMILY MEDICINE

## 2021-09-01 PROCEDURE — 84439 ASSAY OF FREE THYROXINE: CPT | Performed by: FAMILY MEDICINE

## 2021-09-01 PROCEDURE — 86803 HEPATITIS C AB TEST: CPT | Performed by: FAMILY MEDICINE

## 2021-09-01 PROCEDURE — 80053 COMPREHEN METABOLIC PANEL: CPT | Performed by: FAMILY MEDICINE

## 2021-09-01 PROCEDURE — 90471 IMMUNIZATION ADMIN: CPT | Performed by: FAMILY MEDICINE

## 2021-09-01 RX ORDER — LEVOTHYROXINE SODIUM 125 UG/1
125 TABLET ORAL DAILY
Qty: 90 TABLET | Refills: 0 | Status: SHIPPED | OUTPATIENT
Start: 2021-09-01 | End: 2021-11-10

## 2021-09-01 RX ORDER — LISINOPRIL 10 MG/1
10 TABLET ORAL DAILY
Qty: 90 TABLET | Refills: 3 | Status: SHIPPED | OUTPATIENT
Start: 2021-09-01 | End: 2022-02-16

## 2021-09-01 RX ORDER — LEVOTHYROXINE SODIUM 112 UG/1
112 TABLET ORAL DAILY
Qty: 90 TABLET | Refills: 3 | Status: SHIPPED | OUTPATIENT
Start: 2021-09-01 | End: 2021-09-01

## 2021-09-01 ASSESSMENT — PAIN SCALES - GENERAL: PAINLEVEL: NO PAIN (0)

## 2021-09-01 NOTE — PATIENT INSTRUCTIONS
Medications refilled.  Will call with lab results.  Diet and exercise efforts as discussed.  Tdap updated.

## 2021-09-01 NOTE — NURSING NOTE
"Chief Complaint   Patient presents with     Hypertension     Thyroid Problem     Lipids       Initial BP (!) 149/85 (BP Location: Left arm, Patient Position: Chair, Cuff Size: Adult Large)   Pulse 90   Temp 97.2  F (36.2  C) (Tympanic)   Resp 18   Wt (!) 167.8 kg (370 lb)   SpO2 97%   BMI 53.09 kg/m   Estimated body mass index is 53.09 kg/m  as calculated from the following:    Height as of 3/5/20: 1.778 m (5' 10\").    Weight as of this encounter: 167.8 kg (370 lb).  Medication Reconciliation: complete  Esperanza Scruggs LPN  "

## 2021-09-02 DIAGNOSIS — E03.9 HYPOTHYROIDISM, UNSPECIFIED TYPE: Primary | ICD-10-CM

## 2021-09-02 LAB — HCV AB SERPL QL IA: NONREACTIVE

## 2021-11-09 ENCOUNTER — LAB (OUTPATIENT)
Dept: LAB | Facility: OTHER | Age: 36
End: 2021-11-09
Payer: COMMERCIAL

## 2021-11-09 DIAGNOSIS — E03.9 HYPOTHYROIDISM, UNSPECIFIED TYPE: ICD-10-CM

## 2021-11-09 LAB
T4 FREE SERPL-MCNC: 0.89 NG/DL (ref 0.76–1.46)
TSH SERPL DL<=0.005 MIU/L-ACNC: 13.29 MU/L (ref 0.4–4)

## 2021-11-09 PROCEDURE — 36415 COLL VENOUS BLD VENIPUNCTURE: CPT

## 2021-11-09 PROCEDURE — 84439 ASSAY OF FREE THYROXINE: CPT

## 2021-11-09 PROCEDURE — 84443 ASSAY THYROID STIM HORMONE: CPT

## 2021-11-10 DIAGNOSIS — E03.9 HYPOTHYROIDISM, UNSPECIFIED TYPE: ICD-10-CM

## 2021-11-10 RX ORDER — LEVOTHYROXINE SODIUM 137 UG/1
137 TABLET ORAL DAILY
Qty: 30 TABLET | Refills: 1 | Status: SHIPPED | OUTPATIENT
Start: 2021-11-10 | End: 2021-12-06

## 2021-12-03 DIAGNOSIS — E03.9 HYPOTHYROIDISM, UNSPECIFIED TYPE: ICD-10-CM

## 2021-12-06 RX ORDER — LEVOTHYROXINE SODIUM 137 UG/1
TABLET ORAL
Qty: 30 TABLET | Refills: 1 | Status: SHIPPED | OUTPATIENT
Start: 2021-12-06 | End: 2022-01-10

## 2021-12-06 NOTE — TELEPHONE ENCOUNTER
levothyroxine      Last Written Prescription Date:  11/10/21  Last Fill Quantity: 30,   # refills: 1  Last Office Visit: 9/1/21  Future Office visit:

## 2022-02-16 ENCOUNTER — OFFICE VISIT (OUTPATIENT)
Dept: FAMILY MEDICINE | Facility: OTHER | Age: 37
End: 2022-02-16
Attending: FAMILY MEDICINE
Payer: COMMERCIAL

## 2022-02-16 ENCOUNTER — ANCILLARY PROCEDURE (OUTPATIENT)
Dept: GENERAL RADIOLOGY | Facility: OTHER | Age: 37
End: 2022-02-16
Attending: FAMILY MEDICINE
Payer: COMMERCIAL

## 2022-02-16 VITALS
HEIGHT: 69 IN | SYSTOLIC BLOOD PRESSURE: 134 MMHG | BODY MASS INDEX: 46.65 KG/M2 | OXYGEN SATURATION: 98 % | TEMPERATURE: 97.6 F | HEART RATE: 90 BPM | WEIGHT: 315 LBS | RESPIRATION RATE: 18 BRPM | DIASTOLIC BLOOD PRESSURE: 70 MMHG

## 2022-02-16 DIAGNOSIS — G89.29 CHRONIC PAIN OF RIGHT KNEE: ICD-10-CM

## 2022-02-16 DIAGNOSIS — H69.91 ETD (EUSTACHIAN TUBE DYSFUNCTION), RIGHT: ICD-10-CM

## 2022-02-16 DIAGNOSIS — R06.83 SNORING: ICD-10-CM

## 2022-02-16 DIAGNOSIS — E66.01 MORBID OBESITY (H): ICD-10-CM

## 2022-02-16 DIAGNOSIS — M25.561 CHRONIC PAIN OF RIGHT KNEE: ICD-10-CM

## 2022-02-16 DIAGNOSIS — I10 ESSENTIAL HYPERTENSION: Primary | ICD-10-CM

## 2022-02-16 DIAGNOSIS — E03.9 HYPOTHYROIDISM, UNSPECIFIED TYPE: ICD-10-CM

## 2022-02-16 LAB
ANION GAP SERPL CALCULATED.3IONS-SCNC: 1 MMOL/L (ref 3–14)
BUN SERPL-MCNC: 15 MG/DL (ref 7–30)
CALCIUM SERPL-MCNC: 8.9 MG/DL (ref 8.5–10.1)
CHLORIDE BLD-SCNC: 107 MMOL/L (ref 94–109)
CO2 SERPL-SCNC: 27 MMOL/L (ref 20–32)
CREAT SERPL-MCNC: 1.08 MG/DL (ref 0.66–1.25)
GFR SERPL CREATININE-BSD FRML MDRD: >90 ML/MIN/1.73M2
GLUCOSE BLD-MCNC: 92 MG/DL (ref 70–99)
POTASSIUM BLD-SCNC: 4.2 MMOL/L (ref 3.4–5.3)
SODIUM SERPL-SCNC: 135 MMOL/L (ref 133–144)
T4 FREE SERPL-MCNC: 1 NG/DL (ref 0.76–1.46)
TSH SERPL DL<=0.005 MIU/L-ACNC: 7.56 MU/L (ref 0.4–4)

## 2022-02-16 PROCEDURE — 80048 BASIC METABOLIC PNL TOTAL CA: CPT | Performed by: FAMILY MEDICINE

## 2022-02-16 PROCEDURE — 73562 X-RAY EXAM OF KNEE 3: CPT | Mod: TC | Performed by: RADIOLOGY

## 2022-02-16 PROCEDURE — 36415 COLL VENOUS BLD VENIPUNCTURE: CPT | Performed by: FAMILY MEDICINE

## 2022-02-16 PROCEDURE — 99214 OFFICE O/P EST MOD 30 MIN: CPT | Performed by: FAMILY MEDICINE

## 2022-02-16 PROCEDURE — 84443 ASSAY THYROID STIM HORMONE: CPT | Performed by: FAMILY MEDICINE

## 2022-02-16 PROCEDURE — 84439 ASSAY OF FREE THYROXINE: CPT | Performed by: FAMILY MEDICINE

## 2022-02-16 RX ORDER — LISINOPRIL 10 MG/1
10 TABLET ORAL DAILY
Qty: 90 TABLET | Refills: 3 | Status: SHIPPED | OUTPATIENT
Start: 2022-02-16 | End: 2023-03-09

## 2022-02-16 RX ORDER — LEVOTHYROXINE SODIUM 150 UG/1
137 TABLET ORAL DAILY
Qty: 90 TABLET | Refills: 0 | Status: SHIPPED | OUTPATIENT
Start: 2022-02-16 | End: 2022-05-16

## 2022-02-16 ASSESSMENT — PAIN SCALES - GENERAL: PAINLEVEL: NO PAIN (0)

## 2022-02-16 NOTE — PROGRESS NOTES
"  Assessment & Plan     Essential hypertension  Stable - overall at goal.  Continue Lisinopril 10 mg.  BMP today.  - Basic metabolic panel; Future  - lisinopril (ZESTRIL) 10 MG tablet; Take 1 tablet (10 mg) by mouth daily  - Basic metabolic panel    Hypothyroidism, unspecified type  Persistently elevated TSH, but low normal T4.  Dose increased 11/2021 - recheck today and adjust accordingly.  - TSH with free T4 reflex; Future  - TSH with free T4 reflex    Morbid obesity (H)  Diet/exercise counseling performed.  With change in job - will be more active, less eating.  Did offer nutrition consult.  Given BMI and snoring - suspect HARITHA - sleep referral placed.  - Adult Sleep Eval & Management Referral; Future    Chronic pain of right knee  Suspect OA given obesity but may be PFS - anterior knee pain -worse with stairs.  - XR Knee Right 3 Views; Future    Snoring  As above  - Adult Sleep Eval & Management Referral; Future    ETD (Eustachian tube dysfunction), right  Exam reassuring.   Discussed limited use decongestants.  Trial of OTC nasal steroid - takes time to take effect.        BMI:   Estimated body mass index is 56.12 kg/m  as calculated from the following:    Height as of this encounter: 1.753 m (5' 9\").    Weight as of this encounter: 172.4 kg (380 lb).   Weight management plan: Discussed healthy diet and exercise guidelines    Patient Instructions   Sleep referral for sleep study placed - evaluate for sleep apnea.    Lab today and xray - will call with results and adjust Synthroid accordingly.    Consider physical therapy vs injection for knee.              Noelle Shin MD  [facility] - [place] Delaney is a 36 year old who presents for the following health issues     Healthy Habits:    Getting at least 3 servings of Calcium per day:  Yes    Bi-annual eye exam:  Yes    Dental care twice a year:  Yes    Sleep apnea or symptoms of sleep apnea:  None    Diet:  Regular (no " restrictions)    Frequency of exercise:  2-3 days/week    Duration of exercise:  15-30 minutes    Taking medications regularly:  Yes    Barriers to taking medications:  Not applicable    Medication side effects:  Not applicable    PHQ-2 Total Score:    Additional concerns today:  No     Flu - declined    Weight back up - was 375, down to 320, back up to 380.  Had lost a lot when he was sick with Blasto for so long.  Feels much better now.  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  And will be eating less.  Eats when sedentary.    Right knee pain -   More so with activity.  No specific injury.    Pain is frontal.  No swelling, redness, rash.  Sometimes agrevated with stairs.  No OTC agents.    Right ear - plugged x weeks; no drainage.  Plugged.  No pain.    Hypertension Follow-up - on Lisinopril 10 mg    Do you check your blood pressure regularly outside of the clinic? No     Are you following a low salt diet? No    Are your blood pressures ever more than 140 on the top number (systolic) OR more   than 90 on the bottom number (diastolic), for example 140/90? No    Hypothyroidism Follow-up    Since last visit, patient describes the following symptoms: Weight stable, no hair loss, no skin changes, no constipation, no loose stools    Good energy    Sleep is good    Snoring - unsure amount; no apnea    Mom sleep apnea; overweight      How many servings of fruits and vegetables do you eat daily?  2-3    On average, how many sweetened beverages do you drink each day (Examples: soda, juice, sweet tea, etc.  Do NOT count diet or artificially sweetened beverages)?   0    How many days per week do you exercise enough to make your heart beat faster? 3 or less    How many minutes a day do you exercise enough to make your heart beat faster? 20 - 29    How many days per week do you miss taking your medication? 0      Review of Systems   Constitutional, HEENT, cardiovascular, pulmonary, gi and gu systems  "are negative, except as otherwise noted.      Objective    /70 (BP Location: Left arm, Patient Position: Sitting, Cuff Size: Adult Large)   Pulse 90   Temp 97.6  F (36.4  C) (Tympanic)   Resp 18   Ht 1.753 m (5' 9\")   Wt (!) 172.4 kg (380 lb)   SpO2 98%   BMI 56.12 kg/m    Body mass index is 56.12 kg/m .  Physical Exam   GENERAL: alert, no distress and obese  EYES: Eyes grossly normal to inspection, PERRL and conjunctivae and sclerae normal  HENT: ear canals and TM's normal - other than scarring from prior tubes, nose and mouth without ulcers or lesions  NECK: no adenopathy, no asymmetry, masses, or scars and thyroid normal to palpation  RESP: lungs clear to auscultation - no rales, rhonchi or wheezes  CV: regular rate and rhythm, normal S1 S2, no S3 or S4, no murmur, click or rub, no peripheral edema and peripheral pulses strong  ABDOMEN: soft, nontender, no hepatosplenomegaly, no masses and bowel sounds normal  MS: normal muscle tone, no edema and right knee with full AROM without crepitus; non-tender throughout  PSYCH: mentation appears normal, affect normal/bright    Labs pending.            "

## 2022-02-16 NOTE — PATIENT INSTRUCTIONS
Sleep referral for sleep study placed - evaluate for sleep apnea.    Lab today and xray - will call with results and adjust Synthroid accordingly.    Consider physical therapy vs injection for knee.

## 2022-02-16 NOTE — NURSING NOTE
"Chief Complaint   Patient presents with     Hypertension     Thyroid Problem       Initial /70 (BP Location: Left arm, Patient Position: Sitting, Cuff Size: Adult Large)   Pulse 90   Temp 97.6  F (36.4  C) (Tympanic)   Resp 18   Ht 1.753 m (5' 9\")   Wt (!) 172.4 kg (380 lb)   SpO2 98%   BMI 56.12 kg/m   Estimated body mass index is 56.12 kg/m  as calculated from the following:    Height as of this encounter: 1.753 m (5' 9\").    Weight as of this encounter: 172.4 kg (380 lb).  Medication Reconciliation: complete  Katia Hubbard MA  "

## 2022-06-01 ENCOUNTER — LAB (OUTPATIENT)
Dept: LAB | Facility: OTHER | Age: 37
End: 2022-06-01
Payer: COMMERCIAL

## 2022-06-01 DIAGNOSIS — E03.9 HYPOTHYROIDISM, UNSPECIFIED TYPE: ICD-10-CM

## 2022-06-01 LAB
T4 FREE SERPL-MCNC: 1.07 NG/DL (ref 0.76–1.46)
TSH SERPL DL<=0.005 MIU/L-ACNC: 5.32 MU/L (ref 0.4–4)

## 2022-06-01 PROCEDURE — 36415 COLL VENOUS BLD VENIPUNCTURE: CPT

## 2022-06-01 PROCEDURE — 84439 ASSAY OF FREE THYROXINE: CPT

## 2022-06-01 PROCEDURE — 84443 ASSAY THYROID STIM HORMONE: CPT

## 2022-06-01 RX ORDER — LEVOTHYROXINE SODIUM 175 UG/1
175 TABLET ORAL DAILY
Qty: 90 TABLET | Refills: 0 | Status: SHIPPED | OUTPATIENT
Start: 2022-06-01 | End: 2022-08-30

## 2022-06-08 DIAGNOSIS — E03.9 HYPOTHYROIDISM, UNSPECIFIED TYPE: ICD-10-CM

## 2022-06-08 RX ORDER — LEVOTHYROXINE SODIUM 150 UG/1
TABLET ORAL
Qty: 30 TABLET | Refills: 0 | Status: SHIPPED | OUTPATIENT
Start: 2022-06-08 | End: 2023-06-02

## 2022-06-08 NOTE — TELEPHONE ENCOUNTER
Levothyroxine      Last Written Prescription Date:  6/1/2022  Last Fill Quantity: 90,   # refills: 0  Last Office Visit: 2/16/2022  Future Office visit:

## 2022-06-20 ENCOUNTER — DOCUMENTATION ONLY (OUTPATIENT)
Dept: SLEEP MEDICINE | Facility: HOSPITAL | Age: 37
End: 2022-06-20
Payer: COMMERCIAL

## 2022-06-21 NOTE — PROGRESS NOTES
STOP LOAN       Name: Rhett Moore MRN# [id number]   Age: 36 year old YOB: 1985     Stop Bang questionnaire completed with a score of >3 to allow for HST     Have you been told you snore loudly (louder than talking or loud enough to be heard through doors)? NO    Do you often feel tired, fatigued, or sleepy during the daytime? YES    Has anyone observed you stop breathing during your sleep? NO    Do you have or are you being treated for high blood pressure? YES    Is your BMI greater than 35? YES    Is your neck size circumference 16 inches or greater? YES    Are you over 50 years old? NO    Stop Bang Score (# of yes): 5

## 2022-06-21 NOTE — PROGRESS NOTES
SLEEP HISTORY QUESTIONNAIRE    Please describe the main reason for your sleep appointment? Ref by doctor    How long has this been a problem? ?    Have you been diagnosed with a sleep problem in the past? NO    If so, what? n/a    What treatment was recommended? n/a    Have you had a sleep study in the past? NO    If yes, where and when? n/a    Sleep Habits:   Do you read in bed? No  Do you eat in bed? No  Do you watch TV in bed? Yes  Do you work in bed? No  Do you use a phone or computer in bed? No    Is you sleep disturbed by:   Bed partner: No  Children: No  Noise: No   Pets: No  Other: NO      On two or more nights per week, do you drink alcohol to help you fall asleep?NO    On two or more nights per week, do you take melatonin to help you fall asleep? NO    On two or more nights per week, do you take over the counter medicine to fall asleep?  NO    Do you take drinks with caffeine (coffee, tea, soda, energy drinks)? NO    Do you have 3 or more caffeine drinks in a day? NO    Do you have caffeine drinks within 6 hours of bedtime? NO    Do you smoke or use tobacco? NO    Do you exercise? NO    Sleep Routine:   Using a 24 Hour Clock    What time do you usually get into bed on workdays? 10 pm    Weekend/non work days? 10 pm    What time do you get out of bed on workdays? 4 am      Weekend/non work days? 8 am    Do you work the evening or night shift or do your shifts rotate? YES    How long does it usually take to fall to sleep? 5 min    How many times do you wake during the night? 3-4    How much time do you feel that you are awake during the entire night? ?    How long does it take for you to fall back to sleep after you wake up? 5 min    Why do you think you wake up? bathroom    What do you do when you wake up? bathroom    How much sleep do you think you get on work nights? 6 hours    How much sleep do you think you get on weekends/non work days? 10 hours    How much sleep do you think you need to feel your  best? 8 hours    How many days during a week do you take a nap on average? 0    What is the average length of your naps? n/a    Do you feel better after taking a nap? DOES NOT APPLY    If you could chose the best sleep schedule for you, what time would you go to bed? 10 pm  What time would you get up? 8 am    Do you read in bed? NO    Do you eat in bed? NO    Do you watch TV in bed? YES    Do you do work in bed? NO    Do you use a computer or phone in bed? NO    Sleep Disruptions?   Leg movements:  Do you ever have restless, crawling, aching or other unusual feelings in your legs? NO    Do you ever wake yourself by kicking your legs during the night? NO    Are the sheets and blankets messed up or tossed about when you get up? NO    Night-time behaviors:   Do you have nightmares or night terrors? NO   How often? n/a    Have you had times when you were sleep walking? NO    Have you been seen doing anything unusual while you sleep at nights? NO  What? n/a  How often? n/a    Have you ever hurt yourself or someone else while you were sleeping? NO  Please describe: n/a    Do you clench or grind your teeth during the night? NO    Sleep Apnea (pauses in breathing during sleep):  Do you wake with a headache in the morning? NO  How often? n/a    Does your bed partner, family or friends ever say that you snore? YES  How many nights per week do you snore? frequent  Can snoring be heard outside the bedroom? NO    Do you ever wake yourself up from snoring, gasping or choking? YES    Have you ever been told that you stop breathing or have pauses in your breathing? NO    Do you wake in the morning with a dry throat or mouth? NO    Do you have trouble breathing through your nose? NO    Do you have problems with heartburn, reflux or a hiatal hernia? NO    Which positions do you usually sleep in? (stomach, back, sides, all) stomach, sides    Do you use oxygen or any other medical equipment when you sleep? NO    Do members of your  family (related by blood) snore? YES    Have any members of your family been diagnosed with with sleep apnea? YES    Do other members of your family have restless leg? NO    Do other members of your family have sleep walking? NO    Have you ever had an accident, or near accident due to sleepiness while driving? NO    Does your sleepiness affect your work on the job or at school? NO    Do you ever fall asleep by accident while doing a task? NO    Have you had sudden muscle weakness when laughing, angry or surprised? NO    Have you ever been unable to move your body when falling asleep or waking up? NO    Do you ever have trouble  your dreams from real life events? NO  Please describe: n/a    Physical Health: (including illness and injury): During the past 30 days, on how many days was your physical health not good? 0/30 days     Mental Health: (including stress, depression, and problems with emotions): During the last 30 days, how may days was your mental health not good? 0/30 days.     During the past 30 days, on how many days did poor physical or mental health keep you from doing your usual activities? This might be self-care, work, or play? 0/30 days.     Social History:   Marital status: single    Who lives in your home with you? brother    Mother (alive or dead)? alive If has , from what? n/a  Father (alive or dead)? alive If has , from what? n/a    Siblings: YES  Have any ? NO  If so, from what? n/a    Currently working? YES  If yes, work: [employer]  Former jobs: ?     Sleepiness Scale:   Sitting and reading 0   Watching TV 1   Sitting in a public place 0   Riding in a car 0   Lying down to rest in the afternoon 0   Sitting and talking to someone 0   Sitting quietly after a lunch without alcohol 0   In a car, stopping for a few minutes in traffic 0       Surgical History:   Past Surgical History:   Procedure Laterality Date     APPENDECTOMY       TUBES         Medical Conditions:   Past  Medical History:   Diagnosis Date     Attention deficit disorder of childhood without me 02/16/2000     Chronic conjunctivitis of both eyes, unspecified chronic conjunctivitis type     Prednisone drops; [facility]     Hypertrichosis of left upper eyelid      Hypertrichosis of right upper eyelid     lashes trimmed     Unspecified acquired hypothyroidism 10/19/2006       Medications:   Current Outpatient Medications   Medication Sig     levothyroxine (SYNTHROID/LEVOTHROID) 150 MCG tablet TAKE 1 TABLET BY MOUTH EVERY DAY     levothyroxine (SYNTHROID/LEVOTHROID) 175 MCG tablet Take 1 tablet (175 mcg) by mouth daily     lisinopril (ZESTRIL) 10 MG tablet Take 1 tablet (10 mg) by mouth daily     No current facility-administered medications for this visit.       Are you currently having any of the following symptoms?   General:   Obvious weight gain or loss NO  Fever, chills or sweats NO  Drug allergies: NO    Eyes:   Changes in vision NO  Blind spots NO  Double vision NO  Other NO    Ear, Nose and Throat:   Ear pain NO  Sore throat NO  Sinus pain NO  Post-nasal drip NO  Runny nose NO  Bloody nose NO    Heart:   Rapid or irregular heart beat NO  Chest pain or pressure NO  Out of breath when lying down NO  Swelling in feet or legs NO  High blood pressure YES  Heart disease NO    Nervous system   Headaches NO  Weakness in arms or legs NO  Numbness in arms of legs NO  Other: NO    Skin  Rashes NO  New moles or skin changes NO  Other NO    Lungs  Shortness of breath at rest NO  Shortness of breath with activity NO  Dry cough NO  Coughing up mucous or phlegm NO  Coughing up blood NO  Wheezing when breathing NO    Lymph System  Swollen lymph nodes NO  New lumps or bumps NO  Changes in breasts or discharge NO    Digestive System   Nausea or vomiting NO  Loose or watery stools NO  Hard, dry stools (constipation) NO  Fat or grease in stools NO  Blood in stools NO  Stools are black or bloody NO  Abdominal (belly) pain NO    Urinary  Tract   Pain when you urinate (pee) NO  Blood in your urine NO  Urinate (pee) more than normal NO  Irregular periods NO    Muscles and bones   Muscle pain NO  Joint or bone pain NO  Swollen joints NO  Other NO    Glands  Increased thirst or urination NO  Diabetes NO  Morning glucose: n/a  Afternoon glucose: n/a    Mental Health  Depression NO  Anxiety NO  Other mental health issues: NO

## 2022-06-21 NOTE — PROGRESS NOTES
"Chart review prior to sleep testing.    Patient Summary:  36 year old yo male who is referred for concern for sleep-disordered breathing.    Patient Active Problem List    Diagnosis Date Noted     Pulmonary blastomycosis (H) 03/18/2020     Priority: Medium     CAP (community acquired pneumonia) 03/05/2020     Priority: Medium     Pneumonia due to drug resistant Streptococcus pneumoniae (H) 02/19/2020     Priority: Medium     Pneumonia of lower lobe due to infectious organism, unspecified laterality 02/19/2020     Priority: Medium     Essential hypertension 05/20/2019     Priority: Medium     Morbid obesity (H) 04/11/2019     Priority: Medium     Hypothyroidism, unspecified type 04/11/2019     Priority: Medium       Current Outpatient Medications   Medication     levothyroxine (SYNTHROID/LEVOTHROID) 150 MCG tablet     levothyroxine (SYNTHROID/LEVOTHROID) 175 MCG tablet     lisinopril (ZESTRIL) 10 MG tablet     No current facility-administered medications for this visit.       Pertinent PMHx of HTN, severe obesity.    Concerns for sleep-disordered breathing raised by Dr. Farrell given snoring.    STOP-BANG score of 5, with unknown neck circumference.  [place] score of 1.  BMI of Estimated body mass index is 56.12 kg/m  as calculated from the following:    Height as of 2/16/22: 1.753 m (5' 9\").    Weight as of 2/16/22: 172.4 kg (380 lb).     Per questionnaire: \"Referred by doctor\"    Caffeine use:  No for 3+ per day.  No for within 6 hours of bed.    Tobacco use: No    Sleep pattern:  Workdays.  10pm - 4am, total sleep time 6 hours.  Weekends.  10pm - 8am, total sleep time 10 hours.  Time to fall asleep: ~5 minutes.  Awakenings: 3-4 times per night, 5 minutes to return to sleep, awake for total of ? hours per night.  Napping.  0 days per week, - hours per nap.    No for RLS screen.  No for sleep walking.  No for dream enactment behavior.  No for bruxism.    No for morning headaches.  Yes for snoring.  Yes for observed " apnea.  No for FHx of HARITHA.    SHx:  Single, lives with brother.  Works for [employer].    A/P:  1.)  High likelihood of HARITHA with STOP-BANG score of 5.  2.)  Increased potential for hypoventilation given BMI ~56  - Recommend in-lab PSG with end-tidal capnography and pre-study VBG.      ---  This note was written with the assistance of the Dragon voice-dictation technology software. The final document, although reviewed, may contain errors. For corrections, please contact the office.    Eulogio Perera MD    Sleep Medicine  [facility] [place], [place]  ([phone])  [facility] [place], [place]  ([phone])  [facility] [place], [place] ([phone])

## 2022-08-29 DIAGNOSIS — E03.9 HYPOTHYROIDISM, UNSPECIFIED TYPE: ICD-10-CM

## 2022-08-30 RX ORDER — LEVOTHYROXINE SODIUM 175 UG/1
TABLET ORAL
Qty: 90 TABLET | Refills: 0 | Status: SHIPPED | OUTPATIENT
Start: 2022-08-30 | End: 2022-12-05

## 2022-08-30 NOTE — TELEPHONE ENCOUNTER
LEVOTHYROXINE 175 MCG TABLET  Last Written Prescription Date:  6/1/2022  Last Fill Quantity: 90,   # refills: 0  Last Office Visit: 2/16/2022  Future Office visit:    Next 5 appointments (look out 90 days)    Sep 19, 2022  8:10 AM  (Arrive by 7:55 AM)  Nurse Only with HC COLLECTION  [facility] [place] ([facility] [place] ) [address]  [place] [address]  [phone]           Routing refill request to provider for review/approval because:

## 2022-09-06 DIAGNOSIS — G47.33 OSA (OBSTRUCTIVE SLEEP APNEA): Primary | ICD-10-CM

## 2022-09-21 ENCOUNTER — THERAPY VISIT (OUTPATIENT)
Dept: SLEEP MEDICINE | Facility: HOSPITAL | Age: 37
End: 2022-09-21
Attending: FAMILY MEDICINE
Payer: COMMERCIAL

## 2022-09-21 DIAGNOSIS — G47.33 OSA (OBSTRUCTIVE SLEEP APNEA): ICD-10-CM

## 2022-09-21 LAB
BASE EXCESS BLDV CALC-SCNC: -0.7 MMOL/L (ref -7.7–1.9)
HCO3 BLDV-SCNC: 24 MMOL/L (ref 21–28)
O2/TOTAL GAS SETTING VFR VENT: 21 %
OXYHGB MFR BLDV: 92 % (ref 70–75)
PCO2 BLDV: 41 MM HG (ref 40–50)
PH BLDV: 7.38 [PH] (ref 7.32–7.43)
PO2 BLDV: 66 MM HG (ref 25–47)

## 2022-09-21 PROCEDURE — 95811 POLYSOM 6/>YRS CPAP 4/> PARM: CPT | Mod: 26 | Performed by: FAMILY MEDICINE

## 2022-09-21 PROCEDURE — 82805 BLOOD GASES W/O2 SATURATION: CPT

## 2022-09-21 PROCEDURE — 36415 COLL VENOUS BLD VENIPUNCTURE: CPT

## 2022-09-21 PROCEDURE — 95811 POLYSOM 6/>YRS CPAP 4/> PARM: CPT

## 2022-09-22 NOTE — PROGRESS NOTES
The patient presents to the sleep center with complaints of sleep disordered breathing. The patient's AHI was 86.6 without treatment, and 27.2 on treatment, the lowest O2 saturation was 68.3%, with the time below 89% being 16.3 minutes. ETCO2 20-51, PH-7.38, PCO2-41, PO2-66, Bicarb-24. A sleep aide was taken at 21:46. Normal Sinus Rhythm was observed. The patient was fitted with a Resmed Airfit F20 Large mask. 2 liters of oxygen were started due to desaturations, the patient was then switched to cpap, starting at 5cmh20, and was titrated up to a pressure of [id number]. There were minimal periodic limb movements. All stages of sleep were observed. The tech noted mild to moderate snoring. The patient tolerated the test and treatment well, due to facial hair there was high leak, and patient couldn't tolerate nasal mask.

## 2022-09-23 NOTE — PROCEDURES
"SLEEP STUDY INTERPRETATION  SPLIT NIGHT STUDY      Patient: TETO AVALOS  YOB: 1985  Study Date: 9/21/2022  MRN: [id number]  Referring Provider: Noelle Farrell MD  Ordering Provider: Eulogio Perera MD    Indications for Polysomnography: The patient is a 36 year old Male who is 5' 9\" and weighs 380.0 lbs. His BMI is 56.3, [place] sleepiness scale 1 and neck circumference is - cm. Relevant medical history includes HTN, severe obesity. A diagnostic polysomnogram was performed to evaluate for sleep apnea, hypoventilation, hypoxemia. After 124.0 minutes of sleep time the patient exhibited sufficient respiratory events qualifying him for a CPAP trial which was then initiated.    Polysomnogram Data: A full night polysomnogram recorded the standard physiologic parameters including EEG, EOG, EMG, ECG, nasal and oral airflow. Respiratory parameters of chest and abdominal movements were recorded with respiratory inductance plethysmography. Oxygen saturation was recorded by pulse oximetry.  Hypopnea scoring rule used: 1B 4%    Diagnostic PSG  Sleep Architecture: Delayed sleep latency, no clear REM observed.  Highly elevated arousal index.  The total recording time of the polysomnogram was 245.0 minutes. The total sleep time was 124.0 minutes. Sleep latency was increased at 106.0 minutes without the use of a sleep aid. REM latency was - minutes. Arousal index was increased at 100.6 arousals per hour. Sleep efficiency was decreased at 50.6%. Wake after sleep onset was 15.0 minutes. The patient spent 17.3% of total sleep time in Stage N1, 64.1% in Stage N2, 18.5% in Stage N3, and 0.0% in REM. Time in REM supine was - minutes.    Respiration: Severe HARITHA (AHI 86.6) with significant sleep-associated hypoxemia, severe desaturations (thom SpO2 68.3%) and sustained hypoxemia in mid-70 s.  End-tidal capnography was not suggestive of acute hypoventilation.  Pre-study VBG was WNL (pH 7.38, pCO2 41, HCO3 24).  " Potential that severity under-reported given no REM and no supine sleep observed.    Events ? The polysomnogram revealed a presence of 176 obstructive, - central, and 1 mixed apneas resulting in an apnea index of 85.6 events per hour. There were - obstructive hypopneas and - central hypopneas resulting in an obstructive hypopnea index of - and central hypopnea index of - events per hour. The combined apnea/hypopnea index was 86.6 events per hour (central apnea/hypopnea index was - events per hour).  The REM AHI was - events per hour. The supine AHI was - events per hour. The RERA index was - events per hour. The RDI was 86.6 events per hour.    Snoring - was reported as mild to moderate.    Respiratory rate and pattern - was notable for normal respiratory rate and pattern.    Sustained Sleep Associated Hypoventilation - End-tidal carbon dioxide monitoring was used, however significant hypoventilation was not present < 1 minutes at or greater than 55 mmHg.    Sleep Associated Hypoxemia - (Greater than 5 minutes O2 sat at or below 88%) was present. Baseline oxygen saturation was 95.7%. Lowest oxygen saturation was 68.3%. Time spent less than or equal to 88% was 14.7 minutes. Time spent less than or equal to 89% was 16.3 minutes.     Treatment PSG  Sleep Architecture: Improved arousal index, improved sleep efficiency, supine REM was observed.  At 01:51:32 AM the patient was placed on PAP treatment and was titrated at pressures ranging from CPAP 5 cmH2O up to CPAP 13 cmH2O. The total recording time of the treatment portion of the study was 240.1 minutes. The total sleep time was 209.5 minutes. During the treatment portion of the study the sleep latency was 14.0 minutes. REM latency was 111.5 minutes. Arousal index was improved at 37.2 arousals per hour. Sleep efficiency was normal at 87.3%. Wake after sleep onset was 17.0 minutes. The patient spent 15.0% of total sleep time in Stage N1, 46.8% in Stage N2, 23.4% in Stage  N3, and 14.8% in REM. Time in REM supine was 31.0 minutes.     Respiration: CPAP at 12-13 cm H2O appeared effective in supine NREM, but with residual obstructive events in supine REM on 10-11 cm H2O.  Efficacy potential affected by ongoing high mask leak, presumed related to facial hair and patient did not tolerate nasal mask.    The final pressure was CPAP 13 cmH2O with an AHI of 7.0 events per hour. Time in REM supine on final pressure was - minutes.     This titration was considered incomplete (does not meet any of the above criteria).    Movement Activity: Frequent PLM s with arousals during diagnostic portion, though difficult to assess given frequent obstructive events at this time.    Periodic Limb Movements  o During the diagnostic portion of the study, there were 85 PLMs recorded. The PLM index was 41.1 movements per hour. The PLM Arousal Index was 39.2 per hour.  o During the treatment portion of the study, there were 8 PLMs recorded. The PLM index was 2.3 movements per hour. The PLM Arousal Index was 2.0 per hour.    REM EMG Activity - Excessive transient/sustained muscle activity was not present.    Nocturnal Behavior - Abnormal sleep related behaviors were not noted during/arising out of NREM / REM sleep.     Bruxism - None apparent.    Cardiac Summary: Appears NSR  During the diagnostic portion of the study, the average pulse rate was 73.6 bpm. The minimum pulse rate was 58.0 bpm while the maximum pulse rate was 114.0 bpm.    During the treatment portion of the study, the average pulse rate was 62.9 bpm. The minimum pulse rate was 50.0 bpm while the maximum pulse rate was 93.0 bpm.     Assessment:     During diagnostic portion, delayed sleep latency, no clear REM observed.  Highly elevated arousal index.    Severe HARITHA (AHI 86.6) with significant sleep-associated hypoxemia, severe desaturations (thom SpO2 68.3%) and sustained hypoxemia in mid-70 s.  End-tidal capnography was not suggestive of acute  hypoventilation.  Pre-study VBG was WNL (pH 7.38, pCO2 41, HCO3 24).  Potential that severity under-reported given no REM and no supine sleep observed.    CPAP at 12-13 cm H2O appeared effective in supine NREM, but with residual obstructive events in supine REM on 10-11 cm H2O.  Efficacy potential affected by ongoing high mask leak, presumed related to facial hair and patient did not tolerate nasal mask.    Recommendations:    Consider all-night PAP titration PSG.    Treatment of HARITHA with Auto?titrating PAP therapy with a range of 12 cmH2O to 15 cmH2O. Recommend clinical follow up with sleep management team, including review of compliance measures.    Advice regarding the risks of drowsy driving.    Suggest optimizing sleep schedule and avoiding sleep deprivation.    Weight management (if BMI > 30).    Pharmacologic therapy should be used for management of restless legs syndrome only if present and clinically indicated and not based on the presence of periodic limb movements alone.    Diagnostic Codes:   Obstructive Sleep Apnea G47.33  Sleep Hypoxemia/Hypoventilation G47.36   Periodic Limb Movement Disorder G47.61     _____________________________________   Electronically Signed By: Eulogio Perera MD (9/23/2022)

## 2022-09-25 NOTE — PROGRESS NOTES
Rhett Moore is a 37 year old male who is being evaluated via a billable telephone visit.       What phone number would you like to be contacted at?@ [phone]  Home Phone [phone]   Work Phone Not on file.   Mobile [phone]   Home Phone Not on file.       How would you like to obtain your AVS? [employer]       Telephone Virtual Visit Details     Type of service:  Telephone Virtual Visit     Start Time: 0800  End Time: 0830    Virtual visit for review of PSG results.     Assessment:     During diagnostic portion, delayed sleep latency, no clear REM observed.  Highly elevated arousal index.    Severe HARITHA (AHI 86.6) with significant sleep-associated hypoxemia, severe desaturations (thom SpO2 68.3%) and sustained hypoxemia in mid-70 s.  End-tidal capnography was not suggestive of acute hypoventilation.  Pre-study VBG was WNL (pH 7.38, pCO2 41, HCO3 24).  Potential that severity under-reported given no REM and no supine sleep observed.    CPAP at 12-13 cm H2O appeared effective in supine NREM, but with residual obstructive events in supine REM on 10-11 cm H2O.  Efficacy potential affected by ongoing high mask leak, presumed related to facial hair and patient did not tolerate nasal mask.     Recommendations:    Consider all-night PAP titration PSG.    Treatment of HARITHA with Auto?titrating PAP therapy with a range of 12 cmH2O to 15 cmH2O. Recommend clinical follow up with sleep management team, including review of compliance measures.    Plan:    We agreed to start CPAP auto-titrate 12-15 cm H2O.    Follow-up in 1 month, if AHI < 10, recommend overnight oximetry on CPAP.    SUBJECTIVE:  Rhett Moore is a 37 year old male.    37 year old yo male who is referred for concern for sleep-disordered breathing.    Pertinent PMHx of HTN, severe obesity.     Concerns for sleep-disordered breathing raised by Dr. Farrell given snoring.     Today -we reviewed his sleep study results in detail.    STOP-BANG score of 5,  "with unknown neck circumference.  Orlando score of 1.  BMI of Estimated body mass index is 56.12 kg/m  as calculated from the following:    Height as of 2/16/22: 1.753 m (5' 9\").    Weight as of 2/16/22: 172.4 kg (380 lb).      Per questionnaire: \"Referred by doctor\"     Caffeine use:  No for 3+ per day.  No for within 6 hours of bed.     Tobacco use: No     Sleep pattern:  Workdays.  10pm - 4am, total sleep time 6 hours.  Weekends.  10pm - 8am, total sleep time 10 hours.  Time to fall asleep: ~5 minutes.  Awakenings: 3-4 times per night, 5 minutes to return to sleep, awake for total of ? hours per night.  Napping.  0 days per week, - hours per nap.     No for RLS screen.  No for sleep walking.  No for dream enactment behavior.  No for bruxism.     No for morning headaches.  Yes for snoring.  Yes for observed apnea.  Yes for FHx of HARITHA - mom, maternal uncle     SHx:  Single, lives with brother.  Works for [employer].    SLEEP STUDY INTERPRETATION  SPLIT NIGHT STUDY        Patient: TETO AVALOS  YOB: 1985  Study Date: 9/21/2022  MRN: [id number]  Referring Provider: Noelle Farrell MD  Ordering Provider: Eulogio Perera MD     Indications for Polysomnography: The patient is a 36 year old Male who is 5' 9\" and weighs 380.0 lbs. His BMI is 56.3, Orlando sleepiness scale 1 and neck circumference is - cm. Relevant medical history includes HTN, severe obesity. A diagnostic polysomnogram was performed to evaluate for sleep apnea, hypoventilation, hypoxemia. After 124.0 minutes of sleep time the patient exhibited sufficient respiratory events qualifying him for a CPAP trial which was then initiated.     Polysomnogram Data: A full night polysomnogram recorded the standard physiologic parameters including EEG, EOG, EMG, ECG, nasal and oral airflow. Respiratory parameters of chest and abdominal movements were recorded with respiratory inductance plethysmography. Oxygen saturation was recorded by pulse " oximetry.  Hypopnea scoring rule used: 1B 4%     Diagnostic PSG  Sleep Architecture: Delayed sleep latency, no clear REM observed.  Highly elevated arousal index.  The total recording time of the polysomnogram was 245.0 minutes. The total sleep time was 124.0 minutes. Sleep latency was increased at 106.0 minutes without the use of a sleep aid. REM latency was - minutes. Arousal index was increased at 100.6 arousals per hour. Sleep efficiency was decreased at 50.6%. Wake after sleep onset was 15.0 minutes. The patient spent 17.3% of total sleep time in Stage N1, 64.1% in Stage N2, 18.5% in Stage N3, and 0.0% in REM. Time in REM supine was - minutes.     Respiration: Severe HARITHA (AHI 86.6) with significant sleep-associated hypoxemia, severe desaturations (thom SpO2 68.3%) and sustained hypoxemia in mid-70 s.  End-tidal capnography was not suggestive of acute hypoventilation.  Pre-study VBG was WNL (pH 7.38, pCO2 41, HCO3 24).  Potential that severity under-reported given no REM and no supine sleep observed.    Events ? The polysomnogram revealed a presence of 176 obstructive, - central, and 1 mixed apneas resulting in an apnea index of 85.6 events per hour. There were - obstructive hypopneas and - central hypopneas resulting in an obstructive hypopnea index of - and central hypopnea index of - events per hour. The combined apnea/hypopnea index was 86.6 events per hour (central apnea/hypopnea index was - events per hour).  The REM AHI was - events per hour. The supine AHI was - events per hour. The RERA index was - events per hour. The RDI was 86.6 events per hour.    Snoring - was reported as mild to moderate.    Respiratory rate and pattern - was notable for normal respiratory rate and pattern.    Sustained Sleep Associated Hypoventilation - End-tidal carbon dioxide monitoring was used, however significant hypoventilation was not present < 1 minutes at or greater than 55 mmHg.    Sleep Associated Hypoxemia - (Greater  than 5 minutes O2 sat at or below 88%) was present. Baseline oxygen saturation was 95.7%. Lowest oxygen saturation was 68.3%. Time spent less than or equal to 88% was 14.7 minutes. Time spent less than or equal to 89% was 16.3 minutes.      Treatment PSG  Sleep Architecture: Improved arousal index, improved sleep efficiency, supine REM was observed.  At 01:51:32 AM the patient was placed on PAP treatment and was titrated at pressures ranging from CPAP 5 cmH2O up to CPAP 13 cmH2O. The total recording time of the treatment portion of the study was 240.1 minutes. The total sleep time was 209.5 minutes. During the treatment portion of the study the sleep latency was 14.0 minutes. REM latency was 111.5 minutes. Arousal index was improved at 37.2 arousals per hour. Sleep efficiency was normal at 87.3%. Wake after sleep onset was 17.0 minutes. The patient spent 15.0% of total sleep time in Stage N1, 46.8% in Stage N2, 23.4% in Stage N3, and 14.8% in REM. Time in REM supine was 31.0 minutes.      Respiration: CPAP at 12-13 cm H2O appeared effective in supine NREM, but with residual obstructive events in supine REM on 10-11 cm H2O.  Efficacy potential affected by ongoing high mask leak, presumed related to facial hair and patient did not tolerate nasal mask.    The final pressure was CPAP 13 cmH2O with an AHI of 7.0 events per hour. Time in REM supine on final pressure was - minutes.     This titration was considered incomplete (does not meet any of the above criteria).     Movement Activity: Frequent PLM s with arousals during diagnostic portion, though difficult to assess given frequent obstructive events at this time.    Periodic Limb Movements  ? During the diagnostic portion of the study, there were 85 PLMs recorded. The PLM index was 41.1 movements per hour. The PLM Arousal Index was 39.2 per hour.  ? During the treatment portion of the study, there were 8 PLMs recorded. The PLM index was 2.3 movements per hour. The  PLM Arousal Index was 2.0 per hour.    REM EMG Activity - Excessive transient/sustained muscle activity was not present.    Nocturnal Behavior - Abnormal sleep related behaviors were not noted during/arising out of NREM / REM sleep.     Bruxism - None apparent.     Cardiac Summary: Appears NSR  During the diagnostic portion of the study, the average pulse rate was 73.6 bpm. The minimum pulse rate was 58.0 bpm while the maximum pulse rate was 114.0 bpm.     During the treatment portion of the study, the average pulse rate was 62.9 bpm. The minimum pulse rate was 50.0 bpm while the maximum pulse rate was 93.0 bpm.      Assessment:     During diagnostic portion, delayed sleep latency, no clear REM observed.  Highly elevated arousal index.    Severe HARITHA (AHI 86.6) with significant sleep-associated hypoxemia, severe desaturations (thom SpO2 68.3%) and sustained hypoxemia in mid-70 s.  End-tidal capnography was not suggestive of acute hypoventilation.  Pre-study VBG was WNL (pH 7.38, pCO2 41, HCO3 24).  Potential that severity under-reported given no REM and no supine sleep observed.    CPAP at 12-13 cm H2O appeared effective in supine NREM, but with residual obstructive events in supine REM on 10-11 cm H2O.  Efficacy potential affected by ongoing high mask leak, presumed related to facial hair and patient did not tolerate nasal mask.     Recommendations:    Consider all-night PAP titration PSG.    Treatment of HARITHA with Auto?titrating PAP therapy with a range of 12 cmH2O to 15 cmH2O. Recommend clinical follow up with sleep management team, including review of compliance measures.    Advice regarding the risks of drowsy driving.    Suggest optimizing sleep schedule and avoiding sleep deprivation.    Weight management (if BMI > 30).    Pharmacologic therapy should be used for management of restless legs syndrome only if present and clinically indicated and not based on the presence of periodic limb movements  alone.     Diagnostic Codes:   Obstructive Sleep Apnea G47.33  Sleep Hypoxemia/Hypoventilation G47.36   Periodic Limb Movement Disorder G47.61     _____________________________________   Electronically Signed By: Eulogio Perera MD (9/23/2022)         Past medical history:    Patient Active Problem List    Diagnosis Date Noted     Pulmonary blastomycosis (H) 03/18/2020     Priority: Medium     CAP (community acquired pneumonia) 03/05/2020     Priority: Medium     Pneumonia due to drug resistant Streptococcus pneumoniae (H) 02/19/2020     Priority: Medium     Pneumonia of lower lobe due to infectious organism, unspecified laterality 02/19/2020     Priority: Medium     Essential hypertension 05/20/2019     Priority: Medium     Morbid obesity (H) 04/11/2019     Priority: Medium     Hypothyroidism, unspecified type 04/11/2019     Priority: Medium       10 point ROS of systems including Constitutional, Eyes, Respiratory, Cardiovascular, Gastroenterology, Genitourinary, Integumentary, Muscularskeletal, Psychiatric were all negative except for pertinent positives noted in my HPI.    Current Outpatient Medications   Medication Sig Dispense Refill     levothyroxine (SYNTHROID/LEVOTHROID) 150 MCG tablet TAKE 1 TABLET BY MOUTH EVERY DAY 30 tablet 0     levothyroxine (SYNTHROID/LEVOTHROID) 175 MCG tablet TAKE 1 TABLET BY MOUTH EVERY DAY 90 tablet 0     lisinopril (ZESTRIL) 10 MG tablet Take 1 tablet (10 mg) by mouth daily 90 tablet 3       OBJECTIVE:  There were no vitals taken for this visit.    Physical Exam:  healthy, alert and no distress  PSYCH: Alert and oriented times 3; coherent speech, normal   rate and volume, able to articulate logical thoughts, able   to abstract reason, no tangential thoughts, no hallucinations   or delusions  His affect is normal  RESP: No cough, no audible wheezing, able to talk in full sentences  Remainder of exam unable to be completed due to telephone visits    ---  This note was written  with the assistance of the Dragon voice-dictation technology software. The final document, although reviewed, may contain errors. For corrections, please contact the office.    Eulogio Perera MD    Sleep Medicine    [place], [place]  o Main Office: [phone]    [facility] [place], [place] ([phone])  o Schedule visits: [phone]  o Main Office: [phone]    Time spent on the date of service:  30 minutes.

## 2022-09-27 ENCOUNTER — VIRTUAL VISIT (OUTPATIENT)
Dept: PULMONOLOGY | Facility: OTHER | Age: 37
End: 2022-09-27
Attending: FAMILY MEDICINE
Payer: COMMERCIAL

## 2022-09-27 VITALS — BODY MASS INDEX: 46.65 KG/M2 | WEIGHT: 315 LBS | HEIGHT: 69 IN

## 2022-09-27 DIAGNOSIS — G47.33 OSA (OBSTRUCTIVE SLEEP APNEA): Primary | ICD-10-CM

## 2022-09-27 DIAGNOSIS — I10 ESSENTIAL HYPERTENSION: ICD-10-CM

## 2022-09-27 PROCEDURE — 99203 OFFICE O/P NEW LOW 30 MIN: CPT | Mod: 95 | Performed by: FAMILY MEDICINE

## 2022-09-27 ASSESSMENT — SLEEP AND FATIGUE QUESTIONNAIRES
HOW LIKELY ARE YOU TO NOD OFF OR FALL ASLEEP WHILE SITTING AND TALKING TO SOMEONE: WOULD NEVER DOZE
HOW LIKELY ARE YOU TO NOD OFF OR FALL ASLEEP WHILE SITTING AND READING: MODERATE CHANCE OF DOZING
HOW LIKELY ARE YOU TO NOD OFF OR FALL ASLEEP WHILE SITTING QUIETLY AFTER LUNCH WITHOUT ALCOHOL: SLIGHT CHANCE OF DOZING
HOW LIKELY ARE YOU TO NOD OFF OR FALL ASLEEP IN A CAR, WHILE STOPPED FOR A FEW MINUTES IN TRAFFIC: WOULD NEVER DOZE
HOW LIKELY ARE YOU TO NOD OFF OR FALL ASLEEP WHILE LYING DOWN TO REST IN THE AFTERNOON WHEN CIRCUMSTANCES PERMIT: HIGH CHANCE OF DOZING
HOW LIKELY ARE YOU TO NOD OFF OR FALL ASLEEP WHILE SITTING INACTIVE IN A PUBLIC PLACE: WOULD NEVER DOZE
HOW LIKELY ARE YOU TO NOD OFF OR FALL ASLEEP WHEN YOU ARE A PASSENGER IN A CAR FOR AN HOUR WITHOUT A BREAK: SLIGHT CHANCE OF DOZING
HOW LIKELY ARE YOU TO NOD OFF OR FALL ASLEEP WHILE WATCHING TV: SLIGHT CHANCE OF DOZING

## 2022-09-27 NOTE — PROGRESS NOTES
"Rhett Moore is a 37 year old male being evaluated via a billable telephone visit.     \"This telephone visit will be conducted via a call between you and your physician/provider. We have found that certain health care needs can be provided without the need for an in-person visit or physical exam.  This service lets us provide the care you need with a telephone conversation.  If a prescription is necessary we can send it directly to your pharmacy.  If lab work is needed we can place an order for that and you can then stop by our lab to have the test done at a later time.\"    Telephone visits are billed at different rates depending on your insurance coverage.  Please reach out to your insurance provider with any questions.    Patient has given verbal consent for  a Telephone visit? Yes    What telephone number would you like your provider to contact at at:  [phone]    How would you like to obtain your AVS? Raciel Morataya    Telephone Visit Details:     Telephone Visit Start Time: {telephone visit start/end time for provider to select:302120}    Telephone Visit End Time:{telephone visit start/end time for provider to select:498994}      "

## 2022-09-28 LAB — SLPCOMP: NORMAL

## 2022-10-20 ENCOUNTER — DOCUMENTATION ONLY (OUTPATIENT)
Dept: HOME HEALTH SERVICES | Facility: CLINIC | Age: 37
End: 2022-10-20

## 2022-10-20 NOTE — PROGRESS NOTES
Patient was offered choice of vendor and chose [facility].  Patient Rhett Moore was set up at [employer] [facility] on October 19, 2022. Patient received a Resmed Airsense 11 Pressures were set at 12-15 cm H2O.   Patient s ramp is 6 cm H2O for Auto and FLEX/EPR is 2.  Patient received a Resmed Mask name: [id number]  Nasal mask size Medium, heated tubing and heated humidifier.  Patient does need to meet compliance. Patient has a follow up on 11/21/2022 with Dr. Perera.    earline Carrera    No adenopathy or splenomegaly. No cervical or inguinal lymphadenopathy.

## 2022-11-20 NOTE — PROGRESS NOTES
Rhett Moore is a 37 year old male who is being evaluated via a billable telephone visit.       What phone number would you like to be contacted at?@ [phone]  Home Phone [phone]   Work Phone Not on file.   Mobile [phone]   Home Phone Not on file.       How would you like to obtain your AVS? [email or web address]       Telephone Virtual Visit Details     Type of service:  Telephone Virtual Visit     Start Time: 1:30pm  End Time: 1:40pm    Virtual visit for CPAP compliance follow-up for severe obstructive sleep apnea managed with CPAP.     Assessment:     During diagnostic portion, delayed sleep latency, no clear REM observed.  Highly elevated arousal index.    Severe HARITHA (AHI 86.6) with significant sleep-associated hypoxemia, severe desaturations (thom SpO2 68.3%) and sustained hypoxemia in mid-70 s.  End-tidal capnography was not suggestive of acute hypoventilation.  Pre-study VBG was WNL (pH 7.38, pCO2 41, HCO3 24).  Potential that severity under-reported given no REM and no supine sleep observed.    CPAP at 12-13 cm H2O appeared effective in supine NREM, but with residual obstructive events in supine REM on 10-11 cm H2O.  Efficacy potential affected by ongoing high mask leak, presumed related to facial hair and patient did not tolerate nasal mask.     Plan:    Appears very well controlled with excellent compliance on CPAP auto-titrate 12-15 cm H2O.    Plan proceed with watch pat home sleep testing to be used while wearing CPAP to rule out and evaluate for residual hypoxemia.    Otherwise, plan for follow-up in 1 year and continue CPAP on current settings.    SUBJECTIVE:  Rhett Moore is a 37 year old male.    Pertinent PMHx of HTN, severe obesity.     Prior Sleep Testin2022 - PSG with weight 380 lbs, BMI 56.3.  Severe HARITHA (AHI 86.6) with significant sleep-associated hypoxemia, severe desaturations (thom SpO2 68.3%) and sustained hypoxemia in mid-70 s.  End-tidal capnography was not suggestive of  "acute hypoventilation.  Pre-study VBG was WNL (pH 7.38, pCO2 41, HCO3 24).  Potential that severity under-reported given no REM and no supine sleep observed.  CPAP at 12-13 cm H2O appeared effective in supine NREM, but with residual obstructive events in supine REM on 10-11 cm H2O.  Efficacy potential affected by ongoing high mask leak, presumed related to facial hair and patient did not tolerate nasal mask.     9/27/2022 -we reviewed his sleep study results in detail.  Plan to start CPAP auto-titrate 12-15 cm H2O.    Today -overall, he is an incredibly well and really has no concerns today.  He is states he actually enjoys using it, feels well rested..    CPAP download reviewed from 10/22/2022 - 11/20/2022 on auto-titrate 12-15 cm H2O.  Used 30/30 days, average usage 6 hours 47 minutes.  Leak median 1 L/min and 95th%ile 9 L/min.  AHI 0.1.        Past medical history:    Patient Active Problem List    Diagnosis Date Noted     HARITHA (obstructive sleep apnea) 09/27/2022     Priority: Medium     SLEEP STUDY INTERPRETATION  SPLIT NIGHT STUDY        Patient: TETO AVALOS  YOB: 1985  Study Date: 9/21/2022  MRN: [id number]  Referring Provider: Noelle Farrell MD  Ordering Provider: Eulogio Perera MD     Indications for Polysomnography: The patient is a 36 year old Male who is 5' 9\" and weighs 380.0 lbs. His BMI is 56.3, [place] sleepiness scale 1 and neck circumference is - cm. Relevant medical history includes HTN, severe obesity. A diagnostic polysomnogram was performed to evaluate for sleep apnea, hypoventilation, hypoxemia. After 124.0 minutes of sleep time the patient exhibited sufficient respiratory events qualifying him for a CPAP trial which was then initiated.     Polysomnogram Data: A full night polysomnogram recorded the standard physiologic parameters including EEG, EOG, EMG, ECG, nasal and oral airflow. Respiratory parameters of chest and abdominal movements were recorded with respiratory " inductance plethysmography. Oxygen saturation was recorded by pulse oximetry.  Hypopnea scoring rule used: 1B 4%     Diagnostic PSG  Sleep Architecture: Delayed sleep latency, no clear REM observed.  Highly elevated arousal index.  The total recording time of the polysomnogram was 245.0 minutes. The total sleep time was 124.0 minutes. Sleep latency was increased at 106.0 minutes without the use of a sleep aid. REM latency was - minutes. Arousal index was increased at 100.6 arousals per hour. Sleep efficiency was decreased at 50.6%. Wake after sleep onset was 15.0 minutes. The patient spent 17.3% of total sleep time in Stage N1, 64.1% in Stage N2, 18.5% in Stage N3, and 0.0% in REM. Time in REM supine was - minutes.     Respiration: Severe HARITHA (AHI 86.6) with significant sleep-associated hypoxemia, severe desaturations (thom SpO2 68.3%) and sustained hypoxemia in mid-70 s.  End-tidal capnography was not suggestive of acute hypoventilation.  Pre-study VBG was WNL (pH 7.38, pCO2 41, HCO3 24).  Potential that severity under-reported given no REM and no supine sleep observed.    Events ? The polysomnogram revealed a presence of 176 obstructive, - central, and 1 mixed apneas resulting in an apnea index of 85.6 events per hour. There were - obstructive hypopneas and - central hypopneas resulting in an obstructive hypopnea index of - and central hypopnea index of - events per hour. The combined apnea/hypopnea index was 86.6 events per hour (central apnea/hypopnea index was - events per hour).  The REM AHI was - events per hour. The supine AHI was - events per hour. The RERA index was - events per hour. The RDI was 86.6 events per hour.    Snoring - was reported as mild to moderate.    Respiratory rate and pattern - was notable for normal respiratory rate and pattern.    Sustained Sleep Associated Hypoventilation - End-tidal carbon dioxide monitoring was used, however significant hypoventilation was not present < 1 minutes  at or greater than 55 mmHg.    Sleep Associated Hypoxemia - (Greater than 5 minutes O2 sat at or below 88%) was present. Baseline oxygen saturation was 95.7%. Lowest oxygen saturation was 68.3%. Time spent less than or equal to 88% was 14.7 minutes. Time spent less than or equal to 89% was 16.3 minutes.      Treatment PSG  Sleep Architecture: Improved arousal index, improved sleep efficiency, supine REM was observed.  At 01:51:32 AM the patient was placed on PAP treatment and was titrated at pressures ranging from CPAP 5 cmH2O up to CPAP 13 cmH2O. The total recording time of the treatment portion of the study was 240.1 minutes. The total sleep time was 209.5 minutes. During the treatment portion of the study the sleep latency was 14.0 minutes. REM latency was 111.5 minutes. Arousal index was improved at 37.2 arousals per hour. Sleep efficiency was normal at 87.3%. Wake after sleep onset was 17.0 minutes. The patient spent 15.0% of total sleep time in Stage N1, 46.8% in Stage N2, 23.4% in Stage N3, and 14.8% in REM. Time in REM supine was 31.0 minutes.      Respiration: CPAP at 12-13 cm H2O appeared effective in supine NREM, but with residual obstructive events in supine REM on 10-11 cm H2O.  Efficacy potential affected by ongoing high mask leak, presumed related to facial hair and patient did not tolerate nasal mask.    The final pressure was CPAP 13 cmH2O with an AHI of 7.0 events per hour. Time in REM supine on final pressure was - minutes.     This titration was considered incomplete (does not meet any of the above criteria).     Movement Activity: Frequent PLM s with arousals during diagnostic portion, though difficult to assess given frequent obstructive events at this time.    Periodic Limb Movements  ? During the diagnostic portion of the study, there were 85 PLMs recorded. The PLM index was 41.1 movements per hour. The PLM Arousal Index was 39.2 per hour.  ? During the treatment portion of the study, there  were 8 PLMs recorded. The PLM index was 2.3 movements per hour. The PLM Arousal Index was 2.0 per hour.    REM EMG Activity - Excessive transient/sustained muscle activity was not present.    Nocturnal Behavior - Abnormal sleep related behaviors were not noted during/arising out of NREM / REM sleep.     Bruxism - None apparent.     Cardiac Summary: Appears NSR  During the diagnostic portion of the study, the average pulse rate was 73.6 bpm. The minimum pulse rate was 58.0 bpm while the maximum pulse rate was 114.0 bpm.     During the treatment portion of the study, the average pulse rate was 62.9 bpm. The minimum pulse rate was 50.0 bpm while the maximum pulse rate was 93.0 bpm.      Assessment:     During diagnostic portion, delayed sleep latency, no clear REM observed.  Highly elevated arousal index.    Severe HARITHA (AHI 86.6) with significant sleep-associated hypoxemia, severe desaturations (thom SpO2 68.3%) and sustained hypoxemia in mid-70 s.  End-tidal capnography was not suggestive of acute hypoventilation.  Pre-study VBG was WNL (pH 7.38, pCO2 41, HCO3 24).  Potential that severity under-reported given no REM and no supine sleep observed.    CPAP at 12-13 cm H2O appeared effective in supine NREM, but with residual obstructive events in supine REM on 10-11 cm H2O.  Efficacy potential affected by ongoing high mask leak, presumed related to facial hair and patient did not tolerate nasal mask.     Recommendations:    Consider all-night PAP titration PSG.    Treatment of HARITHA with Auto?titrating PAP therapy with a range of 12 cmH2O to 15 cmH2O. Recommend clinical follow up with sleep management team, including review of compliance measures.    Advice regarding the risks of drowsy driving.    Suggest optimizing sleep schedule and avoiding sleep deprivation.    Weight management (if BMI > 30).    Pharmacologic therapy should be used for management of restless legs syndrome only if present and clinically indicated and  not based on the presence of periodic limb movements alone.     Diagnostic Codes:   Obstructive Sleep Apnea G47.33  Sleep Hypoxemia/Hypoventilation G47.36   Periodic Limb Movement Disorder G47.61     _____________________________________   Electronically Signed By: Eulogio Perera MD (9/23/2022)            Pulmonary blastomycosis (H) 03/18/2020     Priority: Medium     CAP (community acquired pneumonia) 03/05/2020     Priority: Medium     Pneumonia due to drug resistant Streptococcus pneumoniae (H) 02/19/2020     Priority: Medium     Pneumonia of lower lobe due to infectious organism, unspecified laterality 02/19/2020     Priority: Medium     Essential hypertension 05/20/2019     Priority: Medium     Morbid obesity (H) 04/11/2019     Priority: Medium     Hypothyroidism, unspecified type 04/11/2019     Priority: Medium       10 point ROS of systems including Constitutional, Eyes, Respiratory, Cardiovascular, Gastroenterology, Genitourinary, Integumentary, Muscularskeletal, Psychiatric were all negative except for pertinent positives noted in my HPI.    Current Outpatient Medications   Medication Sig Dispense Refill     levothyroxine (SYNTHROID/LEVOTHROID) 150 MCG tablet TAKE 1 TABLET BY MOUTH EVERY DAY (Patient not taking: Reported on 9/27/2022) 30 tablet 0     levothyroxine (SYNTHROID/LEVOTHROID) 175 MCG tablet TAKE 1 TABLET BY MOUTH EVERY DAY 90 tablet 0     lisinopril (ZESTRIL) 10 MG tablet Take 1 tablet (10 mg) by mouth daily 90 tablet 3       OBJECTIVE:  There were no vitals taken for this visit.    Physical Exam:  healthy, alert and no distress  PSYCH: Alert and oriented times 3; coherent speech, normal   rate and volume, able to articulate logical thoughts, able   to abstract reason, no tangential thoughts, no hallucinations   or delusions  His affect is normal  RESP: No cough, no audible wheezing, able to talk in full sentences  Remainder of exam unable to be completed due to telephone visits    ---  This  note was written with the assistance of the Dragon voice-dictation technology software. The final document, although reviewed, may contain errors. For corrections, please contact the office.    Eulogio Perera MD    Sleep Medicine    [place], [place]  o Main Office: [phone]    [facility] [address], [address]  o Schedule visits: [phone]  o Main Office: [phone]  o Fax: [phone]    Time spent on the date of service:  15 minutes.

## 2022-11-21 ENCOUNTER — VIRTUAL VISIT (OUTPATIENT)
Dept: PULMONOLOGY | Facility: OTHER | Age: 37
End: 2022-11-21
Attending: FAMILY MEDICINE
Payer: COMMERCIAL

## 2022-11-21 VITALS — BODY MASS INDEX: 45.1 KG/M2 | HEIGHT: 70 IN | WEIGHT: 315 LBS

## 2022-11-21 DIAGNOSIS — G47.34 NOCTURNAL HYPOXEMIA: ICD-10-CM

## 2022-11-21 DIAGNOSIS — G47.33 OSA (OBSTRUCTIVE SLEEP APNEA): Primary | ICD-10-CM

## 2022-11-21 DIAGNOSIS — I10 ESSENTIAL HYPERTENSION: ICD-10-CM

## 2022-11-21 PROCEDURE — 99212 OFFICE O/P EST SF 10 MIN: CPT | Mod: 95 | Performed by: FAMILY MEDICINE

## 2022-11-21 ASSESSMENT — SLEEP AND FATIGUE QUESTIONNAIRES
HOW LIKELY ARE YOU TO NOD OFF OR FALL ASLEEP WHILE SITTING AND READING: SLIGHT CHANCE OF DOZING
HOW LIKELY ARE YOU TO NOD OFF OR FALL ASLEEP WHILE SITTING QUIETLY AFTER LUNCH WITHOUT ALCOHOL: SLIGHT CHANCE OF DOZING
HOW LIKELY ARE YOU TO NOD OFF OR FALL ASLEEP WHILE SITTING INACTIVE IN A PUBLIC PLACE: WOULD NEVER DOZE
HOW LIKELY ARE YOU TO NOD OFF OR FALL ASLEEP WHILE LYING DOWN TO REST IN THE AFTERNOON WHEN CIRCUMSTANCES PERMIT: MODERATE CHANCE OF DOZING
HOW LIKELY ARE YOU TO NOD OFF OR FALL ASLEEP WHILE SITTING AND TALKING TO SOMEONE: WOULD NEVER DOZE
HOW LIKELY ARE YOU TO NOD OFF OR FALL ASLEEP IN A CAR, WHILE STOPPED FOR A FEW MINUTES IN TRAFFIC: WOULD NEVER DOZE
HOW LIKELY ARE YOU TO NOD OFF OR FALL ASLEEP WHILE WATCHING TV: WOULD NEVER DOZE
HOW LIKELY ARE YOU TO NOD OFF OR FALL ASLEEP WHEN YOU ARE A PASSENGER IN A CAR FOR AN HOUR WITHOUT A BREAK: SLIGHT CHANCE OF DOZING

## 2022-11-21 NOTE — PROGRESS NOTES
CPAP Compliance Visit:       Name: Rhett Moore MRN# [id number]   Age: 37 year old YOB: 1985     Date of Consultation: November 21, 2022  Primary care provider: Noelle Farrell    Compliance:   97 % of days with >4 hours of use.   0days/30 with no use   Average use: 6hours 47minutes per day   95%ile leak 9 L/min   CPAP 95% pressure 13.9 cm   AHI 0.1 events/hour   MARIO ALBERTO 0  PB 0%     Impression:   Patient is compliant with CPAP therapy and using CPAP with no issues. Continue to use CPAP and follow up with sleep medicine as necessary.

## 2022-11-21 NOTE — PROGRESS NOTES
Rhett is a 37 year old who is being evaluated via a billable telephone visit.      What phone number would you like to be contacted at? [phone]  How would you like to obtain your AVS? F[employer] [place]    Phone call duration: *** minutes

## 2022-12-02 DIAGNOSIS — E03.9 HYPOTHYROIDISM, UNSPECIFIED TYPE: ICD-10-CM

## 2022-12-05 RX ORDER — LEVOTHYROXINE SODIUM 175 UG/1
TABLET ORAL
Qty: 90 TABLET | Refills: 1 | Status: SHIPPED | OUTPATIENT
Start: 2022-12-05 | End: 2023-06-02

## 2022-12-05 NOTE — TELEPHONE ENCOUNTER
Synthroid       Last Written Prescription Date:  8/30/22  Last Fill Quantity: 90,   # refills: 0  Last Office Visit: 2/16/22  Future Office visit:

## 2023-03-08 DIAGNOSIS — I10 ESSENTIAL HYPERTENSION: ICD-10-CM

## 2023-03-09 RX ORDER — LISINOPRIL 10 MG/1
10 TABLET ORAL DAILY
Qty: 90 TABLET | Refills: 0 | Status: SHIPPED | OUTPATIENT
Start: 2023-03-09 | End: 2023-06-02

## 2023-03-09 NOTE — TELEPHONE ENCOUNTER
Pt is due for an office visit with PCP    Lisinopril      Last Written Prescription Date:  12.3.22  Last Fill Quantity: #90,   # refills: 0  Last Office Visit: 2.16.22  Future Office visit:       Routing refill request to provider for review/approval because:

## 2023-04-29 ENCOUNTER — HEALTH MAINTENANCE LETTER (OUTPATIENT)
Age: 38
End: 2023-04-29

## 2023-05-16 ENCOUNTER — HOSPITAL ENCOUNTER (EMERGENCY)
Facility: HOSPITAL | Age: 38
Discharge: HOME | End: 2023-05-16
Payer: COMMERCIAL

## 2023-05-16 VITALS
DIASTOLIC BLOOD PRESSURE: 110 MMHG | SYSTOLIC BLOOD PRESSURE: 168 MMHG | RESPIRATION RATE: 18 BRPM | OXYGEN SATURATION: 96 % | TEMPERATURE: 99.1 F | HEART RATE: 89 BPM

## 2023-05-16 DIAGNOSIS — M54.50 ACUTE LEFT-SIDED LOW BACK PAIN WITHOUT SCIATICA: ICD-10-CM

## 2023-05-16 LAB
ALBUMIN UR-MCNC: 10 MG/DL
APPEARANCE UR: CLEAR
BILIRUB UR QL STRIP: NEGATIVE
COLOR UR AUTO: YELLOW
GLUCOSE UR STRIP-MCNC: NEGATIVE MG/DL
HGB UR QL STRIP: NEGATIVE
HYALINE CASTS: 2 /LPF
KETONES UR STRIP-MCNC: NEGATIVE MG/DL
LEUKOCYTE ESTERASE UR QL STRIP: NEGATIVE
MUCOUS THREADS #/AREA URNS LPF: PRESENT /LPF
NITRATE UR QL: NEGATIVE
PH UR STRIP: 5.5 [PH] (ref 4.7–8)
RBC URINE: <1 /HPF
SP GR UR STRIP: 1.02 (ref 1–1.03)
SQUAMOUS EPITHELIAL: 0 /HPF
UROBILINOGEN UR STRIP-MCNC: NORMAL MG/DL
WBC URINE: 1 /HPF

## 2023-05-16 PROCEDURE — 81001 URINALYSIS AUTO W/SCOPE: CPT

## 2023-05-16 PROCEDURE — 99213 OFFICE O/P EST LOW 20 MIN: CPT

## 2023-05-16 PROCEDURE — G0463 HOSPITAL OUTPT CLINIC VISIT: HCPCS | Mod: 25

## 2023-05-16 PROCEDURE — 96372 THER/PROPH/DIAG INJ SC/IM: CPT

## 2023-05-16 PROCEDURE — 250N000011 HC RX IP 250 OP 636

## 2023-05-16 RX ORDER — METHOCARBAMOL 750 MG/1
750 TABLET, FILM COATED ORAL EVERY 6 HOURS
Qty: 20 TABLET | Refills: 0 | Status: SHIPPED | OUTPATIENT
Start: 2023-05-16 | End: 2023-05-21

## 2023-05-16 RX ORDER — METHOCARBAMOL 750 MG/1
750 TABLET, FILM COATED ORAL EVERY 6 HOURS
Qty: 20 TABLET | Refills: 0 | Status: SHIPPED | OUTPATIENT
Start: 2023-05-16 | End: 2023-05-16

## 2023-05-16 RX ORDER — KETOROLAC TROMETHAMINE 30 MG/ML
60 INJECTION, SOLUTION INTRAMUSCULAR; INTRAVENOUS ONCE
Status: COMPLETED | OUTPATIENT
Start: 2023-05-16 | End: 2023-05-16

## 2023-05-16 RX ADMIN — KETOROLAC TROMETHAMINE 60 MG: 30 INJECTION, SOLUTION INTRAMUSCULAR at 18:41

## 2023-05-16 ASSESSMENT — ENCOUNTER SYMPTOMS
FEVER: 0
DYSURIA: 0
ABDOMINAL PAIN: 0
CHILLS: 0
BACK PAIN: 1

## 2023-05-16 NOTE — ED TRIAGE NOTES
Patient presents to urgent care for back pain that started last night.  Patient had a cold a week ago and thinks the back pain could be contributed from coughing but wanted to make sure. The back pain is on the lower left side of his back. Took Ibuprofen with little relief. Patient went to work today but left at 1630 due tot the pain. Patient will need a work note to return to work. Patient knows his BP is high and that is because he did not take his BP medication today. Patient doesn't not have any symptoms of headache, heart palpitations or anything like that from his BP being high.        Triage Assessment     Row Name 05/16/23 [id number]       Triage Assessment (Adult)    Airway WDL WDL

## 2023-05-16 NOTE — ED PROVIDER NOTES
"  History     Chief Complaint   Patient presents with     Back Pain     HPI  Rhett Avalos is a 37 year old male who presents to urgent care with a 1 day history of left sided flank pain.  Describes pain as sharp, radiates around to the front, comes and goes.  It is made worse with coughing and certain movements.  Has not taken over-the-counter medications.  Denies associated fevers, chills, malaise, abdominal pain, dysuria.  No personal history of kidney stones.    Allergies:  No Known Allergies    Problem List:    Patient Active Problem List    Diagnosis Date Noted     HARITHA (obstructive sleep apnea) 09/27/2022     Priority: Medium     SLEEP STUDY INTERPRETATION  SPLIT NIGHT STUDY        Patient: RHETT AVALOS  YOB: 1985  Study Date: 9/21/2022  MRN: [id number]  Referring Provider: Noelle Farrell MD  Ordering Provider: Eulogio Perera MD     Indications for Polysomnography: The patient is a 36 year old Male who is 5' 9\" and weighs 380.0 lbs. His BMI is 56.3, Ramona sleepiness scale 1 and neck circumference is - cm. Relevant medical history includes HTN, severe obesity. A diagnostic polysomnogram was performed to evaluate for sleep apnea, hypoventilation, hypoxemia. After 124.0 minutes of sleep time the patient exhibited sufficient respiratory events qualifying him for a CPAP trial which was then initiated.     Polysomnogram Data: A full night polysomnogram recorded the standard physiologic parameters including EEG, EOG, EMG, ECG, nasal and oral airflow. Respiratory parameters of chest and abdominal movements were recorded with respiratory inductance plethysmography. Oxygen saturation was recorded by pulse oximetry.  Hypopnea scoring rule used: 1B 4%     Diagnostic PSG  Sleep Architecture: Delayed sleep latency, no clear REM observed.  Highly elevated arousal index.  The total recording time of the polysomnogram was 245.0 minutes. The total sleep time was 124.0 minutes. Sleep latency was " increased at 106.0 minutes without the use of a sleep aid. REM latency was - minutes. Arousal index was increased at 100.6 arousals per hour. Sleep efficiency was decreased at 50.6%. Wake after sleep onset was 15.0 minutes. The patient spent 17.3% of total sleep time in Stage N1, 64.1% in Stage N2, 18.5% in Stage N3, and 0.0% in REM. Time in REM supine was - minutes.     Respiration: Severe HARITHA (AHI 86.6) with significant sleep-associated hypoxemia, severe desaturations (thom SpO2 68.3%) and sustained hypoxemia in mid-70 s.  End-tidal capnography was not suggestive of acute hypoventilation.  Pre-study VBG was WNL (pH 7.38, pCO2 41, HCO3 24).  Potential that severity under-reported given no REM and no supine sleep observed.    Events ? The polysomnogram revealed a presence of 176 obstructive, - central, and 1 mixed apneas resulting in an apnea index of 85.6 events per hour. There were - obstructive hypopneas and - central hypopneas resulting in an obstructive hypopnea index of - and central hypopnea index of - events per hour. The combined apnea/hypopnea index was 86.6 events per hour (central apnea/hypopnea index was - events per hour).  The REM AHI was - events per hour. The supine AHI was - events per hour. The RERA index was - events per hour. The RDI was 86.6 events per hour.    Snoring - was reported as mild to moderate.    Respiratory rate and pattern - was notable for normal respiratory rate and pattern.    Sustained Sleep Associated Hypoventilation - End-tidal carbon dioxide monitoring was used, however significant hypoventilation was not present < 1 minutes at or greater than 55 mmHg.    Sleep Associated Hypoxemia - (Greater than 5 minutes O2 sat at or below 88%) was present. Baseline oxygen saturation was 95.7%. Lowest oxygen saturation was 68.3%. Time spent less than or equal to 88% was 14.7 minutes. Time spent less than or equal to 89% was 16.3 minutes.      Treatment PSG  Sleep Architecture: Improved  arousal index, improved sleep efficiency, supine REM was observed.  At 01:51:32 AM the patient was placed on PAP treatment and was titrated at pressures ranging from CPAP 5 cmH2O up to CPAP 13 cmH2O. The total recording time of the treatment portion of the study was 240.1 minutes. The total sleep time was 209.5 minutes. During the treatment portion of the study the sleep latency was 14.0 minutes. REM latency was 111.5 minutes. Arousal index was improved at 37.2 arousals per hour. Sleep efficiency was normal at 87.3%. Wake after sleep onset was 17.0 minutes. The patient spent 15.0% of total sleep time in Stage N1, 46.8% in Stage N2, 23.4% in Stage N3, and 14.8% in REM. Time in REM supine was 31.0 minutes.      Respiration: CPAP at 12-13 cm H2O appeared effective in supine NREM, but with residual obstructive events in supine REM on 10-11 cm H2O.  Efficacy potential affected by ongoing high mask leak, presumed related to facial hair and patient did not tolerate nasal mask.    The final pressure was CPAP 13 cmH2O with an AHI of 7.0 events per hour. Time in REM supine on final pressure was - minutes.     This titration was considered incomplete (does not meet any of the above criteria).     Movement Activity: Frequent PLM s with arousals during diagnostic portion, though difficult to assess given frequent obstructive events at this time.    Periodic Limb Movements  ? During the diagnostic portion of the study, there were 85 PLMs recorded. The PLM index was 41.1 movements per hour. The PLM Arousal Index was 39.2 per hour.  ? During the treatment portion of the study, there were 8 PLMs recorded. The PLM index was 2.3 movements per hour. The PLM Arousal Index was 2.0 per hour.    REM EMG Activity - Excessive transient/sustained muscle activity was not present.    Nocturnal Behavior - Abnormal sleep related behaviors were not noted during/arising out of NREM / REM sleep.     Bruxism - None apparent.     Cardiac Summary:  Appears NSR  During the diagnostic portion of the study, the average pulse rate was 73.6 bpm. The minimum pulse rate was 58.0 bpm while the maximum pulse rate was 114.0 bpm.     During the treatment portion of the study, the average pulse rate was 62.9 bpm. The minimum pulse rate was 50.0 bpm while the maximum pulse rate was 93.0 bpm.      Assessment:     During diagnostic portion, delayed sleep latency, no clear REM observed.  Highly elevated arousal index.    Severe HARITHA (AHI 86.6) with significant sleep-associated hypoxemia, severe desaturations (thom SpO2 68.3%) and sustained hypoxemia in mid-70 s.  End-tidal capnography was not suggestive of acute hypoventilation.  Pre-study VBG was WNL (pH 7.38, pCO2 41, HCO3 24).  Potential that severity under-reported given no REM and no supine sleep observed.    CPAP at 12-13 cm H2O appeared effective in supine NREM, but with residual obstructive events in supine REM on 10-11 cm H2O.  Efficacy potential affected by ongoing high mask leak, presumed related to facial hair and patient did not tolerate nasal mask.     Recommendations:    Consider all-night PAP titration PSG.    Treatment of HARITHA with Auto?titrating PAP therapy with a range of 12 cmH2O to 15 cmH2O. Recommend clinical follow up with sleep management team, including review of compliance measures.    Advice regarding the risks of drowsy driving.    Suggest optimizing sleep schedule and avoiding sleep deprivation.    Weight management (if BMI > 30).    Pharmacologic therapy should be used for management of restless legs syndrome only if present and clinically indicated and not based on the presence of periodic limb movements alone.     Diagnostic Codes:   Obstructive Sleep Apnea G47.33  Sleep Hypoxemia/Hypoventilation G47.36   Periodic Limb Movement Disorder G47.61     _____________________________________   Electronically Signed By: Eulogio Perera MD (9/23/2022)            Pulmonary blastomycosis (H) 03/18/2020      Priority: Medium     CAP (community acquired pneumonia) 03/05/2020     Priority: Medium     Pneumonia due to drug resistant Streptococcus pneumoniae (H) 02/19/2020     Priority: Medium     Pneumonia of lower lobe due to infectious organism, unspecified laterality 02/19/2020     Priority: Medium     Essential hypertension 05/20/2019     Priority: Medium     Morbid obesity (H) 04/11/2019     Priority: Medium     Hypothyroidism, unspecified type 04/11/2019     Priority: Medium        Past Medical History:    Past Medical History:   Diagnosis Date     Attention deficit disorder of childhood without me 02/16/2000     Chronic conjunctivitis of both eyes, unspecified chronic conjunctivitis type      Hypertrichosis of left upper eyelid      Hypertrichosis of right upper eyelid      Unspecified acquired hypothyroidism 10/19/2006       Past Surgical History:    Past Surgical History:   Procedure Laterality Date     APPENDECTOMY       TUBES         Family History:    Family History   Problem Relation Age of Onset     Cancer Mother      Cerebrovascular Disease Father        Social History:  Marital Status:  Single [1]  Social History     Tobacco Use     Smoking status: Never     Smokeless tobacco: Never   Substance Use Topics     Alcohol use: Yes     Drug use: Never        Medications:    levothyroxine (SYNTHROID/LEVOTHROID) 175 MCG tablet  lisinopril (ZESTRIL) 10 MG tablet  methocarbamol (ROBAXIN) 750 MG tablet  levothyroxine (SYNTHROID/LEVOTHROID) 150 MCG tablet          Review of Systems   Constitutional: Negative for chills and fever.   Gastrointestinal: Negative for abdominal pain.   Genitourinary: Negative for dysuria.   Musculoskeletal: Positive for back pain.   All other systems reviewed and are negative.      Physical Exam   BP: (!) 177/120  Pulse: 89  Temp: 99.1  F (37.3  C)  Resp: 18  SpO2: 96 %      Physical Exam  Constitutional:       General: He is not in acute distress.  Cardiovascular:      Rate and Rhythm:  Normal rate and regular rhythm.   Pulmonary:      Effort: Pulmonary effort is normal.   Abdominal:      Comments: Obese abdomen, makes for difficult abdominal assessment.  Soft and nontender.  Left-sided CVA tenderness.  No suprapubic tenderness.   Musculoskeletal:      Comments: No midline lumbar spine tenderness with palpation.  No deformities, crepitus, step-offs.  No lumbar paraspinal tenderness.   Skin:     General: Skin is warm and dry.   Neurological:      Mental Status: He is alert.         ED Course                 Procedures             Critical Care time:               Results for orders placed or performed during the hospital encounter of 05/16/23 (from the past 24 hour(s))   UA with Microscopic reflex to Culture    Specimen: Urine, Midstream   Result Value Ref Range    Color Urine Yellow Colorless, Straw, Light Yellow, Yellow    Appearance Urine Clear Clear    Glucose Urine Negative Negative mg/dL    Bilirubin Urine Negative Negative    Ketones Urine Negative Negative mg/dL    Specific Gravity Urine 1.022 1.003 - 1.035    Blood Urine Negative Negative    pH Urine 5.5 4.7 - 8.0    Protein Albumin Urine 10 (A) Negative mg/dL    Urobilinogen Urine Normal Normal, 2.0 mg/dL    Nitrite Urine Negative Negative    Leukocyte Esterase Urine Negative Negative    Mucus Urine Present (A) None Seen /LPF    RBC Urine <1 <=2 /HPF    WBC Urine 1 <=5 /HPF    Squamous Epithelials Urine 0 <=1 /HPF    Hyaline Casts Urine 2 <=2 /LPF    Narrative    Urine Culture not indicated       Medications   ketorolac (TORADOL) injection 60 mg (60 mg Intramuscular $Given 5/16/23 1841)       Assessments & Plan (with Medical Decision Making)     History and physical exam most consistent with acute lumbosacral strain likely secondary to recent coughing from URI.  Urinalysis shows no blood, leukocyte esterase, nitrates.  Does not support nephrolithiasis, urinary tract infection, pyelonephritis.  Plan is to treat with Toradol injection in  urgent care, Robaxin every 6 hours as needed along with Tylenol and ibuprofen.  If symptoms persist or worsen return to urgent care or follow-up with primary care.    I have reviewed the nursing notes.    I have reviewed the findings, diagnosis, plan and need for follow up with the patient.          Medical Decision Making  The patient's presentation was of .    The patient's evaluation involved:      The patient's management necessitated         Current Discharge Medication List      START taking these medications    Details   methocarbamol (ROBAXIN) 750 MG tablet Take 1 tablet (750 mg) by mouth every 6 hours for 5 days  Qty: 20 tablet, Refills: 0             Final diagnoses:   Acute left-sided low back pain without sciatica       5/16/2023   [facility]

## 2023-05-16 NOTE — DISCHARGE INSTRUCTIONS
Toradol injection administered in urgent care today, no further NSAIDs for 6 hours.  Robaxin 1 to 2 tablets every 6 hours as needed for back pain.  Continue ibuprofen 800 mg 3 times daily for the next 5 to 7 days.  Can add Tylenol at 1000 mg 3 times daily as needed for pain.  If symptoms persist or worsen return to urgent care or follow-up with primary care.

## 2023-05-16 NOTE — Clinical Note
Rhett Moore was seen and treated in our emergency department on 5/16/2023.  He may return to work on 05/17/2023.       If you have any questions or concerns, please don't hesitate to call.      Fortino Zavala PA-C

## 2023-05-17 ENCOUNTER — APPOINTMENT (OUTPATIENT)
Dept: CT IMAGING | Facility: HOSPITAL | Age: 38
End: 2023-05-17
Payer: COMMERCIAL

## 2023-05-17 ENCOUNTER — HOSPITAL ENCOUNTER (EMERGENCY)
Facility: HOSPITAL | Age: 38
Discharge: HOME | End: 2023-05-17
Payer: COMMERCIAL

## 2023-05-17 ENCOUNTER — APPOINTMENT (OUTPATIENT)
Dept: GENERAL RADIOLOGY | Facility: HOSPITAL | Age: 38
End: 2023-05-17
Payer: COMMERCIAL

## 2023-05-17 VITALS
RESPIRATION RATE: 16 BRPM | TEMPERATURE: 99.1 F | HEART RATE: 93 BPM | DIASTOLIC BLOOD PRESSURE: 74 MMHG | SYSTOLIC BLOOD PRESSURE: 138 MMHG | WEIGHT: 315 LBS | BODY MASS INDEX: 45.1 KG/M2 | OXYGEN SATURATION: 96 % | HEIGHT: 70 IN

## 2023-05-17 DIAGNOSIS — J18.9 PNEUMONIA DUE TO INFECTIOUS ORGANISM, UNSPECIFIED LATERALITY, UNSPECIFIED PART OF LUNG: ICD-10-CM

## 2023-05-17 DIAGNOSIS — J18.9 PNEUMONIA: ICD-10-CM

## 2023-05-17 LAB
BASOPHILS # BLD AUTO: 0 10E3/UL (ref 0–0.2)
BASOPHILS NFR BLD AUTO: 0 %
CREAT SERPL-MCNC: 1.06 MG/DL (ref 0.67–1.17)
D DIMER PPP FEU-MCNC: 1.22 UG/ML FEU (ref 0–0.5)
EOSINOPHIL # BLD AUTO: 0.1 10E3/UL (ref 0–0.7)
EOSINOPHIL NFR BLD AUTO: 1 %
ERYTHROCYTE [DISTWIDTH] IN BLOOD BY AUTOMATED COUNT: 13.8 % (ref 10–15)
GFR SERPL CREATININE-BSD FRML MDRD: >90 ML/MIN/1.73M2
HCT VFR BLD AUTO: 42.4 % (ref 40–53)
HGB BLD-MCNC: 13.7 G/DL (ref 13.3–17.7)
HOLD SPECIMEN: NORMAL
HOLD SPECIMEN: NORMAL
IMM GRANULOCYTES # BLD: 0 10E3/UL
IMM GRANULOCYTES NFR BLD: 0 %
LYMPHOCYTES # BLD AUTO: 1.6 10E3/UL (ref 0.8–5.3)
LYMPHOCYTES NFR BLD AUTO: 18 %
MCH RBC QN AUTO: 28.6 PG (ref 26.5–33)
MCHC RBC AUTO-ENTMCNC: 32.3 G/DL (ref 31.5–36.5)
MCV RBC AUTO: 89 FL (ref 78–100)
MONOCYTES # BLD AUTO: 1.1 10E3/UL (ref 0–1.3)
MONOCYTES NFR BLD AUTO: 12 %
NEUTROPHILS # BLD AUTO: 6.2 10E3/UL (ref 1.6–8.3)
NEUTROPHILS NFR BLD AUTO: 69 %
NRBC # BLD AUTO: 0 10E3/UL
NRBC BLD AUTO-RTO: 0 /100
PLATELET # BLD AUTO: 283 10E3/UL (ref 150–450)
RBC # BLD AUTO: 4.79 10E6/UL (ref 4.4–5.9)
WBC # BLD AUTO: 8.9 10E3/UL (ref 4–11)

## 2023-05-17 PROCEDURE — 85025 COMPLETE CBC W/AUTO DIFF WBC: CPT

## 2023-05-17 PROCEDURE — 99214 OFFICE O/P EST MOD 30 MIN: CPT

## 2023-05-17 PROCEDURE — 71046 X-RAY EXAM CHEST 2 VIEWS: CPT

## 2023-05-17 PROCEDURE — G0463 HOSPITAL OUTPT CLINIC VISIT: HCPCS | Mod: 25

## 2023-05-17 PROCEDURE — 36415 COLL VENOUS BLD VENIPUNCTURE: CPT

## 2023-05-17 PROCEDURE — 82565 ASSAY OF CREATININE: CPT

## 2023-05-17 PROCEDURE — 71275 CT ANGIOGRAPHY CHEST: CPT

## 2023-05-17 PROCEDURE — 250N000013 HC RX MED GY IP 250 OP 250 PS 637

## 2023-05-17 PROCEDURE — 250N000011 HC RX IP 250 OP 636: Performed by: RADIOLOGY

## 2023-05-17 PROCEDURE — 85379 FIBRIN DEGRADATION QUANT: CPT

## 2023-05-17 RX ORDER — AZITHROMYCIN 250 MG/1
TABLET, FILM COATED ORAL
Qty: 6 TABLET | Refills: 0 | Status: SHIPPED | OUTPATIENT
Start: 2023-05-17 | End: 2023-05-22

## 2023-05-17 RX ORDER — AZITHROMYCIN 250 MG/1
TABLET, FILM COATED ORAL
Qty: 6 TABLET | Refills: 0 | Status: SHIPPED | OUTPATIENT
Start: 2023-05-17 | End: 2023-05-17

## 2023-05-17 RX ORDER — LIDOCAINE 4 G/G
1 PATCH TOPICAL ONCE
Status: DISCONTINUED | OUTPATIENT
Start: 2023-05-17 | End: 2023-05-17 | Stop reason: HOSPADM

## 2023-05-17 RX ORDER — IOPAMIDOL 755 MG/ML
77 INJECTION, SOLUTION INTRAVASCULAR ONCE
Status: COMPLETED | OUTPATIENT
Start: 2023-05-17 | End: 2023-05-17

## 2023-05-17 RX ADMIN — LIDOCAINE 1 PATCH: 4 PATCH TOPICAL at 13:35

## 2023-05-17 RX ADMIN — IOPAMIDOL 77 ML: 755 INJECTION, SOLUTION INTRAVENOUS at 15:48

## 2023-05-17 ASSESSMENT — ENCOUNTER SYMPTOMS
ACTIVITY CHANGE: 0
FEVER: 0
CHILLS: 0
LIGHT-HEADEDNESS: 0
BACK PAIN: 1
SHORTNESS OF BREATH: 0
VOMITING: 0
ABDOMINAL PAIN: 0
APPETITE CHANGE: 0
DYSURIA: 0
RHINORRHEA: 0
DIZZINESS: 0
DIARRHEA: 0
NUMBNESS: 0
HEADACHES: 0
COUGH: 0
NAUSEA: 0
HEMATURIA: 0

## 2023-05-17 ASSESSMENT — ACTIVITIES OF DAILY LIVING (ADL)
ADLS_ACUITY_SCORE: 35
ADLS_ACUITY_SCORE: 35

## 2023-05-17 NOTE — Clinical Note
Rhett Moore was seen and treated in our emergency department on 5/17/2023.  He may return to work on 05/21/2023.       If you have any questions or concerns, please don't hesitate to call.      Melissa Lozano, NP

## 2023-05-17 NOTE — ED PROVIDER NOTES
"  History     Chief Complaint   Patient presents with     Back Pain     HPI  Rhett Avalos is a 37 year old male who presents to the urgent care with a two day history of intermittent stabbing left flank to mid back pain. He was seen yesterday and given a shot of toradol and robaxin without relief. He denies fevers, chills, shortness of breath, abd pain, n/v/d, hematuria, dysuria, and chest pain. Does have a recent history of a cough 1-2 weeks ago. 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 Non smoker. No recent abx. No hx of blood clots, asthma, recent travel, or surgeries. 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     Allergies:  No Known Allergies    Problem List:    Patient Active Problem List    Diagnosis Date Noted     HARITHA (obstructive sleep apnea) 09/27/2022     Priority: Medium     SLEEP STUDY INTERPRETATION  SPLIT NIGHT STUDY        Patient: RHETT AVALOS  YOB: 1985  Study Date: 9/21/2022  MRN: [id number]  Referring Provider: Noelle Farrell MD  Ordering Provider: Eulogio Perera MD     Indications for Polysomnography: The patient is a 36 year old Male who is 5' 9\" and weighs 380.0 lbs. His BMI is 56.3, Gibson sleepiness scale 1 and neck circumference is - cm. Relevant medical history includes HTN, severe obesity. A diagnostic polysomnogram was performed to evaluate for sleep apnea, hypoventilation, hypoxemia. After 124.0 minutes of sleep time the patient exhibited sufficient respiratory events qualifying him for a CPAP trial which was then initiated.     Polysomnogram Data: A full night polysomnogram recorded the standard physiologic parameters including EEG, EOG, EMG, ECG, nasal and oral airflow. Respiratory parameters of chest and abdominal movements were recorded with respiratory inductance plethysmography. Oxygen saturation was recorded by pulse oximetry.  Hypopnea scoring rule used: 1B " 4%     Diagnostic PSG  Sleep Architecture: Delayed sleep latency, no clear REM observed.  Highly elevated arousal index.  The total recording time of the polysomnogram was 245.0 minutes. The total sleep time was 124.0 minutes. Sleep latency was increased at 106.0 minutes without the use of a sleep aid. REM latency was - minutes. Arousal index was increased at 100.6 arousals per hour. Sleep efficiency was decreased at 50.6%. Wake after sleep onset was 15.0 minutes. The patient spent 17.3% of total sleep time in Stage N1, 64.1% in Stage N2, 18.5% in Stage N3, and 0.0% in REM. Time in REM supine was - minutes.     Respiration: Severe HARITHA (AHI 86.6) with significant sleep-associated hypoxemia, severe desaturations (thom SpO2 68.3%) and sustained hypoxemia in mid-70 s.  End-tidal capnography was not suggestive of acute hypoventilation.  Pre-study VBG was WNL (pH 7.38, pCO2 41, HCO3 24).  Potential that severity under-reported given no REM and no supine sleep observed.    Events ? The polysomnogram revealed a presence of 176 obstructive, - central, and 1 mixed apneas resulting in an apnea index of 85.6 events per hour. There were - obstructive hypopneas and - central hypopneas resulting in an obstructive hypopnea index of - and central hypopnea index of - events per hour. The combined apnea/hypopnea index was 86.6 events per hour (central apnea/hypopnea index was - events per hour).  The REM AHI was - events per hour. The supine AHI was - events per hour. The RERA index was - events per hour. The RDI was 86.6 events per hour.    Snoring - was reported as mild to moderate.    Respiratory rate and pattern - was notable for normal respiratory rate and pattern.    Sustained Sleep Associated Hypoventilation - End-tidal carbon dioxide monitoring was used, however significant hypoventilation was not present < 1 minutes at or greater than 55 mmHg.    Sleep Associated Hypoxemia - (Greater than 5 minutes O2 sat at or below 88%)  was present. Baseline oxygen saturation was 95.7%. Lowest oxygen saturation was 68.3%. Time spent less than or equal to 88% was 14.7 minutes. Time spent less than or equal to 89% was 16.3 minutes.      Treatment PSG  Sleep Architecture: Improved arousal index, improved sleep efficiency, supine REM was observed.  At 01:51:32 AM the patient was placed on PAP treatment and was titrated at pressures ranging from CPAP 5 cmH2O up to CPAP 13 cmH2O. The total recording time of the treatment portion of the study was 240.1 minutes. The total sleep time was 209.5 minutes. During the treatment portion of the study the sleep latency was 14.0 minutes. REM latency was 111.5 minutes. Arousal index was improved at 37.2 arousals per hour. Sleep efficiency was normal at 87.3%. Wake after sleep onset was 17.0 minutes. The patient spent 15.0% of total sleep time in Stage N1, 46.8% in Stage N2, 23.4% in Stage N3, and 14.8% in REM. Time in REM supine was 31.0 minutes.      Respiration: CPAP at 12-13 cm H2O appeared effective in supine NREM, but with residual obstructive events in supine REM on 10-11 cm H2O.  Efficacy potential affected by ongoing high mask leak, presumed related to facial hair and patient did not tolerate nasal mask.    The final pressure was CPAP 13 cmH2O with an AHI of 7.0 events per hour. Time in REM supine on final pressure was - minutes.     This titration was considered incomplete (does not meet any of the above criteria).     Movement Activity: Frequent PLM s with arousals during diagnostic portion, though difficult to assess given frequent obstructive events at this time.    Periodic Limb Movements  ? During the diagnostic portion of the study, there were 85 PLMs recorded. The PLM index was 41.1 movements per hour. The PLM Arousal Index was 39.2 per hour.  ? During the treatment portion of the study, there were 8 PLMs recorded. The PLM index was 2.3 movements per hour. The PLM Arousal Index was 2.0 per  hour.    REM EMG Activity - Excessive transient/sustained muscle activity was not present.    Nocturnal Behavior - Abnormal sleep related behaviors were not noted during/arising out of NREM / REM sleep.     Bruxism - None apparent.     Cardiac Summary: Appears NSR  During the diagnostic portion of the study, the average pulse rate was 73.6 bpm. The minimum pulse rate was 58.0 bpm while the maximum pulse rate was 114.0 bpm.     During the treatment portion of the study, the average pulse rate was 62.9 bpm. The minimum pulse rate was 50.0 bpm while the maximum pulse rate was 93.0 bpm.      Assessment:     During diagnostic portion, delayed sleep latency, no clear REM observed.  Highly elevated arousal index.    Severe HARITHA (AHI 86.6) with significant sleep-associated hypoxemia, severe desaturations (thom SpO2 68.3%) and sustained hypoxemia in mid-70 s.  End-tidal capnography was not suggestive of acute hypoventilation.  Pre-study VBG was WNL (pH 7.38, pCO2 41, HCO3 24).  Potential that severity under-reported given no REM and no supine sleep observed.    CPAP at 12-13 cm H2O appeared effective in supine NREM, but with residual obstructive events in supine REM on 10-11 cm H2O.  Efficacy potential affected by ongoing high mask leak, presumed related to facial hair and patient did not tolerate nasal mask.     Recommendations:    Consider all-night PAP titration PSG.    Treatment of HARITHA with Auto?titrating PAP therapy with a range of 12 cmH2O to 15 cmH2O. Recommend clinical follow up with sleep management team, including review of compliance measures.    Advice regarding the risks of drowsy driving.    Suggest optimizing sleep schedule and avoiding sleep deprivation.    Weight management (if BMI > 30).    Pharmacologic therapy should be used for management of restless legs syndrome only if present and clinically indicated and not based on the presence of periodic limb movements alone.     Diagnostic Codes:   Obstructive  Sleep Apnea G47.33  Sleep Hypoxemia/Hypoventilation G47.36   Periodic Limb Movement Disorder G47.61     _____________________________________   Electronically Signed By: Eulogio Perera MD (9/23/2022)            Pulmonary blastomycosis (H) 03/18/2020     Priority: Medium     CAP (community acquired pneumonia) 03/05/2020     Priority: Medium     Pneumonia due to drug resistant Streptococcus pneumoniae (H) 02/19/2020     Priority: Medium     Pneumonia of lower lobe due to infectious organism, unspecified laterality 02/19/2020     Priority: Medium     Essential hypertension 05/20/2019     Priority: Medium     Morbid obesity (H) 04/11/2019     Priority: Medium     Hypothyroidism, unspecified type 04/11/2019     Priority: Medium        Past Medical History:    Past Medical History:   Diagnosis Date     Attention deficit disorder of childhood without me 02/16/2000     Chronic conjunctivitis of both eyes, unspecified chronic conjunctivitis type      Hypertrichosis of left upper eyelid      Hypertrichosis of right upper eyelid      Unspecified acquired hypothyroidism 10/19/2006       Past Surgical History:    Past Surgical History:   Procedure Laterality Date     APPENDECTOMY       TUBES         Family History:    Family History   Problem Relation Age of Onset     Cancer Mother      Cerebrovascular Disease Father        Social History:  Marital Status:  Single [1]  Social History     Tobacco Use     Smoking status: Never     Smokeless tobacco: Never   Substance Use Topics     Alcohol use: Yes     Drug use: Never        Medications:    amoxicillin-clavulanate (AUGMENTIN) 875-125 MG tablet  azithromycin (ZITHROMAX Z-KEN) 250 MG tablet  levothyroxine (SYNTHROID/LEVOTHROID) 175 MCG tablet  lisinopril (ZESTRIL) 10 MG tablet  methocarbamol (ROBAXIN) 750 MG tablet  levothyroxine (SYNTHROID/LEVOTHROID) 150 MCG tablet          Review of Systems   Constitutional: Negative for activity change, appetite change, chills and fever.  "  HENT: Negative for congestion and rhinorrhea.    Respiratory: Negative for cough and shortness of breath.    Cardiovascular: Negative for chest pain.   Gastrointestinal: Negative for abdominal pain, diarrhea, nausea and vomiting.   Genitourinary: Negative for dysuria and hematuria.   Musculoskeletal: Positive for back pain.   Skin: Negative for rash.   Neurological: Negative for dizziness, light-headedness, numbness and headaches.   All other systems reviewed and are negative.      Physical Exam   BP: 134/85  Pulse: 94  Temp: 98.1  F (36.7  C)  Resp: 16  Height: 177.8 cm (5' 10\")  Weight: (!) 172.4 kg (380 lb)  SpO2: 95 %      Physical Exam  Vitals and nursing note reviewed.   Constitutional:       General: He is in acute distress.      Appearance: Normal appearance. He is not ill-appearing.   Cardiovascular:      Rate and Rhythm: Normal rate and regular rhythm.      Pulses: Normal pulses.      Heart sounds: Normal heart sounds. No murmur heard.  Pulmonary:      Effort: Pulmonary effort is normal. No tachypnea, bradypnea or respiratory distress.      Breath sounds: Normal breath sounds. No stridor. No wheezing or rhonchi.   Abdominal:      General: Abdomen is flat. Bowel sounds are normal. There is distension.      Palpations: Abdomen is soft.      Tenderness: There is no abdominal tenderness. There is no right CVA tenderness or left CVA tenderness.   Musculoskeletal:         General: No swelling, tenderness or signs of injury.   Skin:     General: Skin is warm and dry.      Capillary Refill: Capillary refill takes less than 2 seconds.      Findings: No erythema or rash.   Neurological:      General: No focal deficit present.      Mental Status: He is alert and oriented to person, place, and time.         ED Course              ED Course as of 05/17/23 1718   Wed May 17, 2023   1657 I was asked to consult on this patient.  He has CT findings suggestive of pneumonia, although the read suggest the possibility of PE. "  While his dimer is positive, he has no other specifically concerning risk factors, however clinically, he had a cough that worsened with changing sputum production that feels like previous pneumonias that he has had.  Clinically this is most consistent with a pneumonia and I recommend treating with antibiotics.     Procedures             Results for orders placed or performed during the hospital encounter of 05/17/23 (from the past 24 hour(s))   D dimer quantitative   Result Value Ref Range    D-Dimer Quantitative 1.22 (H) 0.00 - 0.50 ug/mL FEU    Narrative    This D-dimer assay is intended for use in conjunction with a clinical pretest probability assessment model to exclude pulmonary embolism (PE) and deep venous thrombosis (DVT) in outpatients suspected of PE or DVT. The cut-off value is 0.50 ug/mL FEU.   Extra Tube    Narrative    The following orders were created for panel order Extra Tube.  Procedure                               Abnormality         Status                     ---------                               -----------         ------                     Extra Green Top (Lithium...[[id number]]                      Final result               Extra Purple Top Tube[[id number]]                            Final result                 Please view results for these tests on the individual orders.   Extra Green Top (Lithium Heparin) Tube   Result Value Ref Range    Hold Specimen JIC    Extra Purple Top Tube   Result Value Ref Range    Hold Specimen JIC    Creatinine   Result Value Ref Range    Creatinine 1.06 0.67 - 1.17 mg/dL    GFR Estimate >90 >60 mL/min/1.73m2   CBC with platelets differential    Narrative    The following orders were created for panel order CBC with platelets differential.  Procedure                               Abnormality         Status                     ---------                               -----------         ------                     CBC with platelets and d...[[id number]]                       Final result                 Please view results for these tests on the individual orders.   CBC with platelets and differential   Result Value Ref Range    WBC Count 8.9 4.0 - 11.0 10e3/uL    RBC Count 4.79 4.40 - 5.90 10e6/uL    Hemoglobin 13.7 13.3 - 17.7 g/dL    Hematocrit 42.4 40.0 - 53.0 %    MCV 89 78 - 100 fL    MCH 28.6 26.5 - 33.0 pg    MCHC 32.3 31.5 - 36.5 g/dL    RDW 13.8 10.0 - 15.0 %    Platelet Count 283 150 - 450 10e3/uL    % Neutrophils 69 %    % Lymphocytes 18 %    % Monocytes 12 %    % Eosinophils 1 %    % Basophils 0 %    % Immature Granulocytes 0 %    NRBCs per 100 WBC 0 <1 /100    Absolute Neutrophils 6.2 1.6 - 8.3 10e3/uL    Absolute Lymphocytes 1.6 0.8 - 5.3 10e3/uL    Absolute Monocytes 1.1 0.0 - 1.3 10e3/uL    Absolute Eosinophils 0.1 0.0 - 0.7 10e3/uL    Absolute Basophils 0.0 0.0 - 0.2 10e3/uL    Absolute Immature Granulocytes 0.0 <=0.4 10e3/uL    Absolute NRBCs 0.0 10e3/uL   Chest XR,  PA & LAT    Narrative    Exam:  XR CHEST 2 VIEWS    HISTORY: left mid to upper back pain, no tenderness. history of recent  cough. rule out pneumonia.    COMPARISON:  9/24/2020, 3/3/2020    FINDINGS:     The cardiomediastinal contours are normal.      Mild patchy opacity in the left lung base. No pleural effusion or  pneumothorax.      No acute osseous abnormality.       Impression    IMPRESSION:      Mild patchy opacity in the left lung base is compatible with pneumonia  in the appropriate clinical setting.      CAMERON MOSHER MD         SYSTEM ID:  [id number]   CTA Chest with Contrast    Narrative    PROCEDURE:  CTA CHEST WITH CONTRAST.    HISTORY:  Evaluate for pulmonary embolism.  rule out PE, elevated d  dimer    TECHNIQUE:  Initial pre-contrast  and localizer images were  obtained.  Contrast enhanced helical CT pulmonary angiography was then  performed.  Routine transaxial and post-processed (multiplanar and/or  MIP) reformations were obtained. This CT exam was performed using  one  or more the following dose reduction techniques: automated exposure  control, adjustment of the mA and/or kV according to patient size,  and/or iterative reconstruction technique.    COMPARISON:  9/24/2020    MEDS/CONTRAST: 77 mL Isovue-370    PULMONARY CTA FINDINGS:  Mild motion artifact. No acute pulmonary  embolus is identified to the segmental level.    OTHER FINDINGS:      The heart is enlarged. No abnormal thoracic adenopathy is identified.  Some fluid is seen in the distal thoracic esophagus.    Limited views of the upper abdomen reveal cholelithiasis and  splenomegaly.     There is a small left pleural effusion. There is ill-defined opacity  in the left lower lobe., Some of which is hypoenhancing    No suspicious osseous lesion is identified.      Impression    IMPRESSION:    No acute pulmonary embolus to the proximal segmental level.    Ill-defined left lower lobe pulmonary opacity. Small left pleural  effusion.     Differential considerations include left lower lobe pneumonia and a  small parapneumonic effusion. Given the lack of fevers or chills, a  small/distal or resolved nonvisualized pulmonary embolus (given 2 day  history of symptoms) with a small pulmonary infarct and reactive  pleural fluid remain in the differential. Correlate with any evidence  of leukocytosis versus leg swelling. Consider lower extremity  ultrasound.    DAKSHA NGUYEN MD         SYSTEM ID:  [id number]       Medications   Lidocaine (LIDOCARE) 4 % Patch 1 patch (1 patch Transdermal $Patch/Med Applied 5/17/23 1335)   iopamidol (ISOVUE-370) solution 77 mL (77 mLs Intravenous $Given 5/17/23 1548)   sodium chloride (PF) 0.9% PF flush 100 mL (100 mLs Intravenous $Given 5/17/23 1547)       Assessments & Plan (with Medical Decision Making)     I have reviewed the nursing notes.    I have reviewed the findings, diagnosis, plan and need for follow up with the patient.  Rhett Moore is a 37 year old male who presents to the urgent  care with a two day history of intermittent stabbing left flank to mid back pain. He was seen yesterday and given a shot of toradol and robaxin without relief. He denies fevers, chills, shortness of breath, abd pain, n/v/d, hematuria, dysuria, and chest pain. Does have a recent history of a cough 1-2 weeks ago. 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 Non smoker. No recent abx. No hx of blood clots, asthma, recent travel, or surgeries. 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     MDM: CXR: mild patchy opacity in the left lung base is compatible with pneumonia in the appropriate clinical setting, per radiologist.     D dimer: 1.22. discussed other etiologies that would elevate d dimer, such an pneumonia.     Creatinine and CBC WNL.     Discussed risks and benefits of CTA chest along with signs of a PE. He does not have any pleuritic chest pain, shortness of breath, dyspnea, or hemoptysis. HR is mid 90s along with sat of 95%. Shared decision making to have CTA of chest. Well's criteria is 3.0 (moderate risk).     CTA chest: IMPRESSION:No acute pulmonary embolus to the proximal segmental level. Ill-defined left lower lobe pulmonary opacity. Small left pleural effusion. Differential considerations include left lower lobe pneumonia and a small parapneumonic effusion. Given the lack of fevers or chills, a  small/distal or resolved nonvisualized pulmonary embolus (given 2 day history of symptoms) with a small pulmonary infarct and reactive pleural fluid remain in the differential. Correlate with any evidence of leukocytosis versus leg swelling. Consider lower extremity.     Initial differentials include pneumonia, kidney stone, PE, and muscle strain.     VSS and afebrile. sats are low to mid 90s and HR 94. Lungs clear, heart tones regular. There is no muscular or spinal tenderness. No step off. He denies numbness/tinlging, saddle  paraesthesias, and loss of bowel bladder.     I did consult with Dr. Rosas on CT and patient's presentation. He did come to the urgent care and spoke with patient. Rhett has no leg or arm swelling, no recent travel or long trips. 1-2 weeks ago he had a dry cough that progressed to productive. He does have a history of blasto ~3 years ago and had long term antifungals. Less suspicious for PE. Shared decision making to treat for pneumonia. I did discuss immediate return with any chest pain, shortness of breath, or concerns.     (J18.9) Pneumonia  Plan: augmentin and azithromycin prescribed. Tylenol and ibuprofen as needed for pain. Continue to cough and deep breath. Return with any shortness of breath, chest pain, or concerns. Follow up with PCP in one week. Understanding verbalized.         Discharge Medication List as of 5/17/2023  5:03 PM      START taking these medications    Details   amoxicillin-clavulanate (AUGMENTIN) 875-125 MG tablet Take 1 tablet by mouth 2 times daily for 7 days, Disp-14 tablet, R-0, E-Prescribe      azithromycin (ZITHROMAX Z-KEN) 250 MG tablet Two tablets on the first day, then one tablet daily for the next 4 days, Disp-6 tablet, R-0, E-Prescribe             Final diagnoses:   Pneumonia       5/17/2023   [facility]     Melissa Lozano NP  05/17/23 0945

## 2023-05-17 NOTE — ED TRIAGE NOTES
Pt presents with c/o  left, mid to lower back pain. Reports he was seen yesterday, given a shot, and some muscle relaxer's. States pain is so bad he can't lay down and had to sleep in his chair last night. Is unsure of injury. 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 Pain is when he moves, coughs, or takes deep breaths. Denies pain at this time. States he has been taking muscle relaxers with no relief. Also tried ibuprofen.

## 2023-05-17 NOTE — DISCHARGE INSTRUCTIONS
Yogurt or probiotic with antibiotics    Push fluids    Tylenol and ibuprofen for pain. You can take 1000mg of tylenol every 6 hours and 800mg of ibuprofen every 8 hours.     Please return immediately with any chest pain, shortness of breath, or concerns

## 2023-05-17 NOTE — ED TRIAGE NOTES
Pt reports that he was seen yesterday for lower back pain. He reports that he received a shot and some muscle relaxers and they didn't help. Pt states he can't even lay down and he had to sleep in a chair last night. Pt is unsure of injury, he states last week he had a cough, and they also put their docks and boat lifts in. Pt states it only hurts when he moves, coughs, or takes a deep breath. Pt denies pain at this time while sitting in triage, by with any aggravation pain is a 10/10. Pt denies numbness or tingling in the lower extremities. No loss of bowel or bladder.

## 2023-05-18 ENCOUNTER — TELEPHONE (OUTPATIENT)
Dept: FAMILY MEDICINE | Facility: OTHER | Age: 38
End: 2023-05-18

## 2023-05-18 NOTE — TELEPHONE ENCOUNTER
Er F/U  5/17/23     Pneumonia  Clinical impression  Back Pain  Chief complaint      Pneumonia  Clinical impression  Back Pain  Chief complaint     START taking these medications     Details   amoxicillin-clavulanate (AUGMENTIN) 875-125 MG tablet Take 1 tablet by mouth 2 times daily for 7 days, Disp-14 tablet, R-0, E-Prescribe       azithromycin (ZITHROMAX Z-KEN) 250 MG tablet Two tablets on the first day, then one tablet daily for the next 4 days, Disp-6 tablet, R-0, E-Prescribe         Assessments & Plan (with Medical Decision Making)      I have reviewed the nursing notes.     I have reviewed the findings, diagnosis, plan and need for follow up with the patient.  Rhett Moore is a 37 year old male who presents to the urgent care with a two day history of intermittent stabbing left flank to mid back pain. He was seen yesterday and given a shot of toradol and robaxin without relief. He denies fevers, chills, shortness of breath, abd pain, n/v/d, hematuria, dysuria, and chest pain. Does have a recent history of a cough 1-2 weeks ago. 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 Non smoker. No recent abx. No hx of blood clots, asthma, recent travel, or surgeries. 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      MDM: CXR: mild patchy opacity in the left lung base is compatible with pneumonia in the appropriate clinical setting, per radiologist.      D dimer: 1.22. discussed other etiologies that would elevate d dimer, such an pneumonia.      Creatinine and CBC WNL.      Discussed risks and benefits of CTA chest along with signs of a PE. He does not have any pleuritic chest pain, shortness of breath, dyspnea, or hemoptysis. HR is mid 90s along with sat of 95%. Shared decision making to have CTA of chest. Well's criteria is 3.0 (moderate risk).      CTA chest: IMPRESSION:No acute pulmonary embolus to the proximal segmental level. Ill-defined  left lower lobe pulmonary opacity. Small left pleural effusion. Differential considerations include left lower lobe pneumonia and a small parapneumonic effusion. Given the lack of fevers or chills, a  small/distal or resolved nonvisualized pulmonary embolus (given 2 day history of symptoms) with a small pulmonary infarct and reactive pleural fluid remain in the differential. Correlate with any evidence of leukocytosis versus leg swelling. Consider lower extremity.      Initial differentials include pneumonia, kidney stone, PE, and muscle strain.      VSS and afebrile. sats are low to mid 90s and HR 94. Lungs clear, heart tones regular. There is no muscular or spinal tenderness. No step off. He denies numbness/tinlging, saddle paraesthesias, and loss of bowel bladder.      I did consult with Dr. Rosas on CT and patient's presentation. He did come to the urgent care and spoke with patient. Rhett has no leg or arm swelling, no recent travel or long trips. 1-2 weeks ago he had a dry cough that progressed to productive. He does have a history of blasto ~3 years ago and had long term antifungals. Less suspicious for PE. Shared decision making to treat for pneumonia. I did discuss immediate return with any chest pain, shortness of breath, or concerns.      (J18.9) Pneumonia  Plan: augmentin and azithromycin prescribed. Tylenol and ibuprofen as needed for pain. Continue to cough and deep breath. Return with any shortness of breath, chest pain, or concerns. Follow up with PCP in one week. Understanding verbalized.

## 2023-05-30 NOTE — PROGRESS NOTES
"  Assessment & Plan     Hypothyroidism, unspecified type  TSH abnormal mildly, but T4 normal.  Continue current dose.  Refills sent.  - TSH with free T4 reflex; Future  - TSH with free T4 reflex  - T4 free    Essential hypertension  Continue Lisinopril 10 mg.  Refills sent  - Comprehensive metabolic panel (BMP + Alb, Alk Phos, ALT, AST, Total. Bili, TP); Future  - Comprehensive metabolic panel (BMP + Alb, Alk Phos, ALT, AST, Total. Bili, TP)    Morbid obesity (H)  Labs updated  - Lipid Profile (Chol, Trig, HDL, LDL calc); Future  - Hemoglobin A1c; Future  - Insulin level; Future  - Lipid Profile (Chol, Trig, HDL, LDL calc)  - Hemoglobin A1c  - Insulin level    Screening for diabetes mellitus  - Hemoglobin A1c; Future  - Insulin level; Future  - Hemoglobin A1c  - Insulin level    Pleuritic pain  Left sided - effusion/pneumonia vs PE.  Given ongoing pain - repeat CT ordered to reassess.   See report.  Interval improvement in effusion/density - negative for PE.  - Comprehensive metabolic panel (BMP + Alb, Alk Phos, ALT, AST, Total. Bili, TP); Future  - CBC with platelets and differential; Future  - CTA Chest with Contrast; Future  - Comprehensive metabolic panel (BMP + Alb, Alk Phos, ALT, AST, Total. Bili, TP)  - CBC with platelets and differential    Abnormal CT scan, lung  Repeat improving as above - negative for PE.  - CTA Chest with Contrast; Future      35 minutes spent by me on the date of the encounter doing chart review, history and exam, documentation and further activities per the note       BMI:   Estimated body mass index is 55 kg/m  as calculated from the following:    Height as of 5/17/23: 1.778 m (5' 10\").    Weight as of this encounter: 173.9 kg (383 lb 4.8 oz).   Weight management plan: Discussed healthy diet and exercise guidelines    See Patient Instructions    Return if symptoms worsen or fail to improve.    Noelle Shin MD  [facility] - LUCILA Delaney is a 37 " year old, presenting for the following health issues:  RECHECK      HPI     ED/UC Followup:    Facility:  [facility]  Date of visit: 5/16/2023 & 5/17/2023  Reason for visit: Acute left sided low back pain with sciatica: Pneumonia   Current Status: Patient stated the the back pain when away for probably 7 days and now says he is having some soreness again.     CTA - pneumonia vs small PE.  Treating for pneumonia.  Presented with left back pain, cough.  No fall or injury or change activity.  No shortness of breath.  No leg swelling.  No prior DVT/PE.  No family history.    Hypertension Follow-up    Do you check your blood pressure regularly outside of the clinic? No     Are you following a low salt diet? No    Are your blood pressures ever more than 140 on the top number (systolic) OR more   than 90 on the bottom number (diastolic), for example 140/90? Patient is not sure as he does not check BP outside of clinic    Hypothyroidism Follow-up    Since last visit, patient describes the following symptoms: Weight stable, no hair loss, no skin changes, no constipation, no loose stools      How many servings of fruits and vegetables do you eat daily?  0-1    On average, how many sweetened beverages do you drink each day (Examples: soda, juice, sweet tea, etc.  Do NOT count diet or artificially sweetened beverages)?   0    How many days per week do you exercise enough to make your heart beat faster? 3 or less    How many minutes a day do you exercise enough to make your heart beat faster? 9 or less  How many days per week do you miss taking your medication? 1    What makes it hard for you to take your medications?  remembering to take and due to shift changes at work         Review of Systems   Constitutional, HEENT, cardiovascular, pulmonary, gi and gu systems are negative, except as otherwise noted.      Objective    /81 (BP Location: Right arm, Patient Position: Sitting, Cuff Size: Adult Large)   Pulse 82   Temp 98.2  F  (36.8  C) (Tympanic)   Wt (!) 173.9 kg (383 lb 4.8 oz)   SpO2 96%   BMI 55.00 kg/m    Body mass index is 55 kg/m .  Physical Exam   GENERAL: alert, no distress and morbidly obese  EYES: Eyes grossly normal to inspection, PERRL and conjunctivae and sclerae normal  HENT: ear canals and TM's normal, nose and mouth without ulcers or lesions  NECK: no adenopathy, no asymmetry, masses, or scars and thyroid normal to palpation  RESP: lungs clear to auscultation - no rales, rhonchi or wheezes  CV: regular rate and rhythm, normal S1 S2, no S3 or S4, no murmur, click or rub, no peripheral edema and peripheral pulses strong  ABDOMEN: soft, nontender, no hepatosplenomegaly, no masses and bowel sounds normal  MS: no gross musculoskeletal defects noted, no edema  NEURO: Normal strength and tone, mentation intact and speech normal  PSYCH: mentation appears normal, affect normal/bright    Results for orders placed or performed in visit on 06/02/23 (from the past 24 hour(s))   TSH with free T4 reflex   Result Value Ref Range    TSH 6.59 (H) 0.30 - 4.20 uIU/mL   Comprehensive metabolic panel (BMP + Alb, Alk Phos, ALT, AST, Total. Bili, TP)   Result Value Ref Range    Sodium 139 136 - 145 mmol/L    Potassium 4.2 3.4 - 5.3 mmol/L    Chloride 102 98 - 107 mmol/L    Carbon Dioxide (CO2) 25 22 - 29 mmol/L    Anion Gap 12 7 - 15 mmol/L    Urea Nitrogen 14.2 6.0 - 20.0 mg/dL    Creatinine 1.18 (H) 0.67 - 1.17 mg/dL    Calcium 9.5 8.6 - 10.0 mg/dL    Glucose 89 70 - 99 mg/dL    Alkaline Phosphatase 68 40 - 129 U/L    AST 31 10 - 50 U/L    ALT 77 (H) 10 - 50 U/L    Protein Total 7.7 6.4 - 8.3 g/dL    Albumin 4.3 3.5 - 5.2 g/dL    Bilirubin Total 0.8 <=1.2 mg/dL    GFR Estimate 83 >60 mL/min/1.73m2   Lipid Profile (Chol, Trig, HDL, LDL calc)   Result Value Ref Range    Cholesterol 216 (H) <200 mg/dL    Triglycerides 99 <150 mg/dL    Direct Measure HDL 47 >=40 mg/dL    LDL Cholesterol Calculated 149 (H) <=100 mg/dL    Non HDL Cholesterol 169  (H) <130 mg/dL    Narrative    Cholesterol  Desirable:  <200 mg/dL    Triglycerides  Normal:  Less than 150 mg/dL  Borderline High:  150-199 mg/dL  High:  200-499 mg/dL  Very High:  Greater than or equal to 500 mg/dL    Direct Measure HDL  Female:  Greater than or equal to 50 mg/dL   Male:  Greater than or equal to 40 mg/dL    LDL Cholesterol  Desirable:  <100mg/dL  Above Desirable:  100-129 mg/dL   Borderline High:  130-159 mg/dL   High:  160-189 mg/dL   Very High:  >= 190 mg/dL    Non HDL Cholesterol  Desirable:  130 mg/dL  Above Desirable:  130-159 mg/dL  Borderline High:  160-189 mg/dL  High:  190-219 mg/dL  Very High:  Greater than or equal to 220 mg/dL   Hemoglobin A1c   Result Value Ref Range    Estimated Average Glucose 105 mg/dL    Hemoglobin A1C 5.3 <5.7 %   CBC with platelets and differential    Narrative    The following orders were created for panel order CBC with platelets and differential.  Procedure                               Abnormality         Status                     ---------                               -----------         ------                     CBC with platelets and d...[[id number]]                      Final result                 Please view results for these tests on the individual orders.   CBC with platelets and differential   Result Value Ref Range    WBC Count 6.9 4.0 - 11.0 10e3/uL    RBC Count 5.03 4.40 - 5.90 10e6/uL    Hemoglobin 14.4 13.3 - 17.7 g/dL    Hematocrit 44.5 40.0 - 53.0 %    MCV 89 78 - 100 fL    MCH 28.6 26.5 - 33.0 pg    MCHC 32.4 31.5 - 36.5 g/dL    RDW 14.0 10.0 - 15.0 %    Platelet Count 310 150 - 450 10e3/uL    % Neutrophils 55 %    % Lymphocytes 30 %    % Monocytes 10 %    % Eosinophils 3 %    % Basophils 1 %    % Immature Granulocytes 1 %    NRBCs per 100 WBC 0 <1 /100    Absolute Neutrophils 3.9 1.6 - 8.3 10e3/uL    Absolute Lymphocytes 2.1 0.8 - 5.3 10e3/uL    Absolute Monocytes 0.7 0.0 - 1.3 10e3/uL    Absolute Eosinophils 0.2 0.0 - 0.7 10e3/uL     Absolute Basophils 0.1 0.0 - 0.2 10e3/uL    Absolute Immature Granulocytes 0.0 <=0.4 10e3/uL    Absolute NRBCs 0.0 10e3/uL   T4 free   Result Value Ref Range    Free T4 1.44 0.90 - 1.70 ng/dL

## 2023-06-02 ENCOUNTER — OFFICE VISIT (OUTPATIENT)
Dept: FAMILY MEDICINE | Facility: OTHER | Age: 38
End: 2023-06-02
Attending: FAMILY MEDICINE
Payer: COMMERCIAL

## 2023-06-02 ENCOUNTER — HOSPITAL ENCOUNTER (OUTPATIENT)
Dept: CT IMAGING | Facility: HOSPITAL | Age: 38
Discharge: HOME | End: 2023-06-02
Attending: FAMILY MEDICINE
Payer: COMMERCIAL

## 2023-06-02 VITALS
OXYGEN SATURATION: 96 % | TEMPERATURE: 98.2 F | BODY MASS INDEX: 55 KG/M2 | WEIGHT: 315 LBS | HEART RATE: 82 BPM | SYSTOLIC BLOOD PRESSURE: 135 MMHG | DIASTOLIC BLOOD PRESSURE: 81 MMHG

## 2023-06-02 DIAGNOSIS — R91.8 ABNORMAL CT SCAN, LUNG: ICD-10-CM

## 2023-06-02 DIAGNOSIS — Z13.1 SCREENING FOR DIABETES MELLITUS: ICD-10-CM

## 2023-06-02 DIAGNOSIS — R07.81 PLEURITIC PAIN: ICD-10-CM

## 2023-06-02 DIAGNOSIS — E03.9 HYPOTHYROIDISM, UNSPECIFIED TYPE: Primary | ICD-10-CM

## 2023-06-02 DIAGNOSIS — E66.01 MORBID OBESITY (H): ICD-10-CM

## 2023-06-02 DIAGNOSIS — I10 ESSENTIAL HYPERTENSION: ICD-10-CM

## 2023-06-02 LAB
ALBUMIN SERPL BCG-MCNC: 4.3 G/DL (ref 3.5–5.2)
ALP SERPL-CCNC: 68 U/L (ref 40–129)
ALT SERPL W P-5'-P-CCNC: 77 U/L (ref 10–50)
ANION GAP SERPL CALCULATED.3IONS-SCNC: 12 MMOL/L (ref 7–15)
AST SERPL W P-5'-P-CCNC: 31 U/L (ref 10–50)
BASOPHILS # BLD AUTO: 0.1 10E3/UL (ref 0–0.2)
BASOPHILS NFR BLD AUTO: 1 %
BILIRUB SERPL-MCNC: 0.8 MG/DL
BUN SERPL-MCNC: 14.2 MG/DL (ref 6–20)
CALCIUM SERPL-MCNC: 9.5 MG/DL (ref 8.6–10)
CHLORIDE SERPL-SCNC: 102 MMOL/L (ref 98–107)
CHOLEST SERPL-MCNC: 216 MG/DL
CREAT SERPL-MCNC: 1.18 MG/DL (ref 0.67–1.17)
DEPRECATED HCO3 PLAS-SCNC: 25 MMOL/L (ref 22–29)
EOSINOPHIL # BLD AUTO: 0.2 10E3/UL (ref 0–0.7)
EOSINOPHIL NFR BLD AUTO: 3 %
ERYTHROCYTE [DISTWIDTH] IN BLOOD BY AUTOMATED COUNT: 14 % (ref 10–15)
EST. AVERAGE GLUCOSE BLD GHB EST-MCNC: 105 MG/DL
GFR SERPL CREATININE-BSD FRML MDRD: 83 ML/MIN/1.73M2
GLUCOSE SERPL-MCNC: 89 MG/DL (ref 70–99)
HBA1C MFR BLD: 5.3 %
HCT VFR BLD AUTO: 44.5 % (ref 40–53)
HDLC SERPL-MCNC: 47 MG/DL
HGB BLD-MCNC: 14.4 G/DL (ref 13.3–17.7)
IMM GRANULOCYTES # BLD: 0 10E3/UL
IMM GRANULOCYTES NFR BLD: 1 %
INSULIN SERPL-ACNC: 24.9 UU/ML (ref 2.6–24.9)
LDLC SERPL CALC-MCNC: 149 MG/DL
LYMPHOCYTES # BLD AUTO: 2.1 10E3/UL (ref 0.8–5.3)
LYMPHOCYTES NFR BLD AUTO: 30 %
MCH RBC QN AUTO: 28.6 PG (ref 26.5–33)
MCHC RBC AUTO-ENTMCNC: 32.4 G/DL (ref 31.5–36.5)
MCV RBC AUTO: 89 FL (ref 78–100)
MONOCYTES # BLD AUTO: 0.7 10E3/UL (ref 0–1.3)
MONOCYTES NFR BLD AUTO: 10 %
NEUTROPHILS # BLD AUTO: 3.9 10E3/UL (ref 1.6–8.3)
NEUTROPHILS NFR BLD AUTO: 55 %
NONHDLC SERPL-MCNC: 169 MG/DL
NRBC # BLD AUTO: 0 10E3/UL
NRBC BLD AUTO-RTO: 0 /100
PLATELET # BLD AUTO: 310 10E3/UL (ref 150–450)
POTASSIUM SERPL-SCNC: 4.2 MMOL/L (ref 3.4–5.3)
PROT SERPL-MCNC: 7.7 G/DL (ref 6.4–8.3)
RBC # BLD AUTO: 5.03 10E6/UL (ref 4.4–5.9)
SODIUM SERPL-SCNC: 139 MMOL/L (ref 136–145)
T4 FREE SERPL-MCNC: 1.44 NG/DL (ref 0.9–1.7)
TRIGL SERPL-MCNC: 99 MG/DL
TSH SERPL DL<=0.005 MIU/L-ACNC: 6.59 UIU/ML (ref 0.3–4.2)
WBC # BLD AUTO: 6.9 10E3/UL (ref 4–11)

## 2023-06-02 PROCEDURE — 36415 COLL VENOUS BLD VENIPUNCTURE: CPT | Performed by: FAMILY MEDICINE

## 2023-06-02 PROCEDURE — 84439 ASSAY OF FREE THYROXINE: CPT | Performed by: FAMILY MEDICINE

## 2023-06-02 PROCEDURE — 83036 HEMOGLOBIN GLYCOSYLATED A1C: CPT | Performed by: FAMILY MEDICINE

## 2023-06-02 PROCEDURE — 83525 ASSAY OF INSULIN: CPT | Performed by: FAMILY MEDICINE

## 2023-06-02 PROCEDURE — 99214 OFFICE O/P EST MOD 30 MIN: CPT | Performed by: FAMILY MEDICINE

## 2023-06-02 PROCEDURE — 71275 CT ANGIOGRAPHY CHEST: CPT

## 2023-06-02 PROCEDURE — 80061 LIPID PANEL: CPT | Performed by: FAMILY MEDICINE

## 2023-06-02 PROCEDURE — 80050 GENERAL HEALTH PANEL: CPT | Performed by: FAMILY MEDICINE

## 2023-06-02 PROCEDURE — 250N000011 HC RX IP 250 OP 636: Performed by: RADIOLOGY

## 2023-06-02 RX ORDER — LISINOPRIL 10 MG/1
10 TABLET ORAL DAILY
Qty: 90 TABLET | Refills: 1 | Status: SHIPPED | OUTPATIENT
Start: 2023-06-02 | End: 2024-07-03

## 2023-06-02 RX ORDER — LEVOTHYROXINE SODIUM 175 UG/1
175 TABLET ORAL DAILY
Qty: 90 TABLET | Refills: 1 | Status: SHIPPED | OUTPATIENT
Start: 2023-06-02 | End: 2024-07-03

## 2023-06-02 RX ORDER — IOPAMIDOL 755 MG/ML
77 INJECTION, SOLUTION INTRAVASCULAR ONCE
Status: COMPLETED | OUTPATIENT
Start: 2023-06-02 | End: 2023-06-02

## 2023-06-02 RX ADMIN — IOPAMIDOL 77 ML: 755 INJECTION, SOLUTION INTRAVENOUS at 13:07

## 2023-06-02 ASSESSMENT — PAIN SCALES - GENERAL: PAINLEVEL: NO PAIN (0)

## 2023-06-02 NOTE — PATIENT INSTRUCTIONS
CT negative for PE.  Fluid resolved.  Pneumonia improving.  Labs overall stable.  Medications refilled.

## 2023-07-21 NOTE — TELEPHONE ENCOUNTER
Refill done.  Remind patient due for recheck lab.  Can do at hospital follow up next week.   Chest Port Removal    Discharge Instructions  - You have had a chest port removed from your chest.   - You may shower in 48 hours. No soaking or swimming for 2 weeks or until the site is completely healed.  - Keep the area covered and dry for the next 7 days.  - Do not perform any heavy lifting or put tension on the area for the next week or until the site is healed.  - Do not remove glue, it will fall off in the next 2-3 weeks.  - You may resume your normal diet.  - You may resume your normal medications however you should wait 48 hours before restarting aspirin, plavix, or blood thinners.  - It is normal to experience some pain over the site for the next few days. You may take apply ice to the area (20 minutes on, 20 minutes off) and take Tylenol for that pain. Do not take more frequently than every 6 hours and do not exceed more than 3000mg of Tylenol in a 24 hour period.    - You were given conscious sedation which may make you drowsy, therefore you need someone to stay with you until the morning following the procedure.  - Do not drive, engage in heavy lifting or strenuous activity, or drink any alcoholic beverages for the next 24 hours.   - You may resume normal activity in 24 hours.    Notify your primary physician and/or Interventional Radiology IMMEDIATELY if you experience any of the following       - Fever of 100.5F       - Chills or Rigors/ Shakes       - Swelling and/or Redness in the area around the port removal site       - Worsening Pain       - Blood soaked bandages or worsening bleeding       - Lightheadedness and/or dizziness upon standing       - Chest Pain/ Tightness       - Shortness of Breath       - Difficulty walking    If you have a problem that you believe requires IMMEDIATE attention, please go to your NEAREST Emergency Room. If you believe your problem can safely wait until you speak to a physician, please call Interventional Radiology for any concerns.    During Normal Weekday Business Hours- You can contact the Interventional Radiology department during normal business hours via telephone.  During Evenings and Weekends- If you need to contact Interventional Radiology during off hours, do so by calling the hospital and requesting to be connected to the Interventional Radiologist on call.

## 2023-12-12 DIAGNOSIS — G47.33 OBSTRUCTIVE SLEEP APNEA (ADULT) (PEDIATRIC): Primary | ICD-10-CM

## 2023-12-12 DIAGNOSIS — I10 ESSENTIAL HYPERTENSION, BENIGN: ICD-10-CM

## 2024-05-14 NOTE — PROGRESS NOTES
"  Assessment & Plan     Essential hypertension  Fair control with Lisinopril 10 mg daily -continue.  - Comprehensive metabolic panel (BMP + Alb, Alk Phos, ALT, AST, Total. Bili, TP); Future    Hyperlipidemia, unspecified hyperlipidemia type  Update fasting labs today.  - Comprehensive metabolic panel (BMP + Alb, Alk Phos, ALT, AST, Total. Bili, TP); Future  - Lipid Profile (Chol, Trig, HDL, LDL calc); Future    HARITHA (obstructive sleep apnea)  Cpap compliant.    Hypothyroidism, unspecified type  Updated lab and continue Synthroid, adjusting dose if not at goal.  - TSH with free T4 reflex; Future    Morbid obesity (H)  BMI 55.  No contraindication to GLP1.   Counseled on use, risks, side effects, goals, coverage issue, drug shortages, dose titration.  Zepbound script sent.  MTM referral placed.   Follow up with me every 3 months for weight check and chronic disease management.  - Hemoglobin A1c; Future  - Insulin level; Future  - Med Therapy Management Referral  - tirzepatide-Weight Management (ZEPBOUND) 2.5 MG/0.5ML prefilled pen; Inject 0.5 mLs (2.5 mg) Subcutaneous every 7 days    Encounter for weight management  As above.  - Insulin level; Future  - Med Therapy Management Referral  - tirzepatide-Weight Management (ZEPBOUND) 2.5 MG/0.5ML prefilled pen; Inject 0.5 mLs (2.5 mg) Subcutaneous every 7 days        BMI  Estimated body mass index is 55.17 kg/m  as calculated from the following:    Height as of this encounter: 1.778 m (5' 10\").    Weight as of this encounter: 174.4 kg (384 lb 8 oz).   Weight management plan: Discussed healthy diet and exercise guidelines      See Patient Instructions    Return in about 3 months (around 8/17/2024) for weight check, chronic disease management.    Ronnell Delaney is a 38 year old, presenting for the following health issues:  Hypertension and Thyroid Problem      History of Present Illness       Hypertension: He presents for follow up of hypertension.  He does not check " "blood pressure  regularly outside of the clinic. Outpatient blood pressures have not been over 140/90. He does not follow a low salt diet.     Hypothyroidism:     Since last visit, patient describes the following symptoms::  None    He eats 0-1 servings of fruits and vegetables daily.He consumes 0 sweetened beverage(s) daily.He exercises with enough effort to increase his heart rate 9 or less minutes per day.  He exercises with enough effort to increase his heart rate 3 or less days per week. He is missing 1 dose(s) of medications per week.       -Wants to discuss starting Zepbound??  BMI 55  No prior pancreatitis or thyroid cancer.  No chronic nausea, vomiting.      Vaccines - declines    HARITHA - CPAP - compliant    Got extra supply on accident for medications.    Hypertension Follow-up - lisinopril 10 mg  Do you check your blood pressure regularly outside of the clinic? No   Are you following a low salt diet? No  Are your blood pressures ever more than 140 on the top number (systolic) OR more   than 90 on the bottom number (diastolic), for example 140/90? No    Hypothyroidism Follow-up  Since last visit, patient describes the following symptoms: Weight stable, no hair loss, no skin changes, no constipation, no loose stools        Review of Systems  Constitutional, HEENT, cardiovascular, pulmonary, gi and gu systems are negative, except as otherwise noted.      Objective    /80 (BP Location: Right arm, Patient Position: Sitting, Cuff Size: Adult Large)   Pulse 83   Temp 98  F (36.7  C) (Tympanic)   Ht 1.778 m (5' 10\")   Wt (!) 174.4 kg (384 lb 8 oz)   SpO2 96%   BMI 55.17 kg/m    Body mass index is 55.17 kg/m .  Physical Exam   GENERAL: alert, no distress, and morbidly obese  NECK: no adenopathy, no asymmetry, masses, or scars  RESP: lungs clear to auscultation - no rales, rhonchi or wheezes  CV: regular rate and rhythm, normal S1 S2, no S3 or S4, no murmur, click or rub, no peripheral edema  ABDOMEN: " soft, nontender, no hepatosplenomegaly, no masses and bowel sounds normal  MS: no gross musculoskeletal defects noted, no edema  PSYCH: mentation appears normal, affect normal/bright    Labs pending        Signed Electronically by: Noelle Shin MD

## 2024-05-17 ENCOUNTER — OFFICE VISIT (OUTPATIENT)
Dept: FAMILY MEDICINE | Facility: OTHER | Age: 39
End: 2024-05-17
Attending: FAMILY MEDICINE
Payer: COMMERCIAL

## 2024-05-17 VITALS
WEIGHT: 315 LBS | HEIGHT: 70 IN | TEMPERATURE: 98 F | DIASTOLIC BLOOD PRESSURE: 80 MMHG | BODY MASS INDEX: 45.1 KG/M2 | OXYGEN SATURATION: 96 % | HEART RATE: 83 BPM | SYSTOLIC BLOOD PRESSURE: 132 MMHG

## 2024-05-17 DIAGNOSIS — E03.9 HYPOTHYROIDISM, UNSPECIFIED TYPE: ICD-10-CM

## 2024-05-17 DIAGNOSIS — I10 ESSENTIAL HYPERTENSION: Primary | ICD-10-CM

## 2024-05-17 DIAGNOSIS — G47.33 OSA (OBSTRUCTIVE SLEEP APNEA): ICD-10-CM

## 2024-05-17 DIAGNOSIS — Z76.89 ENCOUNTER FOR WEIGHT MANAGEMENT: ICD-10-CM

## 2024-05-17 DIAGNOSIS — E66.01 MORBID OBESITY (H): ICD-10-CM

## 2024-05-17 DIAGNOSIS — E78.5 HYPERLIPIDEMIA, UNSPECIFIED HYPERLIPIDEMIA TYPE: ICD-10-CM

## 2024-05-17 LAB
ALBUMIN SERPL BCG-MCNC: 4.2 G/DL (ref 3.5–5.2)
ALP SERPL-CCNC: 63 U/L (ref 40–150)
ALT SERPL W P-5'-P-CCNC: 52 U/L (ref 0–70)
ANION GAP SERPL CALCULATED.3IONS-SCNC: 9 MMOL/L (ref 7–15)
AST SERPL W P-5'-P-CCNC: 29 U/L (ref 0–45)
BILIRUB SERPL-MCNC: 0.7 MG/DL
BUN SERPL-MCNC: 13.2 MG/DL (ref 6–20)
CALCIUM SERPL-MCNC: 9.5 MG/DL (ref 8.6–10)
CHLORIDE SERPL-SCNC: 104 MMOL/L (ref 98–107)
CHOLEST SERPL-MCNC: 177 MG/DL
CREAT SERPL-MCNC: 1.17 MG/DL (ref 0.67–1.17)
DEPRECATED HCO3 PLAS-SCNC: 25 MMOL/L (ref 22–29)
EGFRCR SERPLBLD CKD-EPI 2021: 83 ML/MIN/1.73M2
EST. AVERAGE GLUCOSE BLD GHB EST-MCNC: 88 MG/DL
FASTING STATUS PATIENT QL REPORTED: YES
FASTING STATUS PATIENT QL REPORTED: YES
GLUCOSE SERPL-MCNC: 89 MG/DL (ref 70–99)
HBA1C MFR BLD: 4.7 %
HDLC SERPL-MCNC: 43 MG/DL
LDLC SERPL CALC-MCNC: 115 MG/DL
NONHDLC SERPL-MCNC: 134 MG/DL
POTASSIUM SERPL-SCNC: 4.3 MMOL/L (ref 3.4–5.3)
PROT SERPL-MCNC: 7.7 G/DL (ref 6.4–8.3)
SODIUM SERPL-SCNC: 138 MMOL/L (ref 135–145)
TRIGL SERPL-MCNC: 95 MG/DL
TSH SERPL DL<=0.005 MIU/L-ACNC: 2.51 UIU/ML (ref 0.3–4.2)

## 2024-05-17 PROCEDURE — 80061 LIPID PANEL: CPT | Performed by: FAMILY MEDICINE

## 2024-05-17 PROCEDURE — 84443 ASSAY THYROID STIM HORMONE: CPT | Performed by: FAMILY MEDICINE

## 2024-05-17 PROCEDURE — 99214 OFFICE O/P EST MOD 30 MIN: CPT | Performed by: FAMILY MEDICINE

## 2024-05-17 PROCEDURE — 80053 COMPREHEN METABOLIC PANEL: CPT | Performed by: FAMILY MEDICINE

## 2024-05-17 PROCEDURE — 36415 COLL VENOUS BLD VENIPUNCTURE: CPT | Performed by: FAMILY MEDICINE

## 2024-05-17 PROCEDURE — 83525 ASSAY OF INSULIN: CPT | Performed by: FAMILY MEDICINE

## 2024-05-17 PROCEDURE — 83036 HEMOGLOBIN GLYCOSYLATED A1C: CPT | Performed by: FAMILY MEDICINE

## 2024-05-17 ASSESSMENT — PAIN SCALES - GENERAL: PAINLEVEL: NO PAIN (0)

## 2024-05-17 NOTE — PATIENT INSTRUCTIONS
Will notify of lab results.  Continue Lisinopril and Synthroid.  Start Zepbound.    [facility] pharmacy consult for monthly check ins, dose adjustment.  Reach out with questions, side effects.  Follow up with me 3 months.

## 2024-05-18 LAB — INSULIN SERPL-ACNC: 24 UU/ML (ref 2.6–24.9)

## 2024-05-31 ENCOUNTER — VIRTUAL VISIT (OUTPATIENT)
Dept: PHARMACY | Facility: PHYSICIAN GROUP | Age: 39
End: 2024-05-31
Attending: FAMILY MEDICINE
Payer: COMMERCIAL

## 2024-05-31 DIAGNOSIS — E66.01 MORBID OBESITY (H): Primary | ICD-10-CM

## 2024-05-31 DIAGNOSIS — E03.9 HYPOTHYROIDISM, UNSPECIFIED TYPE: ICD-10-CM

## 2024-05-31 DIAGNOSIS — I10 ESSENTIAL HYPERTENSION: ICD-10-CM

## 2024-05-31 PROCEDURE — 99605 MTMS BY PHARM NP 15 MIN: CPT | Mod: 93

## 2024-05-31 NOTE — PATIENT INSTRUCTIONS
"Recommendations from today's MTM visit:                                                    MTM (medication therapy management) is a service provided by a clinical pharmacist designed to help you get the most of out of your medicines.   Today we reviewed what your medicines are for, how to know if they are working, that your medicines are safe and how to make your medicine regimen as easy as possible.      Increase your dose of Zepbound to 5 mg subcutaneously once weekly starting on Thursday, 06/13/2024.     Follow-up: Tuesday, 07/02/2024 at 8:00 AM via phone.    It was great speaking with you today.  I value your experience and would be very thankful for your time in providing feedback in our clinic survey. In the next few days, you may receive an email or text message from [employer] with a link to a survey related to your  clinical pharmacist.\"     To schedule another MTM appointment, please call the clinic directly or you may call the MTM scheduling line at [phone] or toll-free at [phone].     My Clinical Pharmacist's contact information:                                                      Please feel free to contact me with any questions or concerns you have.      Ashutosh Dover, PharmD  Medication Therapy Management Pharmacist  [facility]  Office phone: [phone]   "

## 2024-05-31 NOTE — PROGRESS NOTES
Medication Therapy Management (MTM) Encounter    ASSESSMENT:                            Medication Adherence/Access:   No issues identified    Weight Management:   Patient would benefit from  increasing his dose of Zepbound to 5 mg once weekly starting on Thursday, 06/13/2024 .     Hypertension:   Stable.   Patient is meeting blood pressure goal of < 130/80mmHg.    Hypothyroidism:   Stable.   Last TSH is within normal limits.     PLAN:                            Increase your dose of Zepbound to 5 mg subcutaneously once weekly starting on Thursday, 06/13/2024.     Follow-up: Tuesday, 07/02/2024 at 8:00 AM via phone.     SUBJECTIVE/OBJECTIVE:                          Rhett Moore is a 38 year old male called for an initial visit. He was referred to me from Dr. Noelle Farrell MD.      Reason for visit: Initial visit - weight management.    Allergies/ADRs: Reviewed in chart  Past Medical History: Reviewed in chart  Tobacco: He reports that he has never smoked. He has never been exposed to tobacco smoke. He has never used smokeless tobacco.  Alcohol: on occasion.   Caffeine: coffee on occasion.     Medication Adherence/Access: no issues reported  Patient takes medications directly from bottles.  Patient takes medications 1 time(s) per day.   Per patient, misses medication 0 times per week.   Medication barriers: none.   The patient fills medications at [place]: NO, fills medications at [facility] in [place], [place].    Weight Management   - Zepbound 2.5 mg once weekly (takes on Thursdays, has taken 3 doses so far)  Patient reports no current medication side effects.  Nutrition/Eating Habits: has cut back on snacking since starting Zepbound, eating a larger meal in the evening. Largest meal of the day - grills a lot in the summer, protein, vegetables, starch.     Exercise/Activity: A lot more active in the summer.   Patient has lost 13 pounds since starting Zepbound.   Medication History:  None.      Wt Readings  "from Last 4 Encounters:   05/17/24 (!) 384 lb 8 oz (174.4 kg)   06/02/23 (!) 383 lb 4.8 oz (173.9 kg)   05/17/23 (!) 380 lb (172.4 kg)   11/21/22 (!) 370 lb (167.8 kg)     Estimated body mass index is 55.17 kg/m  as calculated from the following:    Height as of 5/17/24: 5' 10\" (1.778 m).    Weight as of 5/17/24: 384 lb 8 oz (174.4 kg).    Hypertension   - Lisinopril 10 mg daily  Patient reports no current medication side effects  Patient does not self-monitor blood pressure.       Hypothyroidism   - Levothyroxine 175 mcg daily  Patient is having the following symptoms: none.      Today's Vitals: There were no vitals taken for this visit.  ----------------  I spent 12 minutes with this patient today. All changes were made via collaborative practice agreement with Noelle Shin MD. A copy of the visit note was provided to the patient's provider(s).    A summary of these recommendations was given to the patient.    Ashutosh Dover, PharmD  Medication Therapy Management Pharmacist  [facility]  Office phone: [phone]    Telemedicine Visit Details  Type of service:  Telephone visit  Start Time:  11:02 AM  End Time: 11:14 AM     Medication Therapy Recommendations  Morbid obesity (H)    Current Medication: tirzepatide-Weight Management (ZEPBOUND) 2.5 MG/0.5ML prefilled pen (Discontinued)   Rationale: Dose too low - Dosage too low - Effectiveness   Recommendation: Increase Dose - Zepbound 5 MG/0.5ML Soaj   Status: Accepted per CPA            "

## 2024-07-01 NOTE — PROGRESS NOTES
"Medication Therapy Management (MTM) Encounter    ASSESSMENT:                            Medication Adherence/Access:   No issues identified.    Weight Management:   Patient would benefit from  increasing his dose of Zepbound to 7.5 mg once weekly starting on Thursday, 07/11/2024 .     PLAN:                            Increase your dose of Zepbound to 7.5 mg subcutaneously once weekly starting on Thursday, 07/11/2024.    Follow-up: Monday, 07/29/2024 at 8:00 AM via phone.     SUBJECTIVE/OBJECTIVE:                          Rhett Moore is a 38 year old male seen for a follow-up visit.       Reason for visit: Follow-up visit - weight management.    Allergies/ADRs: Reviewed in chart  Past Medical History: Reviewed in chart  Tobacco: He reports that he has never smoked. He has never been exposed to tobacco smoke. He has never used smokeless tobacco.  Alcohol: none.    Medication Adherence/Access: no issues reported    Weight Management   - Zepbound 5 mg once weekly (takes on Thursdays, has taken 3 doses so far)  Patient reports no current medication side effects.  Nutrition/Eating Habits: no changes since last visit.   Exercise/Activity: no changes since last visit.   Patient has lost 27 pounds since starting Zepbound per his report.   Medication History:  None.      Wt Readings from Last 4 Encounters:   05/17/24 (!) 384 lb 8 oz (174.4 kg)   06/02/23 (!) 383 lb 4.8 oz (173.9 kg)   05/17/23 (!) 380 lb (172.4 kg)   11/21/22 (!) 370 lb (167.8 kg)     Estimated body mass index is 55.17 kg/m  as calculated from the following:    Height as of 5/17/24: 5' 10\" (1.778 m).    Weight as of 5/17/24: 384 lb 8 oz (174.4 kg).    Today's Vitals: There were no vitals taken for this visit.  ----------------  I spent 6 minutes with this patient today. All changes were made via collaborative practice agreement with Noelle Shin MD. A copy of the visit note was provided to the patient's provider(s).    A summary of these " recommendations was sent via [employer].    Ashutosh Dover, PharmD  Medication Therapy Management Pharmacist  [facility]  Office phone: [phone]    Telemedicine Visit Details  Type of service:  Telephone visit  Start Time:  8:04 AM  End Time: 8:10 AM     Medication Therapy Recommendations  Morbid obesity (H)    Current Medication: tirzepatide-Weight Management (ZEPBOUND) 5 MG/0.5ML prefilled pen (Discontinued)   Rationale: Dose too low - Dosage too low - Effectiveness   Recommendation: Increase Dose - Zepbound 7.5 MG/0.5ML Soaj   Status: Accepted per CPA

## 2024-07-02 ENCOUNTER — VIRTUAL VISIT (OUTPATIENT)
Dept: PHARMACY | Facility: OTHER | Age: 39
End: 2024-07-02
Payer: COMMERCIAL

## 2024-07-02 DIAGNOSIS — E66.01 MORBID OBESITY (H): Primary | ICD-10-CM

## 2024-07-02 PROCEDURE — 99606 MTMS BY PHARM EST 15 MIN: CPT | Mod: 93

## 2024-07-02 NOTE — PATIENT INSTRUCTIONS
"Recommendations from today's MTM visit:                                                      Increase your dose of Zepbound to 7.5 mg subcutaneously once weekly starting on Thursday, 07/11/2024.    Follow-up: Monday, 07/29/2024 at 8:00 AM via phone.     It was great speaking with you today.  I value your experience and would be very thankful for your time in providing feedback in our clinic survey. In the next few days, you may receive an email or text message from [employer] with a link to a survey related to your  clinical pharmacist.\"     To schedule another MTM appointment, please call the clinic directly or you may call the MTM scheduling line at [phone] or toll-free at [phone].     My Clinical Pharmacist's contact information:                                                      Please feel free to contact me with any questions or concerns you have.      Ashutosh Dover, PharmD  Medication Therapy Management Pharmacist  [facility] and [facility]  Office phone: [phone]   "

## 2024-07-03 DIAGNOSIS — E03.9 HYPOTHYROIDISM, UNSPECIFIED TYPE: ICD-10-CM

## 2024-07-03 DIAGNOSIS — I10 ESSENTIAL HYPERTENSION: ICD-10-CM

## 2024-07-03 RX ORDER — LISINOPRIL 10 MG/1
10 TABLET ORAL DAILY
Qty: 90 TABLET | Refills: 1 | Status: SHIPPED | OUTPATIENT
Start: 2024-07-03

## 2024-07-03 RX ORDER — LEVOTHYROXINE SODIUM 175 UG/1
175 TABLET ORAL DAILY
Qty: 90 TABLET | Refills: 1 | Status: SHIPPED | OUTPATIENT
Start: 2024-07-03

## 2024-07-03 NOTE — TELEPHONE ENCOUNTER
Levothyroxine (Synthroid/Levothroid) 175 MCG tablet    Last Written Prescription Date:  06/02/2023  Last Fill Quantity: 90,   # refills: 1  Last Office Visit: 05/17/2024     Lisinopril (Zestril) 10 MG tablet     Last Written Prescription Date:  06/02/2023  Last Fill Quantity: 90,   # refills: 1

## 2024-07-06 ENCOUNTER — HEALTH MAINTENANCE LETTER (OUTPATIENT)
Age: 39
End: 2024-07-06

## 2024-07-07 ENCOUNTER — MYC MEDICAL ADVICE (OUTPATIENT)
Dept: FAMILY MEDICINE | Facility: OTHER | Age: 39
End: 2024-07-07

## 2024-07-10 ENCOUNTER — MYC MEDICAL ADVICE (OUTPATIENT)
Dept: PHARMACY | Facility: OTHER | Age: 39
End: 2024-07-10
Payer: COMMERCIAL

## 2024-07-10 ENCOUNTER — TELEPHONE (OUTPATIENT)
Dept: FAMILY MEDICINE | Facility: OTHER | Age: 39
End: 2024-07-10

## 2024-07-10 NOTE — TELEPHONE ENCOUNTER
Submitting PA for tirzepatide-Weight Management (ZEPBOUND) 7.5 MG/0.5ML prefilled pen but insurance is requesting the following info:    Has the patient lost at least 5 percent of baseline body weight OR has the patient continued to maintain their initial 5 percent weight loss?    Document the patient's weight prior to starting drug therapy for weight loss and the patient's current weight, including the dates the weights were taken.    Thank you!

## 2024-07-10 NOTE — TELEPHONE ENCOUNTER
Patient left a message on the [facility] scheduling line. He has not been able to get his medication Zepbound 7.5 mg. He stated he thought that perhaps it wasn't called in to the pharmacy. Per chart, it appears the pharmacy is waiting for the prior auth. I sent patient a message via [email or web address].

## 2024-07-11 NOTE — TELEPHONE ENCOUNTER
Starting weight on 5/17/24 was 384lb  On 5/31 had virtual visit with Ashutosh SILVA PharmD and stated had lost about 13 pounds since starting Zepbound on 5/17.   On 7/2 had another virtual visit with Ashutosh SILVA PharmD had lost about 27 pounds since starting Zepbound.

## 2024-07-12 NOTE — TELEPHONE ENCOUNTER
Reason for call:  Medication      Have you contacted your pharmacy? Yes   If patient has contacted Pharmacy and it has been over 72hrs, continue to #2  Medication Zepbound 7.5 patient stated that he needs PA from clinic before he can  his prescription from pharmacy. Please contact patient at [phone] to advise.  What Pharmacy do you use? [facility] in [place]      (Please note that the turn-around-time for prescriptions is 72 business hours; I am sending your request at this time. SEND TO appropriate Care Team Pool )

## 2024-07-12 NOTE — TELEPHONE ENCOUNTER
Pt called and updated regarding the PA for his Zepbound: Received an APPROVAL from [employer] [facility] for  tirzepatide-Weight Management (ZEPBOUND) 7.5 MG/0.5ML prefilled pen. Effective dates 7/11/24-7/11/25.

## 2024-07-12 NOTE — TELEPHONE ENCOUNTER
Received an APPROVAL from [employer] for  tirzepatide-Weight Management (ZEPBOUND) 7.5 MG/0.5ML prefilled pen. Effective dates 7/11/24-7/11/25.

## 2024-07-13 ENCOUNTER — MYC MEDICAL ADVICE (OUTPATIENT)
Dept: FAMILY MEDICINE | Facility: OTHER | Age: 39
End: 2024-07-13

## 2024-07-13 DIAGNOSIS — E66.01 MORBID OBESITY (H): ICD-10-CM

## 2024-07-29 ENCOUNTER — VIRTUAL VISIT (OUTPATIENT)
Dept: PHARMACY | Facility: PHYSICIAN GROUP | Age: 39
End: 2024-07-29
Payer: COMMERCIAL

## 2024-07-29 DIAGNOSIS — E66.01 MORBID OBESITY (H): Primary | ICD-10-CM

## 2024-07-29 PROCEDURE — 99606 MTMS BY PHARM EST 15 MIN: CPT | Mod: 93

## 2024-07-29 NOTE — PROGRESS NOTES
"Medication Therapy Management (MTM) Encounter    ASSESSMENT:                            Medication Adherence/Access:   No issues identified.    Weight Management:   Patient would benefit from  increasing his dose of Zepbound to 10 mg once weekly starting on Thursday, 08/08/2024 .     PLAN:                            Increase your dose of Zepbound to 10 mg subcutaneously once weekly starting on Thursday, 08/08/2024.    Follow-up: Monday, 08/26/2024 at 8:00 AM via phone.     SUBJECTIVE/OBJECTIVE:                          Rhett Moore is a 38 year old male seen for a follow-up visit.       Reason for visit: Follow-up visit - weight management.    Allergies/ADRs: Reviewed in chart  Past Medical History: Reviewed in chart  Tobacco: He reports that he has never smoked. He has never been exposed to tobacco smoke. He has never used smokeless tobacco.  Alcohol: on occasion.     Medication Adherence/Access: no issues reported    Weight Management   - Zepbound 7.5 mg once weekly (takes on Thursdays typically, has taken 3 doses so far)  Patient reports no current medication side effects.  Nutrition/Eating Habits: no changes since last visit.   Exercise/Activity: no changes since last visit.   Patient has lost approximately 36 pounds since starting Zepbound per his report.   Patient currently weighs 348 lbs.   Medication History:  None.      Current weight today: 348 lbs per patient report  Cumulative Weight Loss: -36 lb, -9% from baseline    Wt Readings from Last 4 Encounters:   05/17/24 (!) 384 lb 8 oz (174.4 kg)   06/02/23 (!) 383 lb 4.8 oz (173.9 kg)   05/17/23 (!) 380 lb (172.4 kg)   11/21/22 (!) 370 lb (167.8 kg)     Estimated body mass index is 55.17 kg/m  as calculated from the following:    Height as of 5/17/24: 5' 10\" (1.778 m).    Weight as of 5/17/24: 384 lb 8 oz (174.4 kg).    Today's Vitals: There were no vitals taken for this visit.  ----------------  I spent 7 minutes with this patient today. All changes were " made via collaborative practice agreement with Noelle Shin MD. A copy of the visit note was provided to the patient's provider(s).    A summary of these recommendations was sent via [employer].    Ashutosh Dover, PharmD  Medication Therapy Management Pharmacist  [facility] and [facility]  Office phone: [phone]    Telemedicine Visit Details  Type of service:  Telephone visit  Start Time:  8:01 AM  End Time:  8:08 AM     Medication Therapy Recommendations  Morbid obesity (H)    Current Medication: tirzepatide-Weight Management (ZEPBOUND) 7.5 MG/0.5ML prefilled pen (Discontinued)   Rationale: Dose too low - Dosage too low - Effectiveness   Recommendation: Increase Dose - Zepbound 10 MG/0.5ML Soaj   Status: Accepted per CPA

## 2024-07-29 NOTE — PATIENT INSTRUCTIONS
"Recommendations from today's MTM visit:                                                       Increase your dose of Zepbound to 10 mg subcutaneously once weekly starting on Thursday, 08/08/2024.    Follow-up: Monday, 08/26/2024 at 8:00 AM via phone.     It was great speaking with you today.  I value your experience and would be very thankful for your time in providing feedback in our clinic survey. In the next few days, you may receive an email or text message from [employer] with a link to a survey related to your  clinical pharmacist.\"     To schedule another MTM appointment, please call the clinic directly or you may call the MTM scheduling line at [phone] or toll-free at [phone].     My Clinical Pharmacist's contact information:                                                      Please feel free to contact me with any questions or concerns you have.      Ashutosh Dover, PharmD  Medication Therapy Management Pharmacist  [facility]  Office phone: [phone]   "

## 2024-08-15 NOTE — PROGRESS NOTES
Assessment & Plan     Hypothyroidism, unspecified type  Stable with current synthroid dose - continue.  May need lower dose as he loses significant weight.  Suggest recheck TSH in 6 months.    Essential hypertension  Stable.  Continue Lisinopril 10 mg daily.  Continue with exercise and weight loss.  BMP stable 5/2024.    HARITHA (obstructive sleep apnea)  CPAP compliant    Morbid obesity (H)  BMI 48.  Down 50 pounds with Zepbound.  Working with MTM pharmacist.  Titrate to max dose.  Will see MTM this month and next - but that staff member will then be done.  Follow up with me 6 months.    External hemorrhoids  Mild on exam today.  Suspect internal hemorrhoids as well.  Desires consult with surgeon.  Symptomatic cares discussed - soaks, stool softener, avoid straining, Preparation H.  - Adult Gen Surg  Referral            See Patient Instructions    Return in about 6 months (around 2/22/2025) for med check with lab.    Ronnell Delaney is a 38 year old, presenting for the following health issues:  Recheck Medication        8/22/2024     9:47 AM   Additional Questions   Roomed by Topher Aguilar   Accompanied by None         8/22/2024     9:47 AM   Patient Reported Additional Medications   Patient reports taking the following new medications None     History of Present Illness       Hypertension: He presents for follow up of hypertension.  He does not check blood pressure  regularly outside of the clinic. Outpatient blood pressures have not been over 140/90. He does not follow a low salt diet.     Hypothyroidism:     Since last visit, patient describes the following symptoms::  None    He eats 0-1 servings of fruits and vegetables daily.He consumes 0 sweetened beverage(s) daily.He exercises with enough effort to increase his heart rate 10 to 19 minutes per day.  He exercises with enough effort to increase his heart rate 4 days per week.   He is taking medications regularly.       HARITHA - CPAP - compliant -  can't sleep without it    Hypertension Follow-up  Do you check your blood pressure regularly outside of the clinic? No   Are you following a low salt diet? No  Are your blood pressures ever more than 140 on the top number (systolic) OR more   than 90 on the bottom number (diastolic), for example 140/90? Yes  Lisinopril 10 mg daily    Hypothyroidism Follow-up  Since last visit, patient describes the following symptoms: Weight stable, no hair loss, no skin changes, no constipation, no loose stools    Medication Followup of Zepbound 10mg  Taking Medication as prescribed: yes  Side Effects:  None- no N/V  Medication Helping Symptoms:  yes  Started med in May  On month 4  Working with MTM  Eating less  1 meal per day -protein, veggies, variety  A lot more energy  Started exercising with girlfriend  Down 50 pounds  Hemorrhoids present; bothersome at times for days; pain, bleeding rarely      Review of Systems  Constitutional, HEENT, cardiovascular, pulmonary, gi and gu systems are negative, except as otherwise noted.      Objective    /84 (BP Location: Right arm, Patient Position: Sitting, Cuff Size: Adult Large)   Pulse 86   Temp 97.5  F (36.4  C) (Tympanic)   Resp 16   Wt (!) 152.2 kg (335 lb 9.6 oz)   SpO2 98%   BMI 48.15 kg/m    Body mass index is 48.15 kg/m .  Physical Exam   GENERAL: alert, no distress, and obese  NECK: no adenopathy, no asymmetry, masses, or scars  RESP: lungs clear to auscultation - no rales, rhonchi or wheezes  CV: regular rate and rhythm, normal S1 S2, no S3 or S4, no murmur, click or rub, no peripheral edema  ABDOMEN: soft, nontender, no hepatosplenomegaly, no masses and bowel sounds normal  RECTAL (male): normal sphincter tone, no rectal masses and small external hemorrhoid present  MS: no gross musculoskeletal defects noted, no edema  SKIN: no suspicious lesions or rashes  NEURO: Normal strength and tone, mentation intact and speech normal  PSYCH: mentation appears normal, affect  normal/bright    Labs from May reviewed.      The longitudinal plan of care for the diagnosis(es)/condition(s) as documented were addressed during this visit. Due to the added complexity in care, I will continue to support Rhett in the subsequent management and with ongoing continuity of care.  Signed Electronically by: Noelle Shin MD

## 2024-08-22 ENCOUNTER — OFFICE VISIT (OUTPATIENT)
Dept: FAMILY MEDICINE | Facility: OTHER | Age: 39
End: 2024-08-22
Attending: FAMILY MEDICINE
Payer: COMMERCIAL

## 2024-08-22 VITALS
TEMPERATURE: 97.5 F | SYSTOLIC BLOOD PRESSURE: 128 MMHG | BODY MASS INDEX: 48.15 KG/M2 | HEART RATE: 86 BPM | DIASTOLIC BLOOD PRESSURE: 84 MMHG | WEIGHT: 315 LBS | OXYGEN SATURATION: 98 % | RESPIRATION RATE: 16 BRPM

## 2024-08-22 DIAGNOSIS — K64.4 EXTERNAL HEMORRHOIDS: ICD-10-CM

## 2024-08-22 DIAGNOSIS — E03.9 HYPOTHYROIDISM, UNSPECIFIED TYPE: Primary | ICD-10-CM

## 2024-08-22 DIAGNOSIS — G47.33 OSA (OBSTRUCTIVE SLEEP APNEA): ICD-10-CM

## 2024-08-22 DIAGNOSIS — E66.01 MORBID OBESITY (H): ICD-10-CM

## 2024-08-22 DIAGNOSIS — I10 ESSENTIAL HYPERTENSION: ICD-10-CM

## 2024-08-22 PROCEDURE — G2211 COMPLEX E/M VISIT ADD ON: HCPCS | Performed by: FAMILY MEDICINE

## 2024-08-22 PROCEDURE — 99214 OFFICE O/P EST MOD 30 MIN: CPT | Performed by: FAMILY MEDICINE

## 2024-08-22 ASSESSMENT — PAIN SCALES - GENERAL: PAINLEVEL: NO PAIN (0)

## 2024-08-22 NOTE — PATIENT INSTRUCTIONS
Continue current medications.  Referral to general surgery for hemorrhoids. They will call you to schedule.  Follow up 6 months - weight check, med check, lab.

## 2024-08-26 ENCOUNTER — VIRTUAL VISIT (OUTPATIENT)
Dept: PHARMACY | Facility: PHYSICIAN GROUP | Age: 39
End: 2024-08-26
Payer: COMMERCIAL

## 2024-08-26 DIAGNOSIS — E66.01 MORBID OBESITY (H): Primary | ICD-10-CM

## 2024-08-26 PROCEDURE — 99606 MTMS BY PHARM EST 15 MIN: CPT | Mod: 93

## 2024-08-26 NOTE — PROGRESS NOTES
"Medication Therapy Management (MTM) Encounter    ASSESSMENT:                            Medication Adherence/Access:   No issues identified.    Weight Management:   Patient would benefit from  increasing his dose of Zepbound to 12.5 mg weekly starting on 09/05/2024 .     PLAN:                            Increase your dose of Zepbound to 12.5 mg subcutaneously once weekly starting on Thursday, 09/05/2024.    Follow-up: 1 month, sooner if needed.     SUBJECTIVE/OBJECTIVE:                          Rhett Moore is a 38 year old male seen for a follow-up visit.       Reason for visit: Follow-up visit - weight management.    Allergies/ADRs: Reviewed in chart  Past Medical History: Reviewed in chart  Tobacco: He reports that he has never smoked. He has never been exposed to tobacco smoke. He has never used smokeless tobacco.  Alcohol: none.    Medication Adherence/Access: no issues reported    Weight Management   - Zepbound 10 mg once weekly (takes on Thursdays typically, has taken 3 doses so far)  Patient reports no current medication side effects.  Nutrition/Eating Habits: no changes since last visit.   Exercise/Activity: no changes since last visit.   Patient has lost approximately 52 pounds since starting Zepbound per his report.   Patient's starting weight was 387 lbs.   Medication History:  None.      Cumulative Weight Loss: -52 lb, -13.4% from baseline    Wt Readings from Last 4 Encounters:   08/22/24 (!) 335 lb 9.6 oz (152.2 kg)   05/17/24 (!) 384 lb 8 oz (174.4 kg)   06/02/23 (!) 383 lb 4.8 oz (173.9 kg)   05/17/23 (!) 380 lb (172.4 kg)     Estimated body mass index is 48.15 kg/m  as calculated from the following:    Height as of 5/17/24: 5' 10\" (1.778 m).    Weight as of 8/22/24: 335 lb 9.6 oz (152.2 kg).    Today's Vitals: There were no vitals taken for this visit.  ----------------  I spent 4 minutes with this patient today. All changes were made via collaborative practice agreement with Noelle Shin MD. " A copy of the visit note was provided to the patient's provider(s).    A summary of these recommendations was sent via [employer].    Ashutosh Dover, PharmD  Medication Therapy Management Pharmacist  [facility] and [facility]  Office phone: [phone]    Telemedicine Visit Details  Type of service:  Telephone visit  Start Time:  8:05 AM  End Time:  8:09 AM     Medication Therapy Recommendations  Morbid obesity (H)    Current Medication: tirzepatide-Weight Management (ZEPBOUND) 10 MG/0.5ML prefilled pen (Discontinued)   Rationale: Dose too low - Dosage too low - Effectiveness   Recommendation: Increase Dose - Zepbound 12.5 MG/0.5ML Soaj   Status: Accepted per CPA

## 2024-08-26 NOTE — PATIENT INSTRUCTIONS
"Recommendations from today's MTM visit:                                                       Increase your dose of Zepbound to 12.5 mg subcutaneously once weekly starting on Thursday, 09/05/2024.    Follow-up: 1 month, sooner if needed.     It was great speaking with you today.  I value your experience and would be very thankful for your time in providing feedback in our clinic survey. In the next few days, you may receive an email or text message from [facility] with a link to a survey related to your  clinical pharmacist.\"     To schedule another MTM appointment, please call the clinic directly or you may call the MTM scheduling line at [phone] or toll-free at [phone].     My Clinical Pharmacist's contact information:                                                      Please feel free to contact me with any questions or concerns you have.      Ashutosh Dover, PharmD  Medication Therapy Management Pharmacist  [facility] and [facility]  Office phone: [phone]   "
Orbital...

## 2024-09-15 ENCOUNTER — HOSPITAL ENCOUNTER (EMERGENCY)
Facility: HOSPITAL | Age: 39
Discharge: HOME | End: 2024-09-15
Attending: NURSE PRACTITIONER | Admitting: NURSE PRACTITIONER
Payer: COMMERCIAL

## 2024-09-15 ENCOUNTER — APPOINTMENT (OUTPATIENT)
Dept: GENERAL RADIOLOGY | Facility: HOSPITAL | Age: 39
End: 2024-09-15
Attending: NURSE PRACTITIONER
Payer: COMMERCIAL

## 2024-09-15 VITALS
DIASTOLIC BLOOD PRESSURE: 81 MMHG | SYSTOLIC BLOOD PRESSURE: 142 MMHG | OXYGEN SATURATION: 96 % | HEART RATE: 87 BPM | TEMPERATURE: 98.2 F | RESPIRATION RATE: 18 BRPM

## 2024-09-15 DIAGNOSIS — T14.8XXA HEMATOMA OF SKIN: ICD-10-CM

## 2024-09-15 DIAGNOSIS — M25.562 LEFT KNEE PAIN, UNSPECIFIED CHRONICITY: ICD-10-CM

## 2024-09-15 DIAGNOSIS — M25.562 LEFT KNEE PAIN: Primary | ICD-10-CM

## 2024-09-15 PROCEDURE — G0463 HOSPITAL OUTPT CLINIC VISIT: HCPCS

## 2024-09-15 PROCEDURE — 73562 X-RAY EXAM OF KNEE 3: CPT | Mod: LT

## 2024-09-15 PROCEDURE — 99213 OFFICE O/P EST LOW 20 MIN: CPT | Performed by: NURSE PRACTITIONER

## 2024-09-15 ASSESSMENT — ENCOUNTER SYMPTOMS
JOINT SWELLING: 1
MYALGIAS: 1

## 2024-09-15 ASSESSMENT — ACTIVITIES OF DAILY LIVING (ADL): ADLS_ACUITY_SCORE: 35

## 2024-09-15 NOTE — ED TRIAGE NOTES
Pt presents with c/o having increased left knee pain   Pt states had a log fall on leg 2 weeks ago and reports is still painful   Pt wanted to make sure nothing is wrong due to upcoming trip   No otc meds taken today

## 2024-09-15 NOTE — DISCHARGE INSTRUCTIONS
The x-ray looks good.  You have a hematoma (blood clot) in the skin from your injury.  It takes a while for this to completely resolve.  Recommend applying warm packs for 15 to 20 minutes at a time.  Tylenol or ibuprofen as needed for pain.    Follow-up with your doctor for reevaluation as needed.  Return to urgent care emergency room for any worsening or concerning symptoms.

## 2024-09-15 NOTE — ED PROVIDER NOTES
"  History     Chief Complaint   Patient presents with    Knee Pain     HPI  Rhett Avalos is a 38 year old male who presents to urgent care for evaluation of medial left knee pain.  2 weeks ago patient states that a tree had fallen in the middle of his driveway.  He cut it up into smaller pieces to make it easier for him to remove from his driveway.  He was hugging a piece of log while walking when he tripped and the end of the log hit the medial aspect of his left knee.  He reports that he had bruising to the medial aspect of his knee which has now resolved.  He continues to have swelling to this area along with some pain and sensitivity.  Ambulating okay.  He did ice his leg when the injury first occurred.  Denies history of surgeries or fractures to this knee.    Allergies:  No Known Allergies    Problem List:    Patient Active Problem List    Diagnosis Date Noted    HARITHA (obstructive sleep apnea) 09/27/2022     Priority: Medium     SLEEP STUDY INTERPRETATION  SPLIT NIGHT STUDY        Patient: RHETT AVALOS  YOB: 1985  Study Date: 9/21/2022  MRN: [id number]  Referring Provider: Noelle Farrell MD  Ordering Provider: Eulogio Perera MD     Indications for Polysomnography: The patient is a 36 year old Male who is 5' 9\" and weighs 380.0 lbs. His BMI is 56.3, [place] sleepiness scale 1 and neck circumference is - cm. Relevant medical history includes HTN, severe obesity. A diagnostic polysomnogram was performed to evaluate for sleep apnea, hypoventilation, hypoxemia. After 124.0 minutes of sleep time the patient exhibited sufficient respiratory events qualifying him for a CPAP trial which was then initiated.     Polysomnogram Data: A full night polysomnogram recorded the standard physiologic parameters including EEG, EOG, EMG, ECG, nasal and oral airflow. Respiratory parameters of chest and abdominal movements were recorded with respiratory inductance plethysmography. Oxygen saturation was " recorded by pulse oximetry.  Hypopnea scoring rule used: 1B 4%     Diagnostic PSG  Sleep Architecture: Delayed sleep latency, no clear REM observed.  Highly elevated arousal index.  The total recording time of the polysomnogram was 245.0 minutes. The total sleep time was 124.0 minutes. Sleep latency was increased at 106.0 minutes without the use of a sleep aid. REM latency was - minutes. Arousal index was increased at 100.6 arousals per hour. Sleep efficiency was decreased at 50.6%. Wake after sleep onset was 15.0 minutes. The patient spent 17.3% of total sleep time in Stage N1, 64.1% in Stage N2, 18.5% in Stage N3, and 0.0% in REM. Time in REM supine was - minutes.     Respiration: Severe HARITHA (AHI 86.6) with significant sleep-associated hypoxemia, severe desaturations (thom SpO2 68.3%) and sustained hypoxemia in mid-70 s.  End-tidal capnography was not suggestive of acute hypoventilation.  Pre-study VBG was WNL (pH 7.38, pCO2 41, HCO3 24).  Potential that severity under-reported given no REM and no supine sleep observed.  Events ? The polysomnogram revealed a presence of 176 obstructive, - central, and 1 mixed apneas resulting in an apnea index of 85.6 events per hour. There were - obstructive hypopneas and - central hypopneas resulting in an obstructive hypopnea index of - and central hypopnea index of - events per hour. The combined apnea/hypopnea index was 86.6 events per hour (central apnea/hypopnea index was - events per hour).  The REM AHI was - events per hour. The supine AHI was - events per hour. The RERA index was - events per hour. The RDI was 86.6 events per hour.  Snoring - was reported as mild to moderate.  Respiratory rate and pattern - was notable for normal respiratory rate and pattern.  Sustained Sleep Associated Hypoventilation - End-tidal carbon dioxide monitoring was used, however significant hypoventilation was not present < 1 minutes at or greater than 55 mmHg.  Sleep Associated Hypoxemia -  (Greater than 5 minutes O2 sat at or below 88%) was present. Baseline oxygen saturation was 95.7%. Lowest oxygen saturation was 68.3%. Time spent less than or equal to 88% was 14.7 minutes. Time spent less than or equal to 89% was 16.3 minutes.      Treatment PSG  Sleep Architecture: Improved arousal index, improved sleep efficiency, supine REM was observed.  At 01:51:32 AM the patient was placed on PAP treatment and was titrated at pressures ranging from CPAP 5 cmH2O up to CPAP 13 cmH2O. The total recording time of the treatment portion of the study was 240.1 minutes. The total sleep time was 209.5 minutes. During the treatment portion of the study the sleep latency was 14.0 minutes. REM latency was 111.5 minutes. Arousal index was improved at 37.2 arousals per hour. Sleep efficiency was normal at 87.3%. Wake after sleep onset was 17.0 minutes. The patient spent 15.0% of total sleep time in Stage N1, 46.8% in Stage N2, 23.4% in Stage N3, and 14.8% in REM. Time in REM supine was 31.0 minutes.      Respiration: CPAP at 12-13 cm H2O appeared effective in supine NREM, but with residual obstructive events in supine REM on 10-11 cm H2O.  Efficacy potential affected by ongoing high mask leak, presumed related to facial hair and patient did not tolerate nasal mask.  The final pressure was CPAP 13 cmH2O with an AHI of 7.0 events per hour. Time in REM supine on final pressure was - minutes.   This titration was considered incomplete (does not meet any of the above criteria).     Movement Activity: Frequent PLM s with arousals during diagnostic portion, though difficult to assess given frequent obstructive events at this time.  Periodic Limb Movements  During the diagnostic portion of the study, there were 85 PLMs recorded. The PLM index was 41.1 movements per hour. The PLM Arousal Index was 39.2 per hour.  During the treatment portion of the study, there were 8 PLMs recorded. The PLM index was 2.3 movements per hour. The PLM  Arousal Index was 2.0 per hour.  REM EMG Activity - Excessive transient/sustained muscle activity was not present.  Nocturnal Behavior - Abnormal sleep related behaviors were not noted during/arising out of NREM / REM sleep.   Bruxism - None apparent.     Cardiac Summary: Appears NSR  During the diagnostic portion of the study, the average pulse rate was 73.6 bpm. The minimum pulse rate was 58.0 bpm while the maximum pulse rate was 114.0 bpm.     During the treatment portion of the study, the average pulse rate was 62.9 bpm. The minimum pulse rate was 50.0 bpm while the maximum pulse rate was 93.0 bpm.      Assessment:   During diagnostic portion, delayed sleep latency, no clear REM observed.  Highly elevated arousal index.  Severe HARITHA (AHI 86.6) with significant sleep-associated hypoxemia, severe desaturations (thom SpO2 68.3%) and sustained hypoxemia in mid-70 s.  End-tidal capnography was not suggestive of acute hypoventilation.  Pre-study VBG was WNL (pH 7.38, pCO2 41, HCO3 24).  Potential that severity under-reported given no REM and no supine sleep observed.  CPAP at 12-13 cm H2O appeared effective in supine NREM, but with residual obstructive events in supine REM on 10-11 cm H2O.  Efficacy potential affected by ongoing high mask leak, presumed related to facial hair and patient did not tolerate nasal mask.     Recommendations:  Consider all-night PAP titration PSG.  Treatment of HARITHA with Auto?titrating PAP therapy with a range of 12 cmH2O to 15 cmH2O. Recommend clinical follow up with sleep management team, including review of compliance measures.  Advice regarding the risks of drowsy driving.  Suggest optimizing sleep schedule and avoiding sleep deprivation.  Weight management (if BMI > 30).  Pharmacologic therapy should be used for management of restless legs syndrome only if present and clinically indicated and not based on the presence of periodic limb movements alone.     Diagnostic Codes:   Obstructive  Sleep Apnea G47.33  Sleep Hypoxemia/Hypoventilation G47.36   Periodic Limb Movement Disorder G47.61     _____________________________________   Electronically Signed By: Eulogio Perera MD (9/23/2022)           Pulmonary blastomycosis (H24) 03/18/2020     Priority: Medium    CAP (community acquired pneumonia) 03/05/2020     Priority: Medium    Pneumonia due to drug resistant Streptococcus pneumoniae (H24) 02/19/2020     Priority: Medium    Pneumonia of lower lobe due to infectious organism, unspecified laterality 02/19/2020     Priority: Medium    Essential hypertension 05/20/2019     Priority: Medium    Morbid obesity (H) 04/11/2019     Priority: Medium    Hypothyroidism, unspecified type 04/11/2019     Priority: Medium        Past Medical History:    Past Medical History:   Diagnosis Date    Attention deficit disorder of childhood without me 02/16/2000    Chronic conjunctivitis of both eyes, unspecified chronic conjunctivitis type     Hypertension     Hypertrichosis of left upper eyelid     Hypertrichosis of right upper eyelid     Unspecified acquired hypothyroidism 10/19/2006       Past Surgical History:    Past Surgical History:   Procedure Laterality Date    APPENDECTOMY      TUBES         Family History:    Family History   Problem Relation Age of Onset    Cancer Mother     Cerebrovascular Disease Father        Social History:  Marital Status:  Single [1]  Social History     Tobacco Use    Smoking status: Never     Passive exposure: Never    Smokeless tobacco: Never   Vaping Use    Vaping status: Never Used   Substance Use Topics    Alcohol use: Yes    Drug use: Never        Medications:    levothyroxine (SYNTHROID/LEVOTHROID) 175 MCG tablet  lisinopril (ZESTRIL) 10 MG tablet  tirzepatide-Weight Management (ZEPBOUND) 12.5 MG/0.5ML prefilled pen          Review of Systems   Musculoskeletal:  Positive for joint swelling and myalgias.   All other systems reviewed and are negative.      Physical Exam   BP:  142/81  Pulse: 87  Temp: 98.2  F (36.8  C)  Resp: 18  SpO2: 96 %      Physical Exam  Vitals and nursing note reviewed.   Constitutional:       Appearance: Normal appearance. He is obese. He is not ill-appearing or toxic-appearing.   HENT:      Head: Atraumatic.   Eyes:      Pupils: Pupils are equal, round, and reactive to light.   Cardiovascular:      Rate and Rhythm: Normal rate.   Pulmonary:      Effort: Pulmonary effort is normal. No respiratory distress.   Musculoskeletal:         General: Swelling present. Normal range of motion.      Cervical back: Neck supple.      Comments: Swelling to the medial aspect of left knee.  Area feels mildly fluctuant.  No bruising to this area.  It is tender to the touch.      Full range of motion to the left knee.  Bruising noted to distal left leg.  No erythema.  No obvious deformity.  Distal pulses intact.   Skin:     General: Skin is warm and dry.      Capillary Refill: Capillary refill takes less than 2 seconds.      Findings: Bruising present.   Neurological:      Mental Status: He is alert and oriented to person, place, and time.         ED Course        Procedures         Results for orders placed or performed during the hospital encounter of 09/15/24 (from the past 24 hour(s))   XR Knee Left 3 Views    Narrative    PROCEDURE:  XR KNEE LEFT 3 VIEWS    HISTORY: medial left knee pain and swelling; Carrying a tree log when  he tripped and fell down; end of the tree hit the medial knee 2 weeks  ago; continues to have swelling and pain worse with ambulation; knee  occasionally feels unstable.    COMPARISON:  12/1/2014.    TECHNIQUE:  3 views left knee.    FINDINGS:  No fracture or dislocation is identified. The joint spaces  are preserved. No foreign body is seen.       Impression    IMPRESSION: No acute fracture.      DAKSHA NGUYEN MD         SYSTEM ID:  [id number]       Medications - No data to display    Assessments & Plan (with Medical Decision Making)   This is a  38-year-old male that presented for evaluation of swelling and pain to the medial left knee.  Injury to this knee occurred 2 weeks ago.  On evaluation patient was noted to have a localized area of swelling to the medial aspect of the left knee that felt slightly fluctuant.  No bruising or erythema to this area.  Full range of motion to the knee.  X-ray of was negative for acute findings.  Ultrasound not available at this time as there is no tech available.  Bedside ultrasound was done which showed hypoechoic area consistent with a fluid collection.  There are no findings at this time can concerning for an abscess or infection.  Fluid collection is likely a hematoma.    Discussed findings with patient.  He was advised to apply warm compresses for 15 to 20 minutes at a time and take Tylenol or ibuprofen as needed for pain.  Strict return precautions discussed with patient at length.  He will follow-up with his primary doctor as needed.    I have reviewed the nursing notes.    I have reviewed the findings, diagnosis, plan and need for follow up with the patient.  This document was prepared using a combination of typing and voice generated software.  While every attempt was made for accuracy, spelling and grammatical errors may exist.         Discharge Medication List as of 9/15/2024  4:16 PM          Final diagnoses:   Left knee pain   Hematoma of skin       9/15/2024   [facility]       Mpofu, Elena, CNP  09/15/24 [id number]

## 2024-09-23 ENCOUNTER — MYC MEDICAL ADVICE (OUTPATIENT)
Dept: FAMILY MEDICINE | Facility: OTHER | Age: 39
End: 2024-09-23

## 2024-09-23 DIAGNOSIS — E66.01 MORBID OBESITY (H): Primary | ICD-10-CM

## 2024-09-24 NOTE — TELEPHONE ENCOUNTER
Patient requesting increase in Zepbound. Patient currently on 12.5 MG/ 0.5 ML.  Zepbound 15 MG/0.5 ML pended.

## 2025-01-05 DIAGNOSIS — E03.9 HYPOTHYROIDISM, UNSPECIFIED TYPE: ICD-10-CM

## 2025-01-05 DIAGNOSIS — I10 ESSENTIAL HYPERTENSION: ICD-10-CM

## 2025-01-06 DIAGNOSIS — E66.01 MORBID OBESITY (H): ICD-10-CM

## 2025-01-06 RX ORDER — LEVOTHYROXINE SODIUM 175 UG/1
175 TABLET ORAL DAILY
Qty: 90 TABLET | Refills: 0 | Status: SHIPPED | OUTPATIENT
Start: 2025-01-06

## 2025-01-06 RX ORDER — LISINOPRIL 10 MG/1
10 TABLET ORAL DAILY
Qty: 90 TABLET | Refills: 1 | Status: SHIPPED | OUTPATIENT
Start: 2025-01-06

## 2025-01-06 NOTE — TELEPHONE ENCOUNTER
Lisinopril 10 MG      Last Written Prescription Date:  07/03/24  Last Fill Quantity: 90,   # refills: 1  Last Office Visit: 08/22/24  Future Office visit:       Routing refill request to provider for review/approval because:  ACE Inhibitors (Including Combos) Protocol Failed    Rerun Protocol (1/5/2025 7:38 AM)    Most recent blood pressure under 140/90 in past 12 months- Clinicial or Patient Reported        BP Readings from Last 3 Encounters:   09/15/24 142/81   08/22/24 128/84   05/17/24 132/80

## 2025-01-07 RX ORDER — TIRZEPATIDE 15 MG/.5ML
INJECTION, SOLUTION SUBCUTANEOUS
Qty: 2 ML | Refills: 3 | Status: SHIPPED | OUTPATIENT
Start: 2025-01-07

## 2025-01-07 NOTE — TELEPHONE ENCOUNTER
Tirzepatide-Weight Management (Zepbound) 15 MG/0.5ML prefilled pen    Last Written Prescription Date:  09/24/2024  Last Fill Quantity: 2 mL,   # refills: 3  Last Office Visit: 08/22/2024  Future Office visit:       Routing refill request to provider for review/approval because:

## 2025-02-20 NOTE — PROGRESS NOTES
Assessment & Plan     Essential hypertension  At goal.  Continue Lisinopril 10 mg.  BMP pending.  - Comprehensive metabolic panel (BMP + Alb, Alk Phos, ALT, AST, Total. Bili, TP); Future  - Comprehensive metabolic panel (BMP + Alb, Alk Phos, ALT, AST, Total. Bili, TP)  - lisinopril (ZESTRIL) 10 MG tablet; Take 1 tablet (10 mg) by mouth daily.    Hyperlipidemia, unspecified hyperlipidemia type  Fasting labs pending.  - Comprehensive metabolic panel (BMP + Alb, Alk Phos, ALT, AST, Total. Bili, TP); Future  - Lipid Profile (Chol, Trig, HDL, LDL calc); Future  - Comprehensive metabolic panel (BMP + Alb, Alk Phos, ALT, AST, Total. Bili, TP)  - Lipid Profile (Chol, Trig, HDL, LDL calc)    Hypothyroidism, unspecified type  Updating TSH - with weight loss may need to adjust synthroid - will do once labs reviewed.  - TSH with free T4 reflex; Future  - TSH with free T4 reflex    Morbid obesity (H)  BMI from 55 to 42.  Making great progress.  - tirzepatide-Weight Management (ZEPBOUND) 15 MG/0.5ML prefilled pen; Inject 0.5 mLs (15 mg) subcutaneously every 7 days.    Encounter for weight management  Continue with Zepbound at max.  Tolerating well.  Continue with healthy eating.  Suggested adding protein shake.  Continue 30 min cardio per day.  Add strength training 2 days per week.  Counseled about muscle mass loss with GLP1.  Follow up 6 months for physical.  - TSH with free T4 reflex; Future  - Comprehensive metabolic panel (BMP + Alb, Alk Phos, ALT, AST, Total. Bili, TP); Future  - Lipid Profile (Chol, Trig, HDL, LDL calc); Future  - Hemoglobin A1c; Future  - TSH with free T4 reflex  - Comprehensive metabolic panel (BMP + Alb, Alk Phos, ALT, AST, Total. Bili, TP)  - Lipid Profile (Chol, Trig, HDL, LDL calc)  - Hemoglobin A1c    HARITHA (obstructive sleep apnea)  CPAP.    Skin cyst  Overlying xiphoid. May have noticed with weight loss and not be new.  Asymptomatic.  Monitor and follow up if enlarging or becomes  "symptomatic.      BMI  Estimated body mass index is 42.56 kg/m  as calculated from the following:    Height as of this encounter: 1.778 m (5' 10\").    Weight as of this encounter: 134.5 kg (296 lb 9.6 oz).   Weight management plan: Discussed healthy diet and exercise guidelines      See Patient Instructions    Return in about 6 months (around 8/26/2025) for annual physical.      The longitudinal plan of care for the diagnosis(es)/condition(s) as documented were addressed during this visit. Due to the added complexity in care, I will continue to support Rhett in the subsequent management and with ongoing continuity of care.  Subjective   Rhett is a 39 year old, presenting for the following health issues:  Hypertension and Thyroid Problem        8/22/2024     9:47 AM   Additional Questions   Roomed by Topher Aguilar   Accompanied by None     History of Present Illness       Hypertension: He presents for follow up of hypertension.  He does not check blood pressure  regularly outside of the clinic. Outpatient blood pressures have not been over 140/90. He does not follow a low salt diet.     Hypothyroidism:     Since last visit, patient describes the following symptoms::  None    He eats 0-1 servings of fruits and vegetables daily.He consumes 1 sweetened beverage(s) daily.He exercises with enough effort to increase his heart rate 9 or less minutes per day.  He exercises with enough effort to increase his heart rate 4 days per week.   He is taking medications regularly.     Labs first - went to lab     HARITHA - compliant    Hypertension Follow-up  Do you check your blood pressure regularly outside of the clinic? No   Are you following a low salt diet? No  Are your blood pressures ever more than 140 on the top number (systolic) OR more   than 90 on the bottom number (diastolic), for example 140/90? N/A    Hypothyroidism Follow-up  Since last visit, patient describes the following symptoms: Weight stable, no hair loss, no " "skin changes, no constipation, no loose stools    Weight management - Zepbound  Zebound - 15mg  Starting date - 384 pounds - 390 at house  Starting weight - May 17th 2024  Current weight 296.6 - 291 at home  Current dose- 15mg  Side effects - none; mild constipation; now every other day BM  Exercise - 1/2 hour daily; walking dog; just set up weight in basement to start  Diet -has not changed a lot, just eating less; 1 meal per day - variable; no snacking  Goal weight - not specifically - possibly 250  More energy, feels great  Covered - $20 per month      Review of Systems  Constitutional, HEENT, cardiovascular, pulmonary, gi and gu systems are negative, except as otherwise noted.      Objective    /76 (BP Location: Left arm, Patient Position: Sitting, Cuff Size: Adult Large)   Pulse 77   Temp 97.2  F (36.2  C) (Tympanic)   Ht 1.778 m (5' 10\")   Wt 134.5 kg (296 lb 9.6 oz)   SpO2 98%   BMI 42.56 kg/m    Body mass index is 42.56 kg/m .  Physical Exam   GENERAL: alert, no distress, and obese  EYES: Eyes grossly normal to inspection, PERRL and conjunctivae and sclerae normal  NECK: no adenopathy, no asymmetry, masses, or scars  RESP: lungs clear to auscultation - no rales, rhonchi or wheezes  CV: regular rate and rhythm, normal S1 S2, no S3 or S4, no murmur, click or rub, no peripheral edema  ABDOMEN: soft, nontender, no hepatosplenomegaly, no masses and bowel sounds normal  MS: no gross musculoskeletal defects noted, no edema  SKIN: over xiphoid - small mobile, rubbery, 1 cm, subcutaneous cyst  PSYCH: mentation appears normal, affect normal/bright    No results found for this or any previous visit (from the past 24 hours).    Pending.    Signed Electronically by: Noelle Shin MD    "

## 2025-02-26 ENCOUNTER — OFFICE VISIT (OUTPATIENT)
Dept: FAMILY MEDICINE | Facility: OTHER | Age: 40
End: 2025-02-26
Attending: FAMILY MEDICINE
Payer: COMMERCIAL

## 2025-02-26 VITALS
DIASTOLIC BLOOD PRESSURE: 76 MMHG | TEMPERATURE: 97.2 F | WEIGHT: 296.6 LBS | SYSTOLIC BLOOD PRESSURE: 130 MMHG | BODY MASS INDEX: 42.46 KG/M2 | HEIGHT: 70 IN | HEART RATE: 77 BPM | OXYGEN SATURATION: 98 %

## 2025-02-26 DIAGNOSIS — E03.9 HYPOTHYROIDISM, UNSPECIFIED TYPE: ICD-10-CM

## 2025-02-26 DIAGNOSIS — E78.5 HYPERLIPIDEMIA, UNSPECIFIED HYPERLIPIDEMIA TYPE: ICD-10-CM

## 2025-02-26 DIAGNOSIS — Z76.89 ENCOUNTER FOR WEIGHT MANAGEMENT: ICD-10-CM

## 2025-02-26 DIAGNOSIS — E66.01 MORBID OBESITY (H): ICD-10-CM

## 2025-02-26 DIAGNOSIS — G47.33 OSA (OBSTRUCTIVE SLEEP APNEA): ICD-10-CM

## 2025-02-26 DIAGNOSIS — L72.9 SKIN CYST: ICD-10-CM

## 2025-02-26 DIAGNOSIS — I10 ESSENTIAL HYPERTENSION: Primary | ICD-10-CM

## 2025-02-26 LAB
ALBUMIN SERPL BCG-MCNC: 4.2 G/DL (ref 3.5–5.2)
ALP SERPL-CCNC: 69 U/L (ref 40–150)
ALT SERPL W P-5'-P-CCNC: 40 U/L (ref 0–70)
ANION GAP SERPL CALCULATED.3IONS-SCNC: 12 MMOL/L (ref 7–15)
AST SERPL W P-5'-P-CCNC: 22 U/L (ref 0–45)
BILIRUB SERPL-MCNC: 0.9 MG/DL
BUN SERPL-MCNC: 11.7 MG/DL (ref 6–20)
CALCIUM SERPL-MCNC: 9 MG/DL (ref 8.8–10.4)
CHLORIDE SERPL-SCNC: 105 MMOL/L (ref 98–107)
CHOLEST SERPL-MCNC: 160 MG/DL
CREAT SERPL-MCNC: 1.33 MG/DL (ref 0.67–1.17)
EGFRCR SERPLBLD CKD-EPI 2021: 71 ML/MIN/1.73M2
EST. AVERAGE GLUCOSE BLD GHB EST-MCNC: 85 MG/DL
FASTING STATUS PATIENT QL REPORTED: YES
FASTING STATUS PATIENT QL REPORTED: YES
GLUCOSE SERPL-MCNC: 94 MG/DL (ref 70–99)
HBA1C MFR BLD: 4.6 %
HCO3 SERPL-SCNC: 23 MMOL/L (ref 22–29)
HDLC SERPL-MCNC: 40 MG/DL
LDLC SERPL CALC-MCNC: 107 MG/DL
NONHDLC SERPL-MCNC: 120 MG/DL
POTASSIUM SERPL-SCNC: 3.9 MMOL/L (ref 3.4–5.3)
PROT SERPL-MCNC: 7.3 G/DL (ref 6.4–8.3)
SODIUM SERPL-SCNC: 140 MMOL/L (ref 135–145)
T4 FREE SERPL-MCNC: 1.38 NG/DL (ref 0.9–1.7)
TRIGL SERPL-MCNC: 67 MG/DL
TSH SERPL DL<=0.005 MIU/L-ACNC: 5.61 UIU/ML (ref 0.3–4.2)

## 2025-02-26 RX ORDER — LISINOPRIL 10 MG/1
10 TABLET ORAL DAILY
Qty: 90 TABLET | Refills: 3 | Status: SHIPPED | OUTPATIENT
Start: 2025-02-26

## 2025-02-26 RX ORDER — LEVOTHYROXINE SODIUM 175 UG/1
175 TABLET ORAL DAILY
Qty: 90 TABLET | Refills: 3 | Status: CANCELLED | OUTPATIENT
Start: 2025-02-26

## 2025-02-26 RX ORDER — LEVOTHYROXINE SODIUM 175 UG/1
175 TABLET ORAL DAILY
Qty: 90 TABLET | Refills: 1 | Status: SHIPPED | OUTPATIENT
Start: 2025-02-26

## 2025-02-26 ASSESSMENT — PAIN SCALES - GENERAL: PAINLEVEL_OUTOF10: NO PAIN (0)

## 2025-04-02 ENCOUNTER — TELEPHONE (OUTPATIENT)
Dept: PHARMACY | Facility: PHYSICIAN GROUP | Age: 40
End: 2025-04-02
Payer: COMMERCIAL

## 2025-04-02 NOTE — TELEPHONE ENCOUNTER
We have been unable to reach this patient for MTM follow-up after several attempts. We will stop reaching out to the patient at this time. Please let us know if we can assist in this patient's care in the future.    Routing to PCP as RAMSEY Marquez, PharmD  Medication Therapy Management Pharmacist

## 2025-04-14 DIAGNOSIS — G47.33 OSA (OBSTRUCTIVE SLEEP APNEA): Primary | ICD-10-CM

## 2025-04-28 ENCOUNTER — MYC REFILL (OUTPATIENT)
Dept: FAMILY MEDICINE | Facility: OTHER | Age: 40
End: 2025-04-28

## 2025-04-28 DIAGNOSIS — E66.01 MORBID OBESITY (H): ICD-10-CM

## 2025-04-29 NOTE — TELEPHONE ENCOUNTER
tirzepatide-Weight Management (ZEPBOUND) 15 MG/0.5ML prefilled pen            Last Written Prescription Date:  2/26/25  Last Fill Quantity: 6 mL,   # refills: 1  Last Office Visit: 2/26/25  Future Office visit:       Routing refill request to provider for review/approval because:  Drug not on the [facility], [facility] or [facility] refill protocol or controlled substance

## 2025-08-26 SDOH — HEALTH STABILITY: PHYSICAL HEALTH: ON AVERAGE, HOW MANY MINUTES DO YOU ENGAGE IN EXERCISE AT THIS LEVEL?: 20 MIN

## 2025-08-26 SDOH — HEALTH STABILITY: PHYSICAL HEALTH: ON AVERAGE, HOW MANY DAYS PER WEEK DO YOU ENGAGE IN MODERATE TO STRENUOUS EXERCISE (LIKE A BRISK WALK)?: 3 DAYS

## 2025-08-27 ENCOUNTER — OFFICE VISIT (OUTPATIENT)
Dept: FAMILY MEDICINE | Facility: OTHER | Age: 40
End: 2025-08-27
Attending: FAMILY MEDICINE
Payer: COMMERCIAL

## 2025-08-27 VITALS
SYSTOLIC BLOOD PRESSURE: 126 MMHG | OXYGEN SATURATION: 98 % | RESPIRATION RATE: 16 BRPM | HEIGHT: 70 IN | HEART RATE: 76 BPM | BODY MASS INDEX: 39.44 KG/M2 | WEIGHT: 275.5 LBS | DIASTOLIC BLOOD PRESSURE: 68 MMHG | TEMPERATURE: 96.8 F

## 2025-08-27 DIAGNOSIS — Z13.220 LIPID SCREENING: ICD-10-CM

## 2025-08-27 DIAGNOSIS — E03.9 HYPOTHYROIDISM, UNSPECIFIED TYPE: ICD-10-CM

## 2025-08-27 DIAGNOSIS — I10 ESSENTIAL HYPERTENSION: ICD-10-CM

## 2025-08-27 DIAGNOSIS — Z00.00 ROUTINE GENERAL MEDICAL EXAMINATION AT A HEALTH CARE FACILITY: Primary | ICD-10-CM

## 2025-08-27 DIAGNOSIS — E66.01 MORBID OBESITY (H): ICD-10-CM

## 2025-08-27 DIAGNOSIS — G47.33 OSA (OBSTRUCTIVE SLEEP APNEA): ICD-10-CM

## 2025-08-27 DIAGNOSIS — I73.00 RAYNAUD'S PHENOMENON WITHOUT GANGRENE: ICD-10-CM

## 2025-08-27 DIAGNOSIS — R79.89 ELEVATED SERUM CREATININE: ICD-10-CM

## 2025-08-27 LAB
ANION GAP SERPL CALCULATED.3IONS-SCNC: 11 MMOL/L (ref 7–15)
BUN SERPL-MCNC: 18 MG/DL (ref 6–20)
CALCIUM SERPL-MCNC: 9.1 MG/DL (ref 8.8–10.4)
CHLORIDE SERPL-SCNC: 106 MMOL/L (ref 98–107)
CHOLEST SERPL-MCNC: 167 MG/DL
CREAT SERPL-MCNC: 1.29 MG/DL (ref 0.67–1.17)
EGFRCR SERPLBLD CKD-EPI 2021: 73 ML/MIN/1.73M2
FASTING STATUS PATIENT QL REPORTED: ABNORMAL
GLUCOSE SERPL-MCNC: 81 MG/DL (ref 70–99)
HCO3 SERPL-SCNC: 24 MMOL/L (ref 22–29)
HDLC SERPL-MCNC: 45 MG/DL
LDLC SERPL CALC-MCNC: 109 MG/DL
NONHDLC SERPL-MCNC: 122 MG/DL
POTASSIUM SERPL-SCNC: 4.3 MMOL/L (ref 3.4–5.3)
SODIUM SERPL-SCNC: 141 MMOL/L (ref 135–145)
T4 FREE SERPL-MCNC: 1.18 NG/DL (ref 0.9–1.7)
TRIGL SERPL-MCNC: 67 MG/DL
TSH SERPL DL<=0.005 MIU/L-ACNC: 10.28 UIU/ML (ref 0.3–4.2)

## 2025-08-27 RX ORDER — LISINOPRIL 10 MG/1
10 TABLET ORAL DAILY
Qty: 90 TABLET | Refills: 3 | Status: SHIPPED | OUTPATIENT
Start: 2025-08-27

## 2025-08-27 RX ORDER — LEVOTHYROXINE SODIUM 200 UG/1
200 TABLET ORAL DAILY
Qty: 90 TABLET | Refills: 0 | Status: SHIPPED | OUTPATIENT
Start: 2025-08-27

## 2025-08-27 ASSESSMENT — PAIN SCALES - GENERAL: PAINLEVEL_OUTOF10: NO PAIN (0)
